# Patient Record
Sex: FEMALE | Race: WHITE | NOT HISPANIC OR LATINO | Employment: OTHER | URBAN - METROPOLITAN AREA
[De-identification: names, ages, dates, MRNs, and addresses within clinical notes are randomized per-mention and may not be internally consistent; named-entity substitution may affect disease eponyms.]

---

## 2017-02-27 ENCOUNTER — HOSPITAL ENCOUNTER (OUTPATIENT)
Dept: MAMMOGRAPHY | Facility: CLINIC | Age: 57
Discharge: HOME | End: 2017-02-27
Attending: FAMILY MEDICINE | Admitting: FAMILY MEDICINE
Payer: COMMERCIAL

## 2017-02-27 DIAGNOSIS — Z12.31 VISIT FOR SCREENING MAMMOGRAM: ICD-10-CM

## 2017-02-27 PROCEDURE — G0202 SCR MAMMO BI INCL CAD: HCPCS

## 2017-03-11 ENCOUNTER — HOSPITAL ENCOUNTER (EMERGENCY)
Facility: CLINIC | Age: 57
Discharge: HOME | End: 2017-03-11
Attending: EMERGENCY MEDICINE | Admitting: EMERGENCY MEDICINE
Payer: COMMERCIAL

## 2017-03-11 ENCOUNTER — APPOINTMENT (OUTPATIENT)
Dept: GENERAL RADIOLOGY | Facility: CLINIC | Age: 57
End: 2017-03-11
Attending: EMERGENCY MEDICINE
Payer: COMMERCIAL

## 2017-03-11 VITALS
RESPIRATION RATE: 18 BRPM | HEART RATE: 65 BPM | OXYGEN SATURATION: 97 % | HEIGHT: 62 IN | TEMPERATURE: 98.2 F | WEIGHT: 140 LBS | BODY MASS INDEX: 25.76 KG/M2 | DIASTOLIC BLOOD PRESSURE: 78 MMHG | SYSTOLIC BLOOD PRESSURE: 119 MMHG

## 2017-03-11 DIAGNOSIS — S62.101A FRACTURE OF WRIST, RIGHT, CLOSED, INITIAL ENCOUNTER: ICD-10-CM

## 2017-03-11 PROCEDURE — 99284 EMERGENCY DEPT VISIT MOD MDM: CPT | Mod: 25

## 2017-03-11 PROCEDURE — 29125 APPL SHORT ARM SPLINT STATIC: CPT | Performed by: EMERGENCY MEDICINE

## 2017-03-11 PROCEDURE — 99284 EMERGENCY DEPT VISIT MOD MDM: CPT | Mod: 25 | Performed by: EMERGENCY MEDICINE

## 2017-03-11 PROCEDURE — 29125 APPL SHORT ARM SPLINT STATIC: CPT

## 2017-03-11 PROCEDURE — 73110 X-RAY EXAM OF WRIST: CPT | Mod: TC,RT

## 2017-03-11 ASSESSMENT — ENCOUNTER SYMPTOMS: ARTHRALGIAS: 1

## 2017-03-11 NOTE — ED NOTES
Fell in the yard at 1315.cCleaning up branches. Wrist is specially tender and sore around the area R distal radius. NO obvious deformity and mild swelling noted.

## 2017-03-11 NOTE — ED PROVIDER NOTES
History     Chief Complaint   Patient presents with     Hand Injury     The history is provided by the patient.     Mary Carmen Jaime is a 56 year old female who presents to the emergency department with right hand injury. She states that today around 1245 she tripped on tree brush while doing yard work. She placed her right hand down to catch herself from the fall and injured her right wrist. Patient took Motrin prior to arrival.  Pain is dull and moderate.      I have reviewed the Medications, Allergies, Past Medical and Surgical History, and Social History in the Epic system.    Patient Active Problem List   Diagnosis     Rosacea     FAMILY HX CONDITION NEC     CARDIOVASCULAR SCREENING; LDL GOAL LESS THAN 160     Advanced directives, counseling/discussion     Trigger finger of right thumb     Bilateral carpal tunnel syndrome     Left carpal tunnel syndrome     Past Medical History   Diagnosis Date     ASCUS on Pap smear 8/7/2011     10/15/09 pap ASCUS neg HPV. Plan-- repeat screening pap smear in one year (due 10/15/2010) Pap 11/10/10 NIL.  Repeat 3 years.        NO ACTIVE PROBLEMS        Past Surgical History   Procedure Laterality Date     C appendectomy  1996     Colonoscopy  1/10/2011     normal repeat 10 years     Laparoscopic tubal ligation  3/9/2012     Procedure:LAPAROSCOPIC TUBAL LIGATION; Laparoscopic Tubal; Surgeon:RUPAL FREEMAN; Location:[facility] OR     Release carpal tunnel Right 6/24/2016     Procedure: RELEASE CARPAL TUNNEL;  Surgeon: Parmjit Peña DO;  Location: [facility] OR     Release trigger finger Right 6/24/2016     Procedure: RELEASE TRIGGER FINGER;  Surgeon: Parmjit Peña DO;  Location: [facility]     Release carpal tunnel Left 10/21/2016     Procedure: RELEASE CARPAL TUNNEL;  Surgeon: Parmjit Peña DO;  Location: [facility]       Family History   Problem Relation Age of Onset     DIABETES Father      AOD     DIABETES Sister      JOD     CANCER Father      salivary glands      "HEART DISEASE Father      1st MI in 40's     CANCER Paternal Uncle      Prostate     Genitourinary Problems Sister      kidney abnormality, hypertrophy and rupture       Social History   Substance Use Topics     Smoking status: Never Smoker     Smokeless tobacco: Never Used     Alcohol use 0.0 oz/week     0 Standard drinks or equivalent per week      Comment: 0-3 drinks per week        Immunization History   Administered Date(s) Administered     Influenza (IIV3) 11/10/2010, 01/11/2012, 10/07/2013     Influenza Vaccine IM 3yrs+ 4 Valent IIV4 10/31/2016     TD (ADULT, 7+) 02/25/2000     TDAP (ADACEL AGES 11-64) 09/28/2010          Allergies   Allergen Reactions     Cephalosporins Rash     Amoxicillin Rash       Current Outpatient Prescriptions   Medication Sig Dispense Refill     MOTRIN IB PO Take 400 mg by mouth every 4 hours as needed for moderate pain       VITAMIN D, CHOLECALCIFEROL, PO Take 2,000 Units by mouth daily       Flaxseed, Linseed, (FLAX PO) Take 1 capsule by mouth daily        Probiotic Product (ACIDOPHILUS PROBIOTIC BLEND) CAPS 1 OTC tab daily       Black Cohosh (CVS BLACK COHOSH) 540 MG CAPS 1 tablet daily, uncertain strength       B Complex Vitamins (B COMPLEX PO) Take  by mouth. 1 daily       Digestive Enzymes (MULTI-ENZYME PO) Take 1 capsule by mouth daily        Cinnamon 500 MG CAPS Take  by mouth. 1 TABLET ONCE A DAY       Green Tea, Camillia sinensis, (GREEN TEA PO) Take  by mouth. 1 TABLET ONCE A DAY       GELATIN 1 ONCE A DAY       CALCIUM & MAGNESIUM CARBONATES PO Take  by mouth. WITH ZINC:1 TABLET ONCE A DAY       MULTIVITAMIN OR 1 tablet daily       Review of Systems   Musculoskeletal: Positive for arthralgias (right wrist).   All other systems reviewed and are negative.      Physical Exam   BP: 123/72  Pulse: 63  Temp: 97.6  F (36.4  C)  Resp: 16  Height: 157.5 cm (5' 2\")  Weight: 63.5 kg (140 lb)  SpO2: 100 %  Physical Exam   Constitutional: She is oriented to person, place, and time. " She appears well-developed and well-nourished.   HENT:   Head: Normocephalic and atraumatic.   Eyes: Conjunctivae and EOM are normal.   Neck: Normal range of motion. Neck supple.   Cardiovascular: Normal rate, regular rhythm, normal heart sounds and intact distal pulses.    Pulmonary/Chest: Effort normal and breath sounds normal. She exhibits no tenderness.   Abdominal: Soft. Bowel sounds are normal. There is no tenderness.   Musculoskeletal:        Cervical back: She exhibits no tenderness.        Thoracic back: She exhibits no tenderness.        Lumbar back: She exhibits no tenderness.   Swelling at right distal radius dorsally. Moderately tender. No other deformity or swelling. CMS intact.   Neurological: She is alert and oriented to person, place, and time.   Skin: Skin is warm and dry.   Psychiatric: She has a normal mood and affect. Her behavior is normal.   Nursing note and vitals reviewed.      ED Course     ED Course     Orthopedic injury tx  Date/Time: 3/11/2017 3:47 PM  Performed by: JOE MCWILLIAMS  Authorized by: JOE MCWILLIAMS   Injury location: wrist  Location details: right wrist  Injury type: fracture  Fracture type: distal radius and triquetral  Pre-procedure neurovascular assessment: neurovascularly intact  Pre-procedure distal perfusion: normal  Pre-procedure neurological function: normal  Pre-procedure range of motion: reduced  Immobilization: splint  Supplies used: Ortho-Glass  Post-procedure neurovascular assessment: post-procedure neurovascularly intact  Post-procedure distal perfusion: normal  Post-procedure neurological function: normal  Post-procedure range of motion: unchanged  Patient tolerance: Patient tolerated the procedure well with no immediate complications                       Results for orders placed or performed during the hospital encounter of 03/11/17 (from the past 24 hour(s))   XR Wrist Right G/E 3 Views    Narrative    RIGHT WRIST THREE VIEWS   3/11/2017 2:06  PM    HISTORY: Pain after trauma.    COMPARISON: None.      Impression    IMPRESSION: There is suggestion of slight cortical irregularity of the  radial styloid which may represent a nondisplaced intra-articular  fracture. The lateral view also demonstrates mild fragmentation of the  dorsal aspect of the triquetrum, also representing a nondisplaced  fracture. There is a moderate ulnar minus variance. No other  abnormality is seen.     ITZ LOPEZ MD     Medications - No data to display    Assessments & Plan (with Medical Decision Making)  56-year-old female with fall and what appears to be a distal right radial fracture as described above.  I personally reviewed the images as well.  She is tender over the area of abnormality seen in the distal radius and in the area over the triquetrum.  She is placed in a short arm splint.  An appointment is established with sports medicine later this week for follow-up.       I have reviewed the nursing notes.    I have reviewed the findings, diagnosis, plan and need for follow up with the patient.    Discharge Medication List as of 3/11/2017  2:43 PM          Final diagnoses:   Fracture of wrist, right, closed, initial encounter   This document serves as a record of services personally performed by Franko Moser MD. It was created on their behalf by Aby Painter, a trained medical scribe. The creation of this record is based on the provider's personal observations and the statements of the patient. This document has been checked and approved by the attending provider.     Note: Chart documentation done in part with Dragon Voice Recognition software. Although reviewed after completion, some word and grammatical errors may remain.    3/11/2017   [facility] EMERGENCY DEPARTMENT     Franko Moser MD  03/11/17 1547

## 2017-03-11 NOTE — LETTER
[facility] EMERGENCY DEPARTMENT  [address] [place] [address]  [phone]    2017    Mary Carmen Jaime  [address] [facility]  [place] [id number] [phone] (home)     : 1960      To Whom it may concern:    Mary Carmen Jaime was seen in our Emergency Department today, 2017.  She will not be able to use her right arm for the next 4 days at least.      Sincerely,        Franko Moser MD

## 2017-03-11 NOTE — ED NOTES
Was cleaning up in yard. Tripped on brush.Held right hand out to catch self. Pain in right wrist. Took motrin 400mg PTA. Ice on now. Strong right radial pulse. Swollen area inner aspect right wrist

## 2017-03-11 NOTE — DISCHARGE INSTRUCTIONS
Wrist Fracture, General  You have a broken bone (fracture) in your wrist. This may be a small crack or chip in the bone. Or it may be a major break, with the broken parts pushed out of position. Wrist fractures are treated with a splint or cast. They take about 4 to 6 weeks to heal. Severe injuries may need surgery.    Home care  Follow these guidelines when caring for yourself at home:    Keep your arm elevated to reduce pain and swelling. When sitting or lying down keep your arm above the level of your heart. You can do this by placing your arm on a pillow that rests on your chest or on a pillow at your side. This is most important during the first 2 days (48 hours) after the injury.    Put an ice pack on the injured area. Do this for 20 minutes every 1 to 2 hours the first day for pain relief. You can make an ice pack by wrapping a plastic bag of ice cubes in a thin towel. As the ice melts, be careful that the cast or splint doesn t get wet. Continue using the ice pack 3 to 4 times a day for the next 2 days. Then use the ice pack as needed to ease pain and swelling.    Keep the cast or splint completely dry at all times. Bathe with your cast or splint out of the water. Protect it with a large plastic bag, rubber-banded at the top end. If a fiberglass cast or splint gets wet, you can dry it with a hair dryer.    You may use acetaminophen or ibuprofen to control pain, unless another pain medicine was prescribed. If you have chronic liver or kidney disease, talk with your health care provider before using these medicines. Also talk with your provider if you ve had a stomach ulcer or GI bleeding.    Don t put creams or objects under the cast if you have itching.  Follow-up care  Follow up with your health care provider in 1 week, or as advised. This is to make sure the bone is healing the way it should. If a splint was put on, it will be changed to a cast during your follow-up visit. A cast may need to be changed  at 2 to 3 weeks, as the swelling goes down.  If X-rays were taken, a radiologist will look at them. You will be told of any new findings that may affect your care.  When to seek medical advice  Call your health care provider right away if any of these occur:    The plaster cast or splint becomes wet or soft    The cast cracks    Bad odor from the cast or wound fluid stains the cast    The fiberglass cast or splint stays wet for more than 24 hours    Tightness or pain under the cast or splint gets worse    Fingers become swollen, cold, blue, numb, or tingly    You can t move your fingers    Skin around cast becomes red    6658-1453 [employer]. [address] [place] [address], [place] [address]. All rights reserved. This information is not intended as a substitute for professional medical care. Always follow your healthcare professional's instructions.

## 2017-03-11 NOTE — ED AVS SNAPSHOT
[facility] Emergency Department    [address] [place] [address]    Phone:  [phone]    Fax:  [phone]                                       Mary Carmen Jaime   MRN: [id number]    Department:  [facility] Emergency Department   Date of Visit:  3/11/2017           Patient Information     Date Of Birth          1960        Your diagnoses for this visit were:     Fracture of wrist, right, closed, initial encounter        You were seen by Franko Moser MD.      Follow-up Information     Follow up with Radha Esparza MD.    Specialty:  Family Medicine - Sports Medicine    Contact information:    [facility]  [address]  [facility] [address]  [phone]          Discharge Instructions         Wrist Fracture, General  You have a broken bone (fracture) in your wrist. This may be a small crack or chip in the bone. Or it may be a major break, with the broken parts pushed out of position. Wrist fractures are treated with a splint or cast. They take about 4 to 6 weeks to heal. Severe injuries may need surgery.    Home care  Follow these guidelines when caring for yourself at home:    Keep your arm elevated to reduce pain and swelling. When sitting or lying down keep your arm above the level of your heart. You can do this by placing your arm on a pillow that rests on your chest or on a pillow at your side. This is most important during the first 2 days (48 hours) after the injury.    Put an ice pack on the injured area. Do this for 20 minutes every 1 to 2 hours the first day for pain relief. You can make an ice pack by wrapping a plastic bag of ice cubes in a thin towel. As the ice melts, be careful that the cast or splint doesn t get wet. Continue using the ice pack 3 to 4 times a day for the next 2 days. Then use the ice pack as needed to ease pain and swelling.    Keep the cast or splint completely dry at all times. Bathe with your cast or  splint out of the water. Protect it with a large plastic bag, rubber-banded at the top end. If a fiberglass cast or splint gets wet, you can dry it with a hair dryer.    You may use acetaminophen or ibuprofen to control pain, unless another pain medicine was prescribed. If you have chronic liver or kidney disease, talk with your health care provider before using these medicines. Also talk with your provider if you ve had a stomach ulcer or GI bleeding.    Don t put creams or objects under the cast if you have itching.  Follow-up care  Follow up with your health care provider in 1 week, or as advised. This is to make sure the bone is healing the way it should. If a splint was put on, it will be changed to a cast during your follow-up visit. A cast may need to be changed at 2 to 3 weeks, as the swelling goes down.  If X-rays were taken, a radiologist will look at them. You will be told of any new findings that may affect your care.  When to seek medical advice  Call your health care provider right away if any of these occur:    The plaster cast or splint becomes wet or soft    The cast cracks    Bad odor from the cast or wound fluid stains the cast    The fiberglass cast or splint stays wet for more than 24 hours    Tightness or pain under the cast or splint gets worse    Fingers become swollen, cold, blue, numb, or tingly    You can t move your fingers    Skin around cast becomes red    6303-9271 [employer]. [address] [place] [address]. All rights reserved. This information is not intended as a substitute for professional medical care. Always follow your healthcare professional's instructions.          Future Appointments        Provider Department Dept Phone Center    3/14/2017 3:40 PM Radha Esparza MD [facility] [phone] [facility]    4/5/2017 2:00 PM Huong Greenwood MD [facility] [phone] [facility]      24 Hour  Appointment Hotline       To make an appointment at any [facility] clinic, call [phone] ([phone]). If you don't have a family doctor or clinic, we will help you find one. [facility] clinics are conveniently located to serve the needs of you and your family.             Review of your medicines      Our records show that you are taking the medicines listed below. If these are incorrect, please call your family doctor or clinic.        Dose / Directions Last dose taken    ACIDOPHILUS PROBIOTIC BLEND Caps        1 OTC tab daily   Refills:  0        B COMPLEX PO        Take  by mouth. 1 daily   Refills:  0        Black Cohosh 540 MG Caps   Commonly known as:  [employer]S BLACK COHOSH        1 tablet daily, uncertain strength   Refills:  0        CALCIUM & MAGNESIUM CARBONATES PO        Take  by mouth. WITH ZINC:1 TABLET ONCE A DAY   Refills:  0        cinnamon 500 MG Caps        Take  by mouth. 1 TABLET ONCE A DAY   Refills:  0        FLAX PO   Dose:  1 capsule        Take 1 capsule by mouth daily   Refills:  0        GELATIN        1 ONCE A DAY   Refills:  0        GREEN TEA PO        Take  by mouth. 1 TABLET ONCE A DAY   Refills:  0        MOTRIN IB PO   Dose:  400 mg        Take 400 mg by mouth every 4 hours as needed for moderate pain   Refills:  0        MULTI-ENZYME PO   Dose:  1 capsule        Take 1 capsule by mouth daily   Refills:  0        MULTIVITAMIN PO        1 tablet daily   Refills:  0        VITAMIN D (CHOLECALCIFEROL) PO   Dose:  2000 Units        Take 2,000 Units by mouth daily   Refills:  0                Procedures and tests performed during your visit     XR Wrist Right G/E 3 Views      Orders Needing Specimen Collection     None      Pending Results     No orders found from 3/9/2017 to 3/12/2017.            Pending Culture Results     No orders found from 3/9/2017 to 3/12/2017.            Thank you for choosing [facility]       Thank you for choosing [facility] for your care. Our goal is always to  "provide you with excellent care. Hearing back from our patients is one way we can continue to improve our services. Please take a few minutes to complete the written survey that you may receive in the mail after you visit with us. Thank you!        PictureMenu Information     PictureMenu lets you send messages to your doctor, view your test results, renew your prescriptions, schedule appointments and more. To sign up, go to [email or web address] . Click on \"Log in\" on the left side of the screen, which will take you to the Welcome page. Then click on \"Sign up Now\" on the right side of the page.     You will be asked to enter the access code listed below, as well as some personal information. Please follow the directions to create your username and password.     Your access code is: [email or web address]  Expires: 2017  2:43 PM     Your access code will  in 90 days. If you need help or a new code, please call your [facility] clinic or [phone].        Care EveryWhere ID     This is your Care EveryWhere ID. This could be used by other organizations to access your [facility] medical records  [id number]        After Visit Summary       This is your record. Keep this with you and show to your community pharmacist(s) and doctor(s) at your next visit.                  "

## 2017-03-14 ENCOUNTER — OFFICE VISIT (OUTPATIENT)
Dept: ORTHOPEDICS | Facility: CLINIC | Age: 57
End: 2017-03-14
Payer: COMMERCIAL

## 2017-03-14 VITALS — BODY MASS INDEX: 27.79 KG/M2 | WEIGHT: 151 LBS | HEART RATE: 84 BPM | HEIGHT: 62 IN

## 2017-03-14 DIAGNOSIS — S52.514A CLOSED NONDISPLACED FRACTURE OF STYLOID PROCESS OF RIGHT RADIUS, INITIAL ENCOUNTER: Primary | ICD-10-CM

## 2017-03-14 PROCEDURE — 99204 OFFICE O/P NEW MOD 45 MIN: CPT | Performed by: PHYSICAL MEDICINE & REHABILITATION

## 2017-03-14 NOTE — PATIENT INSTRUCTIONS
Today's Plan of Care:  -EXOS care provided  -Elevate hand above heart for swellling  -Over the counter medication: Patient's preferred OTC medication as directed on packaging.    Follow Up: 1 week or sooner if symptoms fail to improve or worsen. Call with any questions or concerns.

## 2017-03-14 NOTE — MR AVS SNAPSHOT
"              After Visit Summary   3/14/2017    Mary Carmen Jaime    MRN: [id number]           Patient Information     Date Of Birth          1960        Visit Information        Provider Department      3/14/2017 3:40 PM Radha Esparza MD [facility]        Care Instructions    Today's Plan of Care:  -EXOS care provided  -Elevate hand above heart for swellling  -Over the counter medication: Patient's preferred OTC medication as directed on packaging.    Follow Up: 1 week or sooner if symptoms fail to improve or worsen. Call with any questions or concerns.             Follow-ups after your visit        Your next 10 appointments already scheduled     Apr 05, 2017  2:00 PM CDT   PHYSICAL with Huong Greenwood MD   [facility] ([facility])    [address] [place] [address] [phone]              Who to contact     If you have questions or need follow up information about today's clinic visit or your schedule please contact [facility] directly at [phone].  Normal or non-critical lab and imaging results will be communicated to you by [employer], letter or phone within 4 business days after the clinic has received the results. If you do not hear from us within 7 days, please contact the clinic through MiF[employer]art or phone. If you have a critical or abnormal lab result, we will notify you by phone as soon as possible.  Submit refill requests through NullPointer or call your pharmacy and they will forward the refill request to us. Please allow 3 business days for your refill to be completed.          Additional Information About Your Visit        MiFihart Information     NullPointer lets you send messages to your doctor, view your test results, renew your prescriptions, schedule appointments and more. To sign up, go to [email or web address] . Click on \"Log in\" on the left side of the screen, which will take you to the Welcome " "page. Then click on \"Sign up Now\" on the right side of the page.     You will be asked to enter the access code listed below, as well as some personal information. Please follow the directions to create your username and password.     Your access code is: [email or web address]  Expires: 2017  3:43 PM     Your access code will  in 90 days. If you need help or a new code, please call your [facility] or [phone].        Care EveryWhere ID     This is your Care EveryWhere ID. This could be used by other organizations to access your [facility] medical records  [id number]        Your Vitals Were     Pulse Height Last Period BMI (Body Mass Index)          84 5' 2\" (1.575 m) 2013 27.62 kg/m2         Blood Pressure from Last 3 Encounters:   17 119/78   10/21/16 105/65   16 112/78    Weight from Last 3 Encounters:   17 151 lb (68.5 kg)   17 140 lb (63.5 kg)   10/31/16 146 lb (66.2 kg)              Today, you had the following     No orders found for display       Primary Care Provider Office Phone # Fax #    Huong Aleah Greenwood [phone]       [facility] [address] [place] [address]        Thank you!     Thank you for choosing [facility]  for your care. Our goal is always to provide you with excellent care. Hearing back from our patients is one way we can continue to improve our services. Please take a few minutes to complete the written survey that you may receive in the mail after your visit with us. Thank you!             Your Updated Medication List - Protect others around you: Learn how to safely use, store and throw away your medicines at www.disposemymeds.org.          This list is accurate as of: 3/14/17  4:12 PM.  Always use your most recent med list.                   Brand Name Dispense Instructions for use    ACIDOPHILUS PROBIOTIC BLEND Caps      1 OTC tab daily       B COMPLEX PO      Take  by mouth. 1 daily       " Black Cohosh 540 MG Caps    CVS BLACK COHOSH     1 tablet daily, uncertain strength       CALCIUM & MAGNESIUM CARBONATES PO      Take  by mouth. WITH ZINC:1 TABLET ONCE A DAY       cinnamon 500 MG Caps      Take  by mouth. 1 TABLET ONCE A DAY       FLAX PO      Take 1 capsule by mouth daily       GELATIN      1 ONCE A DAY       GREEN TEA PO      Take  by mouth. 1 TABLET ONCE A DAY       MOTRIN IB PO      Take 400 mg by mouth every 4 hours as needed for moderate pain       MULTI-ENZYME PO      Take 1 capsule by mouth daily       MULTIVITAMIN PO      1 tablet daily       VITAMIN D (CHOLECALCIFEROL) PO      Take 2,000 Units by mouth daily

## 2017-03-14 NOTE — PROGRESS NOTES
Sports Medicine Clinic Visit    PCP: Huong Greenwood    CC: Patient presents with:  Wrist right      HPI:  Mary Carmen Jaime is a 56 year old female who is seen as an ER referral. She notes an injury on 3/11 when she tripped on a tree brush while doing hard work and [employer]. Pain is located on the right wrist and base of the thumb and dorsal side of the hand.  She rates the pain at a 8/10 at its worst and a 3/10 currently.  Symptoms are relieved with immobilization and worsened by wrist and thumb movements. She endorses swelling and bruising and denies popping, grinding, catching, locking, instability, numbness, tingling, weakness, pain in other joints and fever, chills.  Other treatment has included Tylenol.     Review of Systems:  Musculoskeletal: as above  Remainder of review of systems is negative including constitutional, eyes, ENT, CV, pulmonary, GI, , endocrine, skin, hematologic, and neurologic except as noted in HPI or medical history.    History reviewed. No pertinent past surgical/medical/family/social history.    Past Medical History   Diagnosis Date     ASCUS on Pap smear 8/7/2011     10/15/09 pap ASCUS neg HPV. Plan-- repeat screening pap smear in one year (due 10/15/2010) Pap 11/10/10 NIL.  Repeat 3 years.        NO ACTIVE PROBLEMS      Past Surgical History   Procedure Laterality Date     C appendectomy  1996     Colonoscopy  1/10/2011     normal repeat 10 years     Laparoscopic tubal ligation  3/9/2012     Procedure:LAPAROSCOPIC TUBAL LIGATION; Laparoscopic Tubal; Surgeon:RUPAL FREEMAN; [facility]     Release carpal tunnel Right 6/24/2016     Procedure: RELEASE CARPAL TUNNEL;  Surgeon: Parmjit Peña DO;  [facility]     Release trigger finger Right 6/24/2016     Procedure: RELEASE TRIGGER FINGER;  Surgeon: Parmjit Peña DO;  [facility]     Release carpal tunnel Left 10/21/2016     Procedure: RELEASE CARPAL TUNNEL;  Surgeon: Parmjit Peña  "DO;  [facility]     Family History   Problem Relation Age of Onset     DIABETES Father      AOD     DIABETES Sister      JOD     CANCER Father      salivary glands     HEART DISEASE Father      1st MI in 40's     CANCER Paternal Uncle      Prostate     Genitourinary Problems Sister      kidney abnormality, hypertrophy and rupture     Social History     Social History     Marital status:      Spouse name: Denzel     Number of children: 2     Years of education: N/A     Occupational History      [employer]     Social History Main Topics     Smoking status: Never Smoker     Smokeless tobacco: Never Used     Alcohol use 0.0 oz/week     0 Standard drinks or equivalent per week      Comment: 0-3 drinks per week     Drug use: No     Sexual activity: Yes     Partners: Male     Birth control/ protection: Surgical      Comment: tubal     Other Topics Concern     Not on file     Social History Narrative       She works at the [employer] as a   At baseline, she does not need assistance with ambulation      Current Outpatient Prescriptions   Medication     MOTRIN IB PO     VITAMIN D, CHOLECALCIFEROL, PO     Flaxseed, Linseed, (FLAX PO)     Probiotic Product (ACIDOPHILUS PROBIOTIC BLEND) CAPS     Black Cohosh (CVS BLACK COHOSH) 540 MG CAPS     B Complex Vitamins (B COMPLEX PO)     Digestive Enzymes (MULTI-ENZYME PO)     Cinnamon 500 MG CAPS     Green Tea, Camillia sinensis, (GREEN TEA PO)     GELATIN     MULTIVITAMIN OR     CALCIUM & MAGNESIUM CARBONATES PO     No current facility-administered medications for this visit.      Allergies   Allergen Reactions     Cephalosporins Rash     Amoxicillin Rash     Penicillins Rash         Objective:  Pulse 84  Ht 5' 2\" (1.575 m)  Wt 151 lb (68.5 kg)  LMP 11/01/2013  BMI 27.62 kg/m2    General: healthy, alert and in no distress    Head: Normocephalic, atraumatic  Eyes: no scleral icterus or conjunctival erythema   Oropharynx:  Mucous membranes moist  Skin: no " erythema, ecchymosis, petechiae, or jaundice  CV: regular rhythm by palpation, 2+ distal pulses, no pedal edema    Resp: normal respiratory effort without conversational dyspnea   Psych: normal mood and affect    Gait: Non-antalgic, appropriate coordination and balance   Neuro: normal light touch sensory exam of the extremities. Motor strength as noted below    Musculoskeletal:    Bilateral Wrist and Hand exam    Inspection:  Right hand/finger swelling    Palpation:  Tender to palpation over the right dorsal wrist.      ROM:  Right forearm supination and wrist flexion/extension are limited actively (as well as passively due to patient resisting movement due to pain).    Strength:  Bilateral elbow flexion/extension 5/5.  Left forearm supination/pronation, wrist flexion/extension,  strength, finger abduction 5/5.  Right Left forearm supination/pronation, wrist flexion/extension,  strength, finger abduction 4+/5.      Neurovascular:       2+ radial pulses bilaterally with brisk capillary refill and normal sensation to light touch in the radial, median and ulnar nerve distributions    Radiology:  Independent visualization of images performed.    Recent Results (from the past 744 hour(s))   MA Screening Digital Bilateral    Narrative    MA SCREENING DIGITAL BILATERAL  With CAD 2/27/2017 2:28 PM    BREAST SYMPTOMS: No current breast complaints.     Family history of breast cancer in her grandmother. Personal history  of benign left breast needle biopsy in 2011 with clip placement.    COMPARISON:  2/25/2016, 2/23/2015, 2/20/2014, 2/11/2013.    BREAST DENSITY: Scattered fibroglandular densities    COMMENTS: No findings of suspicion for malignancy. Biopsy clip is  again seen in the posterior inner upper left breast. The area of  asymmetry in this region has decreased in prominence since the prior  study.      Impression    IMPRESSION: BI-RADS CATEGORY: 2 - Benign Finding(s).    RECOMMENDED FOLLOW-UP: Annual  Mammography    Exam results letter mailed to patient.    PÉREZ CORRALES MD   XR Wrist Right G/E 3 Views    Narrative    RIGHT WRIST THREE VIEWS   3/11/2017 2:06 PM    HISTORY: Pain after trauma.    COMPARISON: None.      Impression    IMPRESSION: There is suggestion of slight cortical irregularity of the  radial styloid which may represent a nondisplaced intra-articular  fracture. The lateral view also demonstrates mild fragmentation of the  dorsal aspect of the triquetrum, also representing a nondisplaced  fracture. There is a moderate ulnar minus variance. No other  abnormality is seen.     ITZ LOPEZ MD     Assessment:  1. Closed nondisplaced fracture of styloid process of right radius, initial encounter        Plan:  Discussed the assessment with the patient. We discussed the following treatment options: symptom treatment, activity modification/rest, imaging and rehab. Following discussion, plan:  -Placed in a short arm Exos splint.  -Elevate hand above heart to reduce swelling when able.  -Ibuprofen and/or Tylenol as needed for pain relief.  Please take ibuprofen with food.    -Follow up in 1 week for repeat x-rays and re-evaluation.      Mercedes Esparza MD, CAQ  [facility]

## 2017-03-15 ENCOUNTER — TELEPHONE (OUTPATIENT)
Dept: ORTHOPEDICS | Facility: CLINIC | Age: 57
End: 2017-03-15

## 2017-03-15 NOTE — TELEPHONE ENCOUNTER
LVM for patient to return call to reschedule appointment for 3/21 for earlier or Monday the day previous.    Ofelia Pate M.Ed., ATC

## 2017-03-20 ENCOUNTER — OFFICE VISIT (OUTPATIENT)
Dept: ORTHOPEDICS | Facility: CLINIC | Age: 57
End: 2017-03-20
Payer: COMMERCIAL

## 2017-03-20 ENCOUNTER — RADIANT APPOINTMENT (OUTPATIENT)
Dept: GENERAL RADIOLOGY | Facility: CLINIC | Age: 57
End: 2017-03-20
Attending: PHYSICAL MEDICINE & REHABILITATION
Payer: COMMERCIAL

## 2017-03-20 VITALS — HEIGHT: 62 IN | BODY MASS INDEX: 27.79 KG/M2 | WEIGHT: 151 LBS | HEART RATE: 66 BPM

## 2017-03-20 DIAGNOSIS — S52.514D CLOSED NONDISPLACED FRACTURE OF STYLOID PROCESS OF RIGHT RADIUS WITH ROUTINE HEALING, SUBSEQUENT ENCOUNTER: Primary | ICD-10-CM

## 2017-03-20 DIAGNOSIS — S52.514A: ICD-10-CM

## 2017-03-20 PROCEDURE — 73110 X-RAY EXAM OF WRIST: CPT | Mod: TC

## 2017-03-20 PROCEDURE — 99213 OFFICE O/P EST LOW 20 MIN: CPT | Performed by: PHYSICAL MEDICINE & REHABILITATION

## 2017-03-20 NOTE — PROGRESS NOTES
Sports Medicine Clinic Visit - Interim History March 20, 2017    Initial Visit Date 3/14/2017  Initial Injury Date 3/11/17    PCP: Huong Greenwood NEHEMIAS Jaime is a 56 year old female who is seen in f/u up for right wrist fracture. Since last visit on 3/15/2017 patient has been doing well. Her EXOS brace has been fitting well.  She rates the pain at a 4/10 currently.  Symptoms are relieved with immobilizatoin and worsened by movements of the wrist occasionally.     - Now ~ 1 week from initial injury      Review of Systems  Musculoskeletal: as above  Remainder of review of systems is negative including constitutional, eyes, ENT, CV, pulmonary, GI, , endocrine, skin, hematologic, and neurologic except as noted in HPI or medical history.    History reviewed. No pertinent past surgical/medical/family/social history.    Past Medical History   Diagnosis Date     ASCUS on Pap smear 8/7/2011     10/15/09 pap ASCUS neg HPV. Plan-- repeat screening pap smear in one year (due 10/15/2010) Pap 11/10/10 NIL.  Repeat 3 years.        NO ACTIVE PROBLEMS      Past Surgical History   Procedure Laterality Date     C appendectomy  1996     Colonoscopy  1/10/2011     normal repeat 10 years     Laparoscopic tubal ligation  3/9/2012     Procedure:LAPAROSCOPIC TUBAL LIGATION; Laparoscopic Tubal; Surgeon:RUPAL FREEMAN; [facility]     Release carpal tunnel Right 6/24/2016     Procedure: RELEASE CARPAL TUNNEL;  Surgeon: Parmjit Peña DO;  [facility]     Release trigger finger Right 6/24/2016     Procedure: RELEASE TRIGGER FINGER;  Surgeon: Parmjit Peña DO;  [facility]     Release carpal tunnel Left 10/21/2016     Procedure: RELEASE CARPAL TUNNEL;  Surgeon: Parmjit Peña DO;  [facility]     Family History   Problem Relation Age of Onset     DIABETES Father      AOD     DIABETES Sister      JOD     CANCER Father      salivary glands     HEART DISEASE Father      1st MI  "in 40's     CANCER Paternal Uncle      Prostate     Genitourinary Problems Sister      kidney abnormality, hypertrophy and rupture     Social History     Social History     Marital status:      Spouse name: Denzel     Number of children: 2     Years of education: N/A     Occupational History      [employer]     Social History Main Topics     Smoking status: Never Smoker     Smokeless tobacco: Never Used     Alcohol use 0.0 oz/week     0 Standard drinks or equivalent per week      Comment: 0-3 drinks per week     Drug use: No     Sexual activity: Yes     Partners: Male     Birth control/ protection: Surgical      Comment: tubal     Other Topics Concern     Not on file     Social History Narrative       She works at the [employer] as a   At baseline, she does not need assistance with ambulation    Current Outpatient Prescriptions   Medication     order for DME     MOTRIN IB PO     VITAMIN D, CHOLECALCIFEROL, PO     Flaxseed, Linseed, (FLAX PO)     Probiotic Product (ACIDOPHILUS PROBIOTIC BLEND) CAPS     Black Cohosh (CVS BLACK COHOSH) 540 MG CAPS     B Complex Vitamins (B COMPLEX PO)     Digestive Enzymes (MULTI-ENZYME PO)     Cinnamon 500 MG CAPS     Green Tea, Camillia sinensis, (GREEN TEA PO)     GELATIN     CALCIUM & MAGNESIUM CARBONATES PO     MULTIVITAMIN OR     No current facility-administered medications for this visit.      Allergies   Allergen Reactions     Cephalosporins Rash     Amoxicillin Rash     Penicillins Rash         Objective:  Pulse 66  Ht 5' 2\" (1.575 m)  Wt 151 lb (68.5 kg)  LMP 11/01/2013  BMI 27.62 kg/m2    General: healthy, alert and in no distress    Head: Normocephalic, atraumatic  Eyes: no scleral icterus or conjunctival erythema   Oropharynx:  Mucous membranes moist  Skin: no erythema, ecchymosis, petechiae, or jaundice  CV: regular rhythm by palpation, 2+ distal pulses, no pedal edema    Resp: normal respiratory effort without conversational dyspnea   Psych: " normal mood and affect    Gait: Non-antalgic, appropriate coordination and balance   Neuro: normal light touch sensory exam of the extremities. Motor strength as noted below    Musculoskeletal:    Bilateral Wrist and Hand exam    Inspection:  Mild bruising in the right volar wrist     Palpation:  Tender to palpation over the right thenar eminence and right volar wrist    ROM:  Right forearm supination and wrist flexion/extension are limited    Strength:  Bilateral elbow flexion/extension 5/5. Left forearm supination/pronation, wrist flexion/extension,  strength, finger abduction 5/5. Right forearm supination/pronation, wrist flexion/extension,  strength, finger abduction 4+/5.    Neurovascular:       2+ radial pulses bilaterally with brisk capillary refill and normal sensation to light touch in the radial, median and ulnar nerve distributions    Radiology:  Independent visualization of images performed  Recent Results (from the past 744 hour(s))   MA Screening Digital Bilateral    Narrative    MA SCREENING DIGITAL BILATERAL  With CAD 2/27/2017 2:28 PM    BREAST SYMPTOMS: No current breast complaints.     Family history of breast cancer in her grandmother. Personal history  of benign left breast needle biopsy in 2011 with clip placement.    COMPARISON:  2/25/2016, 2/23/2015, 2/20/2014, 2/11/2013.    BREAST DENSITY: Scattered fibroglandular densities    COMMENTS: No findings of suspicion for malignancy. Biopsy clip is  again seen in the posterior inner upper left breast. The area of  asymmetry in this region has decreased in prominence since the prior  study.      Impression    IMPRESSION: BI-RADS CATEGORY: 2 - Benign Finding(s).    RECOMMENDED FOLLOW-UP: Annual Mammography    Exam results letter mailed to patient.    PÉREZ CORRALES MD   XR Wrist Right G/E 3 Views    Narrative    RIGHT WRIST THREE VIEWS   3/11/2017 2:06 PM    HISTORY: Pain after trauma.    COMPARISON: None.      Impression    IMPRESSION: There  is suggestion of slight cortical irregularity of the  radial styloid which may represent a nondisplaced intra-articular  fracture. The lateral view also demonstrates mild fragmentation of the  dorsal aspect of the triquetrum, also representing a nondisplaced  fracture. There is a moderate ulnar minus variance. No other  abnormality is seen.     ITZ LOPEZ MD       Assessment:  1. Closed nondisplaced fracture of styloid process of right radius with routine healing, subsequent encounter        Plan:  Discussed the assessment with the patient. We discussed the following treatment options: symptom treatment, activity modification/rest, imaging and rehab. Following discussion, plan:  -Continue EXOS brace  -Elevate hand above heart for swellling  -Over the counter medication: Patient's preferred OTC medication as directed on packaging.   -Follow Up: 3 weeks for repeat x-rays and evaluation. Call with any questions or concerns.     Mercedes Esparza MD, CAQ  [facility]

## 2017-03-20 NOTE — PATIENT INSTRUCTIONS
Today's Plan of Care:  -Continue EXOS brace  -Elevate hand above heart for swellling  -Over the counter medication: Patient's preferred OTC medication as directed on packaging.     Follow Up: 3 weeks for repeat x-rays and evaluation. Call with any questions or concerns.

## 2017-03-20 NOTE — MR AVS SNAPSHOT
After Visit Summary   3/20/2017    Mary Carmen Jaime    MRN: [id number]           Patient Information     Date Of Birth          1960        Visit Information        Provider Department      3/20/2017 12:40 PM Radha Esparza MD [facility]        Today's Diagnoses     Closed nondisplaced fracture of styloid process of right radius    -  1      Care Instructions    Today's Plan of Care:  -Continue EXOS brace  -Elevate hand above heart for swellling  -Over the counter medication: Patient's preferred OTC medication as directed on packaging.     Follow Up: 3 weeks for repeat x-rays and evaluation. Call with any questions or concerns.               Follow-ups after your visit        Your next 10 appointments already scheduled     Apr 05, 2017  2:00 PM CDT   PHYSICAL with Huong Greenwood MD   [facility]    [address] [place] [id number] [phone]              Who to contact     If you have questions or need follow up information about today's clinic visit or your schedule please contact [facility] directly at [phone].  Normal or non-critical lab and imaging results will be communicated to you by Konokopiahart, letter or phone within 4 business days after the clinic has received the results. If you do not hear from us within 7 days, please contact the clinic through [employer]op[employer]art or phone. If you have a critical or abnormal lab result, we will notify you by phone as soon as possible.  Submit refill requests through [employer] or call your pharmacy and they will forward the refill request to us. Please allow 3 business days for your refill to be completed.          Additional Information About Your Visit        MyChart Information     [employer] lets you send messages to your doctor, view your test results, renew your prescriptions, schedule appointments and more. To sign up, go to  [email or web address] . Click on \"Log in\" on the left side of the screen, which will take you to the Welcome page. Then click on \"Sign up Now\" on the right side of the page.     You will be asked to enter the access code listed below, as well as some personal information. Please follow the directions to create your username and password.     Your access code is: [email or web address]  Expires: 2017  3:43 PM     Your access code will  in 90 days. If you need help or a new code, please call your [place] clinic or [phone].        Care EveryWhere ID     This is your Care EveryWhere ID. This could be used by other organizations to access your [place] medical records  [id number]        Your Vitals Were     Pulse Height Last Period BMI (Body Mass Index)          66 5' 2\" (1.575 m) 2013 27.62 kg/m2         Blood Pressure from Last 3 Encounters:   17 119/78   10/21/16 105/65   16 112/78    Weight from Last 3 Encounters:   17 151 lb (68.5 kg)   17 151 lb (68.5 kg)   17 140 lb (63.5 kg)               Primary Care Provider Office Phone # Fax #    Huong Aleah Grenewood [phone]       [facility] [address] [place] [address]        Thank you!     Thank you for choosing [facility]  for your care. Our goal is always to provide you with excellent care. Hearing back from our patients is one way we can continue to improve our services. Please take a few minutes to complete the written survey that you may receive in the mail after your visit with us. Thank you!             Your Updated Medication List - Protect others around you: Learn how to safely use, store and throw away your medicines at www.disposemymeds.org.          This list is accurate as of: 3/20/17  1:22 PM.  Always use your most recent med list.                   Brand Name Dispense Instructions for use    ACIDOPHILUS PROBIOTIC BLEND Caps      1 OTC tab daily       B " COMPLEX PO      Take  by mouth. 1 daily       Black Cohosh 540 MG Caps    CVS BLACK COHOSH     1 tablet daily, uncertain strength       CALCIUM & MAGNESIUM CARBONATES PO      Take  by mouth. WITH ZINC:1 TABLET ONCE A DAY       cinnamon 500 MG Caps      Take  by mouth. 1 TABLET ONCE A DAY       FLAX PO      Take 1 capsule by mouth daily       GELATIN      1 ONCE A DAY       GREEN TEA PO      Take  by mouth. 1 TABLET ONCE A DAY       MOTRIN IB PO      Take 400 mg by mouth every 4 hours as needed for moderate pain       MULTI-ENZYME PO      Take 1 capsule by mouth daily       MULTIVITAMIN PO      1 tablet daily       order for DME     1 Device    Equipment being ordered: EXOS, XS, short arm       VITAMIN D (CHOLECALCIFEROL) PO      Take 2,000 Units by mouth daily

## 2017-04-05 ENCOUNTER — OFFICE VISIT (OUTPATIENT)
Dept: FAMILY MEDICINE | Facility: CLINIC | Age: 57
End: 2017-04-05
Payer: COMMERCIAL

## 2017-04-05 VITALS
HEIGHT: 62 IN | WEIGHT: 147 LBS | HEART RATE: 78 BPM | RESPIRATION RATE: 18 BRPM | BODY MASS INDEX: 27.05 KG/M2 | TEMPERATURE: 98 F | DIASTOLIC BLOOD PRESSURE: 64 MMHG | SYSTOLIC BLOOD PRESSURE: 108 MMHG

## 2017-04-05 DIAGNOSIS — Z12.4 SCREENING FOR MALIGNANT NEOPLASM OF CERVIX: ICD-10-CM

## 2017-04-05 DIAGNOSIS — M79.645 PAIN OF LEFT THUMB: ICD-10-CM

## 2017-04-05 DIAGNOSIS — Z13.820 SCREENING FOR OSTEOPOROSIS: ICD-10-CM

## 2017-04-05 DIAGNOSIS — Z00.00 ENCOUNTER FOR ROUTINE ADULT HEALTH EXAMINATION WITHOUT ABNORMAL FINDINGS: Primary | ICD-10-CM

## 2017-04-05 PROCEDURE — 87624 HPV HI-RISK TYP POOLED RSLT: CPT | Performed by: FAMILY MEDICINE

## 2017-04-05 PROCEDURE — G0145 SCR C/V CYTO,THINLAYER,RESCR: HCPCS | Performed by: FAMILY MEDICINE

## 2017-04-05 PROCEDURE — 99396 PREV VISIT EST AGE 40-64: CPT | Performed by: FAMILY MEDICINE

## 2017-04-05 ASSESSMENT — PAIN SCALES - GENERAL: PAINLEVEL: NO PAIN (0)

## 2017-04-05 NOTE — MR AVS SNAPSHOT
After Visit Summary   4/5/2017    Mary Carmen Jaime    MRN: [id number]           Patient Information     Date Of Birth          1960        Visit Information        Provider Department      4/5/2017 2:00 PM Huong Greenwood MD [facility]        Today's Diagnoses     Encounter for routine adult health examination without abnormal findings    -  1    Pain of left thumb        Screening for osteoporosis        Screening for malignant neoplasm of cervix           Follow-ups after your visit        Your next 10 appointments already scheduled     Apr 11, 2017  2:20 PM CDT   [facility] Visit with Radha [facility]za [facility]   [facility]    [address] [place] [id number]   [phone]              Future tests that were ordered for you today     Open Future Orders        Priority Expected Expires Ordered    DX Hip/Pelvis/Spine Routine  4/5/2018 4/5/2017            Who to contact     If you have questions or need follow up information about today's clinic visit or your schedule please contact [facility] directly at [phone].  Normal or non-critical lab and imaging results will be communicated to you by MyChart, letter or phone within 4 business days after the clinic has received the results. If you do not hear from us within 7 days, please contact the clinic through [employer]Sof[employer] or phone. If you have a critical or abnormal lab result, we will notify you by phone as soon as possible.  Submit refill requests through [employer]Event or call your pharmacy and they will forward the refill request to us. Please allow 3 business days for your refill to be completed.          Additional Information About Your Visit        MyChart Information     eEvent lets you send messages to your doctor, view your test results, renew your prescriptions, schedule appointments and more. To sign up, go to [email or web address] . Click on  "\"Log in\" on the left side of the screen, which will take you to the Welcome page. Then click on \"Sign up Now\" on the right side of the page.     You will be asked to enter the access code listed below, as well as some personal information. Please follow the directions to create your username and password.     Your access code is: [email or web address]  Expires: 2017  3:43 PM     Your access code will  in 90 days. If you need help or a new code, please call your [place] clinic or [phone].        Care EveryWhere ID     This is your Care EveryWhere ID. This could be used by other organizations to access your [place] medical records  [id number]        Your Vitals Were     Pulse Temperature Respirations Height Last Period BMI (Body Mass Index)    78 98  F (36.7  C) (Temporal) 18 5' 2\" (1.575 m) 2013 26.89 kg/m2       Blood Pressure from Last 3 Encounters:   17 108/64   17 119/78   10/21/16 105/65    Weight from Last 3 Encounters:   17 147 lb (66.7 kg)   17 151 lb (68.5 kg)   17 151 lb (68.5 kg)              We Performed the Following     HPV High Risk Types DNA Cervical     Pap imaged thin layer screen with HPV - recommended age 30 - 65 years (select HPV order below)        Primary Care Provider Office Phone # Fax #    Huong Aleah Greenwood [phone]       [facility] [address] [place] [address]        Thank you!     Thank you for choosing [facility]  for your care. Our goal is always to provide you with excellent care. Hearing back from our patients is one way we can continue to improve our services. Please take a few minutes to complete the written survey that you may receive in the mail after your visit with us. Thank you!             Your Updated Medication List - Protect others around you: Learn how to safely use, store and throw away your medicines at www.disposemymeds.org.          This list is accurate as of: " 4/5/17 11:52 PM.  Always use your most recent med list.                   Brand Name Dispense Instructions for use    ACIDOPHILUS PROBIOTIC BLEND Caps      1 OTC tab daily       B COMPLEX PO      Take  by mouth. 1 daily       Black Cohosh 540 MG Caps    CVS BLACK COHOSH     1 tablet daily, uncertain strength       CALCIUM & MAGNESIUM CARBONATES PO      Take  by mouth. WITH ZINC:1 TABLET ONCE A DAY       cinnamon 500 MG Caps      Take  by mouth. 1 TABLET ONCE A DAY       FLAX PO      Take 1 capsule by mouth daily       GELATIN      1 ONCE A DAY       GREEN TEA PO      Take  by mouth. 1 TABLET ONCE A DAY       MENOPAUSE RELIEF PO          MOTRIN IB PO      Take 400 mg by mouth every 4 hours as needed for moderate pain       MULTI-ENZYME PO      Take 1 capsule by mouth daily       MULTIVITAMIN PO      1 tablet daily       order for DME     1 Device    Equipment being ordered: EXOS, XS, short arm       VITAMIN D (CHOLECALCIFEROL) PO      Take 2,000 Units by mouth daily

## 2017-04-05 NOTE — NURSING NOTE
"Chief Complaint   Patient presents with     Physical     Finger     trigger finger       Initial /64  Pulse 78  Temp 98  F (36.7  C) (Temporal)  Resp 18  Ht 5' 2\" (1.575 m)  Wt 147 lb (66.7 kg)  LMP 11/01/2013  BMI 26.89 kg/m2 Estimated body mass index is 26.89 kg/(m^2) as calculated from the following:    Height as of this encounter: 5' 2\" (1.575 m).    Weight as of this encounter: 147 lb (66.7 kg).  Medication Reconciliation: complete   Suha LEWIS MA      "

## 2017-04-05 NOTE — LETTER
April 17, 2017    Mary Carmen Jaime  [id number]  [place] [address]    Dear Mary Carmen,  We are happy to inform you that your PAP smear result from 4/5/17 is normal.  We are now able to do a follow up test on PAP smears. The DNA test is for HPV (Human Papilloma Virus). Cervical cancer is closely linked with certain types of HPV. Your result showed no evidence of high risk HPV.  Therefore we recommend you return in 3 years for your next pap smear.  You will still need to return to the clinic every year for an annual exam and other preventive tests.  Please contact the clinic at [phone] with any questions.  Sincerely,    Huong Greenwood MD/dana

## 2017-04-05 NOTE — PROGRESS NOTES
SUBJECTIVE:     CC: Mary Carmen Jaime is an 56 year old woman who presents for preventive health visit.     Last pap:   Lab Results   Component Value Date    PAP NIL 03/26/2014     Last Tetanus:  9/28/10  Last Dexa na   Last Colonoscopy 1/10/11 normal  Do you take a Calcium supplement Yes  Do you exercise Yes  Do you wear a seatbelt Yes  Last Mammogram 2/27/17 normal  Do you perform your own monthly self breast exam?  Yes  Do you use sunscreen? Yes  Is NOT fasting today  Periods- postmenopausal       Healthy Habits:    Do you get at least three servings of calcium containing foods daily (dairy, green leafy vegetables, etc.)? yes    Amount of exercise or daily activities, outside of work: 1-2 day(s) per week    Problems taking medications regularly No    Medication side effects: No    Have you had an eye exam in the past two years? yes    Do you see a dentist twice per year? yes      Other concerns to address: Patient mentions that she is having the same issues with her left thumb that she was experiencing with her right trigger finger. She had surgery on her right thumb to treat her trigger finger, but is wondering if there is anything she can do to treat it, and avoid surgery.       Today's PHQ-2 Score: 0    Abuse: Current or Past(Physical, Sexual or Emotional)- No  Do you feel safe in your environment - Yes    Social History   Substance Use Topics     Smoking status: Never Smoker     Smokeless tobacco: Never Used     Alcohol use 0.0 oz/week     0 Standard drinks or equivalent per week      Comment: 0-3 drinks per week     The patient does not drink >3 drinks per day nor >7 drinks per week.    Last Mammo:Ma Screening Digital Bilateral    Result Date: 2/27/2017  Narrative: MA SCREENING DIGITAL BILATERAL  With CAD 2/27/2017 2:28 PM BREAST SYMPTOMS: No current breast complaints. Family history of breast cancer in her grandmother. Personal history of benign left breast needle biopsy in 2011 with clip placement.  COMPARISON:  2/25/2016, 2/23/2015, 2/20/2014, 2/11/2013. BREAST DENSITY: Scattered fibroglandular densities COMMENTS: No findings of suspicion for malignancy. Biopsy clip is again seen in the posterior inner upper left breast. The area of asymmetry in this region has decreased in prominence since the prior study.     Ma Screening Digital Bilateral    Result Date: 2/25/2016  Narrative: SCREENING MAMMOGRAM, BILATERAL, DIGITAL w/CAD - 2/25/2016 2:47 PM BREAST SYMPTOMS: No current breast complaints. COMPARISON:  2/23/15, 2/20/14, 2/11/13, 2/9/12. BREAST DENSITY: Scattered fibroglandular densities. COMMENTS: No findings of suspicion for malignancy.          Ma Screening Digital Bilateral    Result Date: 2/24/2015  Narrative: MA SCREENING DIGITAL BILATERAL With CAD  2/23/2015  1:51 PM BREAST SYMPTOMS: No current breast complaints. Family history of breast cancer in her grandmother. Personal history of benign left breast needle biopsy. . COMPARISON: 2/2/14, 2/11/13, 12/20/10, 12/16/09. BREAST DENSITY: Scattered fibroglandular densities. COMMENTS: No findings of suspicion for malignancy. Biopsy clip is seen in an area of asymmetric density in the upper posterior inner left breast, similar to the prior study.     Ma Screening Digital Bilateral    Result Date: 2/20/2014  Narrative: MA SCREENING DIGITAL BILATERAL 2/20/2014 2:49 PM HISTORY:  Screening.  No new breast complaints. Family history of breast cancer in her maternal grandmother in her 50s. Personal history of benign left breast needle biopsy in February 2011. COMPARISON:  2/11/2013, 2/9/2012, 2/12/2010, 12/16/2009 TECHNIQUE:  Digital mammography with CAD is performed. BREAST DENSITY: Scattered fibroglandular densities. COMMENTS: No findings of suspicion for malignancy. Biopsy clip is seen in the posterior inner upper left breast (in an area of stable asymmetric density), similar to the prior study.       Last lipid profile:    Total Cholesterol:   Cholesterol   Date  Value Ref Range Status   03/16/2015 214 (H) <200 mg/dL Final     Comment:     LDL Cholesterol is the primary guide to therapy.   The NCEP recommends further evaluation of: patients with cholesterol greater   than 200 mg/dL if additional risk factors are present, cholesterol greater   than   240 mg/dL, triglycerides greater than 150 mg/dL, or HDL less than 40 mg/dL.       LDL Cholesterol:   LDL Cholesterol Calculated   Date Value Ref Range Status   03/16/2015 107 0 - 129 mg/dL Final     Comment:     LDL Cholesterol is the primary guide to therapy: LDL-cholesterol goal in high   risk patients is <100 mg/dL and in very high risk patients is <70 mg/dL.       HDL Cholesterol:   HDL Cholesterol   Date Value Ref Range Status   03/16/2015 94 >50 mg/dL Final       Reviewed orders with patient.  Reviewed health maintenance and updated orders accordingly - Yes        History of abnormal Pap smear: NO - age 30- 65 PAP every 3 years recommended  All Histories reviewed and updated in Epic.    Patient Active Problem List    Diagnosis Date Noted     Left carpal tunnel syndrome 10/31/2016     Priority: Medium     Trigger finger of right thumb 06/22/2016     Priority: Medium     Bilateral carpal tunnel syndrome 06/22/2016     Priority: Medium     Advanced directives, counseling/discussion 01/11/2012     Advance Directive Problem List Overview:   Name Relationship Phone    Primary Health Care Agent            Alternative Health Care Agent          Discussed advance care planning with patient; however, patient declined at this time. 1/11/2012  Ann-Marie/ARA          CARDIOVASCULAR SCREENING; LDL GOAL LESS THAN 160 10/31/2010     Rosacea 08/17/2007     FAMILY HX CONDITION NEC 08/17/2007     Sister with kidey abnormality. Hypertrophy and rupture          Past Medical History:   Diagnosis Date     ASCUS on Pap smear 8/7/2011    10/15/09 pap ASCUS neg HPV. Plan-- repeat screening pap smear in one year (due 10/15/2010) Pap 11/10/10 NIL.   Repeat 3 years.        NO ACTIVE PROBLEMS         Past Surgical History:   Procedure Laterality Date     C APPENDECTOMY  1996     COLONOSCOPY  1/10/2011    normal repeat 10 years     LAPAROSCOPIC TUBAL LIGATION  3/9/2012    Procedure:LAPAROSCOPIC TUBAL LIGATION; Laparoscopic Tubal; Surgeon:RUPAL FREEMAN; [facility]     RELEASE CARPAL TUNNEL Right 6/24/2016    Procedure: RELEASE CARPAL TUNNEL;  Surgeon: Parmjit Peña DO;  [facility]     RELEASE CARPAL TUNNEL Left 10/21/2016    Procedure: RELEASE CARPAL TUNNEL;  Surgeon: Parmjit Peña DO;  [facility]     RELEASE TRIGGER FINGER Right 6/24/2016    Procedure: RELEASE TRIGGER FINGER;  Surgeon: Parmjit Peña DO;  Location[facility]        Family History   Problem Relation Age of Onset     DIABETES Father      AOD     DIABETES Sister      JOD     CANCER Father      salivary glands     HEART DISEASE Father      1st MI in 40's     CANCER Paternal Uncle      Prostate     Genitourinary Problems Sister      kidney abnormality, hypertrophy and rupture          reports that she currently engages in sexual activity and has had male partners. She reports using the following method of birth control/protection: Surgical.     REVIEW OF SYSTEMS:   Ears/Nose/Throat: negative  Dental exam: negative, just going through lots of fillings  Eyes: negative.  Last eye exam: UTD.  Correction: none  Respiratory: negative  Cardiovascular: Positive for painful varicose veins on her legs.    Gastrointestinal: negative  Genitourinary: negative  Musculoskeletal: Positive for left thumb trigger finger, as above. Positive for right styloid and radius fracture, currently in a brace. Following with ortho  Neurologic: negative   Skin: negative   Psychiatric: negative   Hematologic/Lymphatic/Immunologic:  negative    Endocrine:  negative   GYN: Periods as above.  No concerns for STD's.       OBJECTIVE:       /64  Pulse 78  Temp 98  F (36.7  C)  "(Temporal)  Resp 18  Ht 5' 2\" (1.575 m)  Wt 147 lb (66.7 kg)  LMP 11/01/2013  BMI 26.89 kg/m2    HEENT:  Normocephalic, non-tender.  Eyes PERRLA, EOMI.  Ears TM's are pearly.  Nose patent. Throat without erythema.         NECK:  Supple with no adenopathy or thyromegaly.  No bruits.       LUNGS:  Clear to auscultation.     HEART:  Regular rhythm and rate, normal S1, S2, without murmur, rub, click, or gallop.   Vascular:   Bilateral leg varicose veins, worse on right than left. Mostly around knees and extending up on to thighs.     ABDOMEN:  Bowel sounds are present throughout.  The abdomen is soft, nontender, no hepatosplenomegaly or masses appreciated.    NEURO:  CNII - XII grossly intact and Strength and sensation grossly intact     SKIN:  without suspicious lesions.    MSK:  FROM upper and lower extremities except right wrist brace in place.  Left thumb no catching or locking witnessed.  FROM.     BREAST EXAM: No skin changes.  No nipple retraction or discharge.  No adenopathy palpated.  No distinct masses or nodules are palpable.    PELVIC EXAM:  EG negative for lesion or foreign body.  Vaginal mucosa is moist and pink without discharge.  Cervix is normal without lesions.   Pap smear and endocervical sampling obtained without incident.  Bimanual exam reveals: firm, nontender, nongravid uterus without CMT.  Adenexa are mobile and non-tender bilaterally.    Rectovaginal exam deferred.      ATP III Guidelines  FRAX Risk Assessment  ICSI Preventive Guidelines    ASSESSMENT/PLAN:         ICD-10-CM    1. Encounter for routine adult health examination without abnormal findings Z00.00    2. Pain of left thumb M79.645    3. Screening for osteoporosis Z13.820 DX Hip/Pelvis/Spine   4. Screening for malignant neoplasm of cervix Z12.4 Pap imaged thin layer screen with HPV - recommended age 30 - 65 years (select HPV order below)     HPV High Risk Types DNA Cervical       PLAN:  - PAP and HPV collected and pending, will " notify with results and discuss further evaluation/ treatment if needed at that time.   - Mammogram recommended yearly. Her last Mammogram was normal in 2/2017. Will repeat in 1 year. Continue with routine self breast exams and notify me with any questions/ concerns.   - Colonoscopy normal in 1/2011, instructed to repeat in 10 years. She will notify me with any concerns in the mean time.   - Dexa scan recommended and ordered, see orders. Patient will have this done at her earliest convenience. Will notify with results and discuss further evaluation/ treatment if needed at that time.   - Discussed patient's trigger finger. We reviewed a variety of things she can do to try to improve this, including bracing, rest, massage, stretching injections, surgery. She will try just not using it as much, and notify me if her symptoms persist, and if she is interested in a splint, injection, or referral to surgery.   - Discussed patient's varicose veins. Encouraged her to wear compression stockings often. If this continues to cause pain, she will notify me and I can set her up with a vascular surgeon if desired.   - Exercise encouraged minimum 30 minutes daily  - Monitor size and characteristics of moles, f/u with any changes  - Follow up in 1 year for routine physical, or sooner if needed.     COUNSELING:  Reviewed preventive health counseling, as reflected in patient instructions       Regular exercise       Healthy diet/nutrition       Vision screening       Hearing screening   reports that she has never smoked. She has never used smokeless tobacco.    Body mass index is 26.89 kg/(m^2).  Weight management plan: Discussed healthy diet and exercise guidelines and patient will follow up in 12 months in clinic to re-evaluate.    This document serves as a record of services personally performed by Huong Greenwood MD. It was created on their behalf by Dulce Maria Diaz, a trained medical scribe. The creation of this record is  based on the provider's personal observations and the statements of the patient. This document has been checked and approved by the attending provider.    Huong Greenwood MD

## 2017-04-07 LAB
COPATH REPORT: NORMAL
PAP: NORMAL

## 2017-04-11 ENCOUNTER — OFFICE VISIT (OUTPATIENT)
Dept: ORTHOPEDICS | Facility: CLINIC | Age: 57
End: 2017-04-11
Payer: COMMERCIAL

## 2017-04-11 ENCOUNTER — RADIANT APPOINTMENT (OUTPATIENT)
Dept: GENERAL RADIOLOGY | Facility: CLINIC | Age: 57
End: 2017-04-11
Attending: PHYSICAL MEDICINE & REHABILITATION
Payer: COMMERCIAL

## 2017-04-11 VITALS — HEART RATE: 63 BPM | HEIGHT: 62 IN | BODY MASS INDEX: 27.05 KG/M2 | WEIGHT: 147 LBS

## 2017-04-11 DIAGNOSIS — S52.514D CLOSED NONDISPLACED FRACTURE OF STYLOID PROCESS OF RIGHT RADIUS WITH ROUTINE HEALING, SUBSEQUENT ENCOUNTER: ICD-10-CM

## 2017-04-11 DIAGNOSIS — S52.514D CLOSED NONDISPLACED FRACTURE OF STYLOID PROCESS OF RIGHT RADIUS WITH ROUTINE HEALING, SUBSEQUENT ENCOUNTER: Primary | ICD-10-CM

## 2017-04-11 LAB
FINAL DIAGNOSIS: NORMAL
HPV HR 12 DNA CVX QL NAA+PROBE: NEGATIVE
HPV16 DNA SPEC QL NAA+PROBE: NEGATIVE
HPV18 DNA SPEC QL NAA+PROBE: NEGATIVE
SPECIMEN DESCRIPTION: NORMAL

## 2017-04-11 PROCEDURE — 99213 OFFICE O/P EST LOW 20 MIN: CPT | Performed by: PHYSICAL MEDICINE & REHABILITATION

## 2017-04-11 PROCEDURE — 73110 X-RAY EXAM OF WRIST: CPT | Mod: TC

## 2017-04-11 NOTE — PROGRESS NOTES
Sports Medicine Clinic Visit - Interim History April 11, 2017    Initial Visit Date 3/14/2017  Initial Injury Date 3/11/17    PCP: Huong Greenwood NEHEMIAS Jaime is a 56 year old female who is seen in f/u up for right radius fracture. Since last visit on 3/20/2017 patient has not had any pain. She feels that the hand is stronger. She rates the pain at a 0/10 currently but she does have some stiffness.  Symptoms are relieved with immobilzation and worsened by movement of the wrist due to stiffness with the EXOS off.     - Now ~ 4 weeks from initial injury      Review of Systems  Musculoskeletal: as above  Remainder of review of systems is negative including constitutional, eyes, ENT, CV, pulmonary, GI, , endocrine, skin, hematologic, and neurologic except as noted in HPI or medical history.    History reviewed. No pertinent past surgical/medical/family/social history.    Past Medical History:   Diagnosis Date     ASCUS on Pap smear 8/7/2011    10/15/09 pap ASCUS neg HPV. Plan-- repeat screening pap smear in one year (due 10/15/2010) Pap 11/10/10 NIL.  Repeat 3 years.        NO ACTIVE PROBLEMS      Past Surgical History:   Procedure Laterality Date     C APPENDECTOMY  1996     COLONOSCOPY  1/10/2011    normal repeat 10 years     LAPAROSCOPIC TUBAL LIGATION  3/9/2012    Procedure:LAPAROSCOPIC TUBAL LIGATION; Laparoscopic Tubal; Surgeon:RUPAL FREEMAN; [facility]     RELEASE CARPAL TUNNEL Right 6/24/2016    Procedure: RELEASE CARPAL TUNNEL;  Surgeon: Parmjit Peña DO;  [facility]     RELEASE CARPAL TUNNEL Left 10/21/2016    Procedure: RELEASE CARPAL TUNNEL;  Surgeon: Parmjit Peña DO;  [facility]     RELEASE TRIGGER FINGER Right 6/24/2016    Procedure: RELEASE TRIGGER FINGER;  Surgeon: Parmjit Peña DO;  [facility]     Family History   Problem Relation Age of Onset     DIABETES Father      AOD     DIABETES Sister      JOD     CANCER Father       "salivary glands     HEART DISEASE Father      1st MI in 40's     CANCER Paternal Uncle      Prostate     Genitourinary Problems Sister      kidney abnormality, hypertrophy and rupture     Social History     Social History     Marital status:      Spouse name: Denzel     Number of children: 2     Years of education: N/A     Occupational History      [employer]     Social History Main Topics     Smoking status: Never Smoker     Smokeless tobacco: Never Used     Alcohol use 0.0 oz/week     0 Standard drinks or equivalent per week      Comment: 0-3 drinks per week     Drug use: No     Sexual activity: Yes     Partners: Male     Birth control/ protection: Surgical      Comment: tubal     Other Topics Concern     Not on file     Social History Narrative       She works at the [employer] as a   At baseline, she does not need assistance with ambulation    Current Outpatient Prescriptions   Medication     Specialty Vitamins Products (MENOPAUSE RELIEF PO)     order for DME     MOTRIN IB PO     VITAMIN D, CHOLECALCIFEROL, PO     Flaxseed, Linseed, (FLAX PO)     Probiotic Product (ACIDOPHILUS PROBIOTIC BLEND) CAPS     Black Cohosh ([employer] BLACK COHOSH) 540 MG CAPS     B Complex Vitamins (B COMPLEX PO)     Digestive Enzymes (MULTI-ENZYME PO)     Cinnamon 500 MG CAPS     Green Tea, Camillia sinensis, (GREEN TEA PO)     GELATIN     CALCIUM & MAGNESIUM CARBONATES PO     MULTIVITAMIN OR     No current facility-administered medications for this visit.      Allergies   Allergen Reactions     Cephalosporins Rash     Amoxicillin Rash     Penicillins Rash         Objective:  Pulse 63  Ht 5' 2\" (1.575 m)  Wt 147 lb (66.7 kg)  LMP 11/01/2013  BMI 26.89 kg/m2    General: healthy, alert and in no distress    Head: Normocephalic, atraumatic  Eyes: no scleral icterus or conjunctival erythema   Oropharynx:  Mucous membranes moist  Skin: no erythema, ecchymosis, petechiae, or jaundice  CV: regular rhythm by palpation, " 2+ distal pulses, no pedal edema    Resp: normal respiratory effort without conversational dyspnea   Psych: normal mood and affect    Gait: Non-antalgic, appropriate coordination and balance   Neuro: normal light touch sensory exam of the extremities. Motor strength as noted below    Musculoskeletal:    Bilateral Wrist and Hand exam    Inspection:       No swelling, bruising or deformity bilaterally    Palpation:  Neither wrist or hand is tender    ROM:  Full wrist/hand/finger ROM on the left.  Decreased forearm supination/pronation and wrist flexion/extension on the right.    Strength:  5/5 bilateral shoulder abduction, elbow flexion/extension.  5/5 left forearm supination/pronation, wrist flexion/extension,  strength, finger abduction.  4+/5 right forearm supination/pronation, 4-/5 wrist flexion, 3+/5 wrist extension, 4/5  strength, 4/5 finger abduction.      Neurovascular:       2+ radial pulses bilaterally with brisk capillary refill and normal sensation to light touch in the radial, median and ulnar nerve distributions    Radiology:  X-rays ordered and independent visualization of images performed.  Shows callus formation at the radial fracture site.  Full radiology report to follow.    Recent Results (from the past 744 hour(s))   XR Wrist Right G/E 3 Views    Narrative    WRIST RIGHT THREE OR MORE VIEWS March 20, 2017 1:12 PM     HISTORY: Wrist pain. Nondisplaced fracture of right radial styloid  process, initial encounter for closed fracture.    COMPARISON: 3/11/2017.    FINDINGS: Mild degenerative change at the thumb CMC joint.      Impression    IMPRESSION: No significant change in position of the distal radius  fracture, which includes a transverse component and a radial styloid  component. No significant change with respect to the small fracture at  the dorsal aspect of the triquetrum.    WINSOME LU MD       Assessment:  1. Closed nondisplaced fracture of styloid process of right radius with  routine healing, subsequent encounter        Plan:  Discussed the assessment with the patient. We discussed the following treatment options: symptom treatment, activity modification/rest, imaging and rehab. Following discussion, plan:  -Fracture has been healing well radiographically and clinically.  We will discontinue use of the Exos splint.  She has significant stiffness, however, so we will order occupational therapy.  Please do 5-6 days of exercises per week between formal sessions and the home exercises they provide.  I would prefer her not to be immobilized further but if she has significant pain at work, we can provide her with a soft brace.  Ice 15-20 minutes for soreness as needed.  Follow Up: 3 weeks or sooner if symptoms fail to improve or worsen. Call with any questions or concerns    Mercedes Esparza MD, CAQ  [facility]

## 2017-04-11 NOTE — PATIENT INSTRUCTIONS
Today's Plan of Care:  -Rehab: [phone]. Please do 5-6 days of exercises per week between formal sessions and the home exercises they provide.  -Call if she has significant pain with work to  quickfit brace.  -Ice 15-20 minutes for soreness as needed        Follow Up: 3 weeks or sooner if symptoms fail to improve or worsen. Call with any questions or concerns.

## 2017-04-11 NOTE — MR AVS SNAPSHOT
After Visit Summary   4/11/2017    Mary Carmen Jaime    MRN: [id number]           Patient Information     Date Of Birth          1960        Visit Information        Provider Department      4/11/2017 2:20 PM Radha Esparza MD [facility]        Today's Diagnoses     Closed nondisplaced fracture of styloid process of right radius with routine healing, subsequent encounter    -  1      Care Instructions    Today's Plan of Care:  -Rehab: Occupational Therapy: [facility] - [phone]. Please do 5-6 days of exercises per week between formal sessions and the home exercises they provide.  -Call if she has significant pain with work to  quickfit brace.  -Ice 15-20 minutes for soreness as needed        Follow Up: 3 weeks or sooner if symptoms fail to improve or worsen. Call with any questions or concerns.             Follow-ups after your visit        Additional Services     OCCUPATIONAL THERAPY REFERRAL       *This therapy referral will be filtered to a centralized scheduling office at [facility] and the patient will receive a call to schedule an appointment at a [facility] location most convenient for them. *     [facility] provides Occupational Therapy evaluation and treatment and many specialty services across the [place] system.  If requesting a specialty program, please choose from the list below.    If you have not heard from the scheduling office within 2 business days, please call [phone] for all locations, with the exception of [place], please call [phone].     Treatment: Evaluation & Treatment  Special Instructions/Modalities: None  Special Programs: None    Please be aware that coverage of these services is subject to the terms and limitations of your health insurance plan.  Call member services at your health plan with any benefit or coverage questions.      **Note to Provider:  If you  "are referring outside of [place] for the therapy appointment, please list the name of the location in the  special instructions  above, print the referral and give to the patient to schedule the appointment.                  Your next 10 appointments already scheduled     Apr 14, 2017  2:30 PM CDT   DX HIP/PELVIS/SPINE with PHDX1   [facility] ([facility])    [address] [place] [id number]   [phone]           Please do not take any of the following 48 hours prior to your exam: vitamins, calcium tablets, antacids.              Who to contact     If you have questions or need follow up information about today's clinic visit or your schedule please contact [facility] directly at [phone].  Normal or non-critical lab and imaging results will be communicated to you by Assemblagehart, letter or phone within 4 business days after the clinic has received the results. If you do not hear from us within 7 days, please contact the clinic through Assemblagehart or phone. If you have a critical or abnormal lab result, we will notify you by phone as soon as possible.  Submit refill requests through CompareAway or call your pharmacy and they will forward the refill request to us. Please allow 3 business days for your refill to be completed.          Additional Information About Your Visit        CompareAway Information     CompareAway lets you send messages to your doctor, view your test results, renew your prescriptions, schedule appointments and more. To sign up, go to [email or web address] . Click on \"Log in\" on the left side of the screen, which will take you to the Welcome page. Then click on \"Sign up Now\" on the right side of the page.     You will be asked to enter the access code listed below, as well as some personal information. Please follow the directions to create your username and password.     Your access code is: [email or web address]  Expires: 6/9/2017  3:43 PM     Your " "access code will  in 90 days. If you need help or a new code, please call your [facility] clinic or [phone].        Care EveryWhere ID     This is your Care EveryWhere ID. This could be used by other organizations to access your [facility] medical records  [id number]        Your Vitals Were     Pulse Height Last Period BMI (Body Mass Index)          63 5' 2\" (1.575 m) 2013 26.89 kg/m2         Blood Pressure from Last 3 Encounters:   17 108/64   17 119/78   10/21/16 105/65    Weight from Last 3 Encounters:   17 147 lb (66.7 kg)   17 147 lb (66.7 kg)   17 151 lb (68.5 kg)              We Performed the Following     OCCUPATIONAL THERAPY REFERRAL        Primary Care Provider Office Phone # Fax #    Huong Aleah Greenwood [phone]       [facility] [address] [place] [address]        Thank you!     Thank you for choosing [facility]  for your care. Our goal is always to provide you with excellent care. Hearing back from our patients is one way we can continue to improve our services. Please take a few minutes to complete the written survey that you may receive in the mail after your visit with us. Thank you!             Your Updated Medication List - Protect others around you: Learn how to safely use, store and throw away your medicines at www.disposemymeds.org.          This list is accurate as of: 17  2:47 PM.  Always use your most recent med list.                   Brand Name Dispense Instructions for use    ACIDOPHILUS PROBIOTIC BLEND Caps      1 OTC tab daily       B COMPLEX PO      Take  by mouth. 1 daily       Black Cohosh 540 MG Caps    CVS BLACK COHOSH     1 tablet daily, uncertain strength       CALCIUM & MAGNESIUM CARBONATES PO      Take  by mouth. WITH ZINC:1 TABLET ONCE A DAY       cinnamon 500 MG Caps      Take  by mouth. 1 TABLET ONCE A DAY       FLAX PO      Take 1 capsule by mouth daily       " GELATIN      1 ONCE A DAY       GREEN TEA PO      Take  by mouth. 1 TABLET ONCE A DAY       MENOPAUSE RELIEF PO          MOTRIN IB PO      Take 400 mg by mouth every 4 hours as needed for moderate pain       MULTI-ENZYME PO      Take 1 capsule by mouth daily       MULTIVITAMIN PO      1 tablet daily       order for DME     1 Device    Equipment being ordered: EXOS, XS, short arm       VITAMIN D (CHOLECALCIFEROL) PO      Take 2,000 Units by mouth daily

## 2017-04-14 ENCOUNTER — HOSPITAL ENCOUNTER (OUTPATIENT)
Dept: BONE DENSITY | Facility: CLINIC | Age: 57
Discharge: HOME | End: 2017-04-14
Attending: FAMILY MEDICINE | Admitting: FAMILY MEDICINE
Payer: COMMERCIAL

## 2017-04-14 DIAGNOSIS — Z13.820 SCREENING FOR OSTEOPOROSIS: ICD-10-CM

## 2017-04-14 PROCEDURE — 77080 DXA BONE DENSITY AXIAL: CPT

## 2017-04-15 ENCOUNTER — HOSPITAL ENCOUNTER (EMERGENCY)
Facility: CLINIC | Age: 57
Discharge: HOME | End: 2017-04-15
Attending: EMERGENCY MEDICINE | Admitting: EMERGENCY MEDICINE
Payer: COMMERCIAL

## 2017-04-15 VITALS
OXYGEN SATURATION: 100 % | TEMPERATURE: 97.5 F | WEIGHT: 145 LBS | RESPIRATION RATE: 18 BRPM | DIASTOLIC BLOOD PRESSURE: 81 MMHG | HEART RATE: 59 BPM | SYSTOLIC BLOOD PRESSURE: 160 MMHG | BODY MASS INDEX: 26.52 KG/M2

## 2017-04-15 DIAGNOSIS — R21 RASH AND OTHER NONSPECIFIC SKIN ERUPTION: ICD-10-CM

## 2017-04-15 DIAGNOSIS — T46.7X5A ADVERSE REACTION TO NIACIN, INITIAL ENCOUNTER: ICD-10-CM

## 2017-04-15 PROCEDURE — 25000131 ZZH RX MED GY IP 250 OP 636 PS 637: Performed by: EMERGENCY MEDICINE

## 2017-04-15 PROCEDURE — 99284 EMERGENCY DEPT VISIT MOD MDM: CPT | Mod: Z6 | Performed by: EMERGENCY MEDICINE

## 2017-04-15 PROCEDURE — 25000132 ZZH RX MED GY IP 250 OP 250 PS 637: Performed by: EMERGENCY MEDICINE

## 2017-04-15 PROCEDURE — 99283 EMERGENCY DEPT VISIT LOW MDM: CPT | Performed by: EMERGENCY MEDICINE

## 2017-04-15 RX ORDER — PREDNISONE 20 MG/1
40 TABLET ORAL DAILY
Qty: 10 TABLET | Refills: 0 | Status: SHIPPED | OUTPATIENT
Start: 2017-04-15 | End: 2017-04-20

## 2017-04-15 RX ORDER — DEXAMETHASONE 2 MG/1
10 TABLET ORAL ONCE
Status: COMPLETED | OUTPATIENT
Start: 2017-04-15 | End: 2017-04-15

## 2017-04-15 RX ORDER — CETIRIZINE HYDROCHLORIDE 10 MG/1
10 TABLET ORAL ONCE
Status: COMPLETED | OUTPATIENT
Start: 2017-04-15 | End: 2017-04-15

## 2017-04-15 RX ADMIN — DEXAMETHASONE 10 MG: 2 TABLET ORAL at 11:24

## 2017-04-15 RX ADMIN — CETIRIZINE HYDROCHLORIDE 10 MG: 10 TABLET, FILM COATED ORAL at 11:18

## 2017-04-15 NOTE — DISCHARGE INSTRUCTIONS
Drug Reaction (Other Type)  You are having a reaction to a drug you have taken. This may not be the same as an allergic reaction. It is an undesired, unfavorable reaction, or a side effect of a medicine. This can cause a variety of symptoms, including:    Dizziness or headache    Rash    Nausea, vomiting, or stomach pain    Diarrhea or constipation    Trouble breathing    High or low blood pressure  It can be an upset stomach from something like aspirin or ibuprofen, feeling faint after taking a blood pressure medicine, feeling anxious, and many other things. Symptoms of a drug reaction can range from very mild to very severe.  In most cases, the reaction goes away within 1 to 12 hours. However, it will probably occur again if you take this same medicine. Your health care provider will advise you whether to change how much, when, or how often you take this medicine. He or she may also advise you to discontinue this medicine or switch to another one.   Home care    Another medicine may be recommended to reduce your symptoms until the drug s effect wears off. Follow your health care provider s advice.    When the drug s effect has worn off, there should be no further problem as long as you don't take the same drug again.     Ask your health care provider if you should also avoid similar drugs. Write down the information so you will remember it.    Make certain the drug reaction is documented in your medical record.  Follow-up care  Follow up with your health care provider or as advised if your symptoms are not better within 24 hours.  When to seek medical advice  Call your health care provider right away if any of these occur.    New symptoms that concern you    Worsening of your current symptoms, including rash or facial swelling    Symptoms that are not relieved by the treatment advised    A fever of 1 degree higher than your normal temperature that persists for 24 to 48 hours, or as advised by your health care  provider  Call 911  Call emergency services right away if any of these occur:    Trouble breathing or swallowing, or wheezing    Hoarse voice or trouble speaking    Confusion    Extreme drowsiness or trouble awakening    Fainting or loss of consciousness    Rapid heart rate or slow heart rate    Very low or very high blood pressure    Vomiting blood, or large amounts of blood in stool    Seizure    9024-0842 [employer]. [address] [place] [address]. All rights reserved. This information is not intended as a substitute for professional medical care. Always follow your healthcare professional's instructions.

## 2017-04-15 NOTE — ED NOTES
Pt took weight loss supplement this morning and has redness over entire body.  No itching.  Niacin is in the supplement.  Thiamin, B1 and niacin.

## 2017-04-15 NOTE — ED AVS SNAPSHOT
[facility] Emergency Department    [address] [place] [address]    Phone:  [phone]    Fax:  [phone]                                       Mary Carmen Jaime   MRN: [id number]    Department:  [facility] Emergency Department   Date of Visit:  4/15/2017           After Visit Summary Signature Page     I have received my discharge instructions, and my questions have been answered. I have discussed any challenges I see with this plan with the nurse or doctor.    ..........................................................................................................................................  Patient/Patient Representative Signature      ..........................................................................................................................................  Patient Representative Print Name and Relationship to Patient    ..................................................               ................................................  Date                                            Time    ..........................................................................................................................................  Reviewed by Signature/Title    ...................................................              ..............................................  Date                                                            Time

## 2017-04-15 NOTE — ED AVS SNAPSHOT
[facility] Emergency Department    [address]     [place] [address]    Phone:  [phone]    Fax:  [phone]                                       Mary Carmen Jaime   MRN: [id number]    Department:  [facility] Emergency Department   Date of Visit:  4/15/2017           Patient Information     Date Of Birth          1960        Your diagnoses for this visit were:     Adverse reaction to niacin, initial encounter        You were seen by Gee Breaux MD.      Follow-up Information     Follow up with Huong Greenwood MD.    Specialty:  Family Practice    Why:  As needed    Contact information:    [facility]  [address] [place] [address] [phone]          Discharge Instructions         Drug Reaction (Other Type)  You are having a reaction to a drug you have taken. This may not be the same as an allergic reaction. It is an undesired, unfavorable reaction, or a side effect of a medicine. This can cause a variety of symptoms, including:    Dizziness or headache    Rash    Nausea, vomiting, or stomach pain    Diarrhea or constipation    Trouble breathing    High or low blood pressure  It can be an upset stomach from something like aspirin or ibuprofen, feeling faint after taking a blood pressure medicine, feeling anxious, and many other things. Symptoms of a drug reaction can range from very mild to very severe.  In most cases, the reaction goes away within 1 to 12 hours. However, it will probably occur again if you take this same medicine. Your health care provider will advise you whether to change how much, when, or how often you take this medicine. He or she may also advise you to discontinue this medicine or switch to another one.   Home care    Another medicine may be recommended to reduce your symptoms until the drug s effect wears off. Follow your health care provider s advice.    When the drug s effect has worn off, there should be no further  problem as long as you don't take the same drug again.     Ask your health care provider if you should also avoid similar drugs. Write down the information so you will remember it.    Make certain the drug reaction is documented in your medical record.  Follow-up care  Follow up with your health care provider or as advised if your symptoms are not better within 24 hours.  When to seek medical advice  Call your health care provider right away if any of these occur.    New symptoms that concern you    Worsening of your current symptoms, including rash or facial swelling    Symptoms that are not relieved by the treatment advised    A fever of 1 degree higher than your normal temperature that persists for 24 to 48 hours, or as advised by your health care provider  Call 911  Call emergency services right away if any of these occur:    Trouble breathing or swallowing, or wheezing    Hoarse voice or trouble speaking    Confusion    Extreme drowsiness or trouble awakening    Fainting or loss of consciousness    Rapid heart rate or slow heart rate    Very low or very high blood pressure    Vomiting blood, or large amounts of blood in stool    Seizure    7638-9768 [employer]. [address] [place] [address]. All rights reserved. This information is not intended as a substitute for professional medical care. Always follow your healthcare professional's instructions.          Future Appointments        Provider Department Dept Phone Center    4/25/2017 3:00 PM Esther Leiva OT, OT [facility] [phone] [facility]    5/2/2017 2:40 PM Radha Esparza MD [facility] [phone] [facility]      24 Hour Appointment Hotline       To make an appointment at any [facility], call [phone] ([phone]). If you don't have a family doctor or clinic, we will help you find one. [facility] are conveniently located to serve the needs of you and your  family.             Review of your medicines      START taking        Dose / Directions Last dose taken    predniSONE 20 MG tablet   Commonly known as:  DELTASONE   Dose:  40 mg   Quantity:  10 tablet        Take 2 tablets (40 mg) by mouth daily for 5 days   Refills:  0          Our records show that you are taking the medicines listed below. If these are incorrect, please call your family doctor or clinic.        Dose / Directions Last dose taken    ACIDOPHILUS PROBIOTIC BLEND Caps        1 OTC tab daily   Refills:  0        B COMPLEX PO        Take  by mouth. 1 daily   Refills:  0        Black Cohosh 540 MG Caps   Commonly known as:  [facility]S BLACK COHOSH        1 tablet daily, uncertain strength   Refills:  0        CALCIUM & MAGNESIUM CARBONATES PO        Take  by mouth. WITH ZINC:1 TABLET ONCE A DAY   Refills:  0        cinnamon 500 MG Caps        Take  by mouth. 1 TABLET ONCE A DAY   Refills:  0        FLAX PO   Dose:  1 capsule        Take 1 capsule by mouth daily   Refills:  0        GELATIN        1 ONCE A DAY   Refills:  0        GREEN TEA PO        Take  by mouth. 1 TABLET ONCE A DAY   Refills:  0        MENOPAUSE RELIEF PO        Refills:  0        MOTRIN IB PO   Dose:  400 mg        Take 400 mg by mouth every 4 hours as needed for moderate pain   Refills:  0        MULTI-ENZYME PO   Dose:  1 capsule        Take 1 capsule by mouth daily   Refills:  0        MULTIVITAMIN PO        1 tablet daily   Refills:  0        order for DME   Quantity:  1 Device        Equipment being ordered: EXOS, XS, short arm   Refills:  0        VITAMIN D (CHOLECALCIFEROL) PO   Dose:  2000 Units        Take 2,000 Units by mouth daily   Refills:  0                Prescriptions were sent or printed at these locations (1 Prescription)                   Other Prescriptions                Printed at Department/Unit printer (1 of 1)         predniSONE (DELTASONE) 20 MG tablet                Orders Needing Specimen Collection     None     "  Pending Results     No orders found from 2017 to 2017.            Pending Culture Results     No orders found from 2017 to 2017.            Thank you for choosing [facility]       Thank you for choosing [facility] for your care. Our goal is always to provide you with excellent care. Hearing back from our patients is one way we can continue to improve our services. Please take a few minutes to complete the written survey that you may receive in the mail after you visit with us. Thank you!        [employer] Information     Net[employer]ity lets you send messages to your doctor, view your test results, renew your prescriptions, schedule appointments and more. To sign up, go to [email or web address] . Click on \"Log in\" on the left side of the screen, which will take you to the Welcome page. Then click on \"Sign up Now\" on the right side of the page.     You will be asked to enter the access code listed below, as well as some personal information. Please follow the directions to create your username and password.     Your access code is: [email or web address]  Expires: 2017  3:43 PM     Your access code will  in 90 days. If you need help or a new code, please call your [facility] clinic or [phone].        Care EveryWhere ID     This is your Care EveryWhere ID. This could be used by other organizations to access your [facility] medical records  [id number]        After Visit Summary       This is your record. Keep this with you and show to your community pharmacist(s) and doctor(s) at your next visit.                  "

## 2017-04-15 NOTE — ED PROVIDER NOTES
History     Chief Complaint   Patient presents with     Allergic Reaction     The history is provided by the patient.     Mary Carmen Jaime is a 56 year old female who presents to the ED for evaluation of an allergic reaction. Patient reports that she took a weight loss supplement for the 1st time this morning and now has redness all over her body. The supplement was called Niacin and included was Thiamin (B1). She denies itching or any other associated symptoms.  Currently has no facial swelling.  No swelling of the tongue or throat.  No difficulty speech or swallowing.  Denies neck pain, chest pain, shortness of breath or cough.  No abdominal pain, nausea vomiting or diarrhea.  No other new exposures, medicines, soaps, lotions or clothing.  No treatment prior to arrival.    Patient Active Problem List   Diagnosis     Rosacea     FAMILY HX CONDITION NEC     CARDIOVASCULAR SCREENING; LDL GOAL LESS THAN 160     Advanced directives, counseling/discussion     Trigger finger of right thumb     Bilateral carpal tunnel syndrome     Left carpal tunnel syndrome     Past Medical History:   Diagnosis Date     ASCUS on Pap smear 8/7/2011    10/15/09 pap ASCUS neg HPV. Plan-- repeat screening pap smear in one year (due 10/15/2010) Pap 11/10/10 NIL.  Repeat 3 years.          Past Surgical History:   Procedure Laterality Date     C APPENDECTOMY  1996     COLONOSCOPY  1/10/2011    normal repeat 10 years     LAPAROSCOPIC TUBAL LIGATION  3/9/2012    Procedure:LAPAROSCOPIC TUBAL LIGATION; Laparoscopic Tubal; Surgeon:RPUAL FREEMAN; [facility]     RELEASE CARPAL TUNNEL Right 6/24/2016    Procedure: RELEASE CARPAL TUNNEL;  Surgeon: Parmjit Peña DO;  [facility]     RELEASE CARPAL TUNNEL Left 10/21/2016    Procedure: RELEASE CARPAL TUNNEL;  Surgeon: Parmjit Peña DO;  [facility]     RELEASE TRIGGER FINGER Right 6/24/2016    Procedure: RELEASE TRIGGER FINGER;  Surgeon: Parmjit Peña DO;   Location[facility]       Family History   Problem Relation Age of Onset     DIABETES Father      AOD     DIABETES Sister      JOD     CANCER Father      salivary glands     HEART DISEASE Father      1st MI in 40's     CANCER Paternal Uncle      Prostate     Genitourinary Problems Sister      kidney abnormality, hypertrophy and rupture       Social History   Substance Use Topics     Smoking status: Never Smoker     Smokeless tobacco: Never Used     Alcohol use 0.0 oz/week     0 Standard drinks or equivalent per week      Comment: 0-3 drinks per week        Immunization History   Administered Date(s) Administered     Influenza (IIV3) 11/10/2010, 01/11/2012, 10/07/2013     Influenza Vaccine IM 3yrs+ 4 Valent IIV4 10/31/2016     TD (ADULT, 7+) 02/25/2000     TDAP Vaccine (Adacel) 09/28/2010          Allergies   Allergen Reactions     Cephalosporins Rash     Amoxicillin Rash     Penicillins Rash       Current Outpatient Prescriptions   Medication Sig Dispense Refill     predniSONE (DELTASONE) 20 MG tablet Take 2 tablets (40 mg) by mouth daily for 5 days 10 tablet 0     Specialty Vitamins Products (MENOPAUSE RELIEF PO)        order for DME Equipment being ordered: EXOS, XS, short arm 1 Device 0     MOTRIN IB PO Take 400 mg by mouth every 4 hours as needed for moderate pain       VITAMIN D, CHOLECALCIFEROL, PO Take 2,000 Units by mouth daily       Flaxseed, Linseed, (FLAX PO) Take 1 capsule by mouth daily        Probiotic Product (ACIDOPHILUS PROBIOTIC BLEND) CAPS 1 OTC tab daily       Black Cohosh (CVS BLACK COHOSH) 540 MG CAPS 1 tablet daily, uncertain strength       B Complex Vitamins (B COMPLEX PO) Take  by mouth. 1 daily       Digestive Enzymes (MULTI-ENZYME PO) Take 1 capsule by mouth daily        Cinnamon 500 MG CAPS Take  by mouth. 1 TABLET ONCE A DAY       Green Tea, Camillia sinensis, (GREEN TEA PO) Take  by mouth. 1 TABLET ONCE A DAY       GELATIN 1 ONCE A DAY       CALCIUM & MAGNESIUM CARBONATES PO Take  by mouth.  WITH ZINC:1 TABLET ONCE A DAY       MULTIVITAMIN OR 1 tablet daily         I have reviewed the Medications, Allergies, Past Medical and Surgical History, and Social History in the Epic system.    Review of Systems   Skin: Positive for rash.   All other systems reviewed and are negative.      Physical Exam   BP: 160/81  Pulse: 59  Temp: 97.5  F (36.4  C)  Resp: 18  Weight: 65.8 kg (145 lb)  SpO2: 100 %    Physical Exam   Constitutional: She is oriented to person, place, and time. She appears well-developed and well-nourished. No distress.   HENT:   Head: Normocephalic and atraumatic.   Mouth/Throat: Oropharynx is clear and moist.   There is no oral swelling.  Patient is able handle secretions without difficulty.  Her speech is clear.   Eyes: Conjunctivae and EOM are normal. Pupils are equal, round, and reactive to light. No scleral icterus.   Neck: Normal range of motion.   Cardiovascular: Normal rate, regular rhythm and normal heart sounds.    Pulmonary/Chest: Effort normal and breath sounds normal. She has no decreased breath sounds. She has no wheezes. She has no rhonchi. She has no rales.   Musculoskeletal: Normal range of motion.   Neurological: She is alert and oriented to person, place, and time.   Skin: Skin is warm and dry. Rash noted. No pallor.   She has a macular confluence rash on her face, arms, and extremities.  Few small areas noted on her trunk.  No distinct hives are noted.  There is no blisters.   Psychiatric: She has a normal mood and affect. Her behavior is normal.   Nursing note and vitals reviewed.    ED Course     ED Course     Procedures             Critical Care time:  none               Labs Ordered and Resulted from Time of ED Arrival Up to the Time of Departure from the ED - No data to display     Results for orders placed or performed during the hospital encounter of 04/14/17 (from the past 24 hour(s))   DX Hip/Pelvis/Spine    Narrative    DXA BONE MINERAL DENSITY SCAN  4/14/2017  2:36  PM    TECHNIQUE: The lumbar spine and bilateral hips were scanned with DXA  technique performed using a [employer] scanner. DXA results are  reported according to T-score. The T-score is the standard deviation  from the peak bone mass in a normal young adult patient. A T-score of  -1.0 to -2.5 correlates with osteopenia. A T-score of less than -2.5  correlates with osteoporosis.    In accordance with the ISCD (International Society of Clinical  Densitometry) the lowest BMD between the total hip and femoral neck  will be used.     INDICATION: Postmenopausal female for osteoporosis screening study.    COMPARISON: None    FINDINGS:   Lumbar spine: The T-score is -0.7 from L1-L3. L4 was not included due  to degenerative sclerosis.     Hips: The right hip T-score is -1.5. The left hip T-score is -1.8.  Bone mineral density in the worst hip is 0.791 gm/cm2.       Impression    IMPRESSION: Osteopenia with increased fracture risk.    PÉREZ CORRALES MD     Medications   dexamethasone (DECADRON) tablet 10 mg (not administered)   cetirizine (zyrTEC) tablet 10 mg (not administered)     Patient stated that she has to leave as she has a play in 45 minutes in the [facility].  She is given oral Decadron and oral Zyrtec.  Assessments & Plan (with Medical Decision Making)   Patient is a 56-year-old female presents with non-pruritic body rash after she took a dietary supplement called niacin this morning.  No other new exposures as noted above.  Denies any problems with speech or swallowing.  No neck pain.  No chest pain, shortness of breath or cough.  No abdominal pain, nausea, vomiting or diarrhea.  No similar reaction but this 1st time she's taken this medicine.  Patient was unable to stay in the department if she had to leave for a play.  On exam she had a confluent rash described above.  She had no oral swelling, stridor, or respiratory abnormality on auscultation.  Patient was given both oral Decadron and Zyrtec while  here.  Handout on rashes is provided.  If she has persistence after she arrives home from her play, she could add Benadryl to the treatment.  If she has worsening symptoms however she needs to immediately return to the emergency room.  She is given prednisone to take beginning tomorrow if symptoms persist after 24 hours.  I have reviewed the nursing notes.    I have reviewed the findings, diagnosis, plan and need for follow up with the patient.    New Prescriptions    PREDNISONE (DELTASONE) 20 MG TABLET    Take 2 tablets (40 mg) by mouth daily for 5 days       Final diagnoses:   Adverse reaction to niacin, initial encounter     This document serves as a record of services personally performed by Gee Breaux MD. It was created on their behalf by Bhargavi Welch, a trained medical scribe. The creation of this record is based on the provider's personal observations and the statements of the patient. This document has been checked and approved by the attending provider.    Note: Chart documentation done in part with Dragon Voice Recognition software. Although reviewed after completion, some word and grammatical errors may remain.        4/15/2017   [facility] EMERGENCY DEPARTMENT     Gee Breaux MD  04/15/17 2082

## 2017-04-17 ENCOUNTER — TELEPHONE (OUTPATIENT)
Dept: FAMILY MEDICINE | Facility: CLINIC | Age: 57
End: 2017-04-17

## 2017-04-17 NOTE — PROGRESS NOTES
Notify Mary Carmen that her bone density test is mildly abnormal. I recommend she continue on her vitamin D and make sure she is getting 1500 mg calcium daily, along with weight bearing exercise daily, and I will recommend a recheck DEXA in 2 years.   Huong Greenwood MD

## 2017-04-17 NOTE — TELEPHONE ENCOUNTER
Patient called back and I gave her the message of her results. She had no other questions at this time and understood   The results I gave her.       Thank you,  Leticia Montano   for [facility]

## 2017-04-17 NOTE — TELEPHONE ENCOUNTER
----- Message from Huong Greenwood MD sent at 4/17/2017 12:58 AM CDT -----  Notify Mary Carmen that her bone density test is mildly abnormal. I recommend she continue on her vitamin D and make sure she is getting 1500 mg calcium daily, along with weight bearing exercise daily, and I will recommend a recheck DEXA in 2 years.   Huong Greenwood MD

## 2017-05-02 ENCOUNTER — OFFICE VISIT (OUTPATIENT)
Dept: ORTHOPEDICS | Facility: CLINIC | Age: 57
End: 2017-05-02
Payer: COMMERCIAL

## 2017-05-02 VITALS — WEIGHT: 147 LBS | BODY MASS INDEX: 27.05 KG/M2 | HEIGHT: 62 IN | HEART RATE: 72 BPM

## 2017-05-02 DIAGNOSIS — S52.514D CLOSED NONDISPLACED FRACTURE OF STYLOID PROCESS OF RIGHT RADIUS WITH ROUTINE HEALING, SUBSEQUENT ENCOUNTER: Primary | ICD-10-CM

## 2017-05-02 PROCEDURE — 99213 OFFICE O/P EST LOW 20 MIN: CPT | Performed by: PHYSICAL MEDICINE & REHABILITATION

## 2017-05-02 NOTE — PROGRESS NOTES
Sports Medicine Clinic Visit - Interim History May 2, 2017    Initial Visit Date 3/14/2017  Initial Injury Date 3/11/17    PCP: Huong Greenwood NEHEMIAS Jaime is a 56 year old female who is seen in f/u up for right radius fracture. Since last visit on 4/11/2017 patient has been doing well. OT was scheduled out for ~2.5 weeks and her ROM increased with use of the wrist therefore she did not initiate OT.  Her ROM continued to improve however she is still lacking some extension of the wrist. She also notes a prominence at the 2nd MTP joint that was not there previous to having the EXOS on. She rates the pain at a 0/10 currently.  Symptoms are relieved with using her wrist with normal activities and worsened by nothing.       Review of Systems  Musculoskeletal: as above  Remainder of review of systems is negative including constitutional, eyes, ENT, CV, pulmonary, GI, , endocrine, skin, hematologic, and neurologic except as noted in HPI or medical history.    History reviewed. No pertinent past surgical/medical/family/social history.    Past Medical History:   Diagnosis Date     ASCUS on Pap smear 8/7/2011    10/15/09 pap ASCUS neg HPV. Plan-- repeat screening pap smear in one year (due 10/15/2010) Pap 11/10/10 NIL.  Repeat 3 years.        Past Surgical History:   Procedure Laterality Date     C APPENDECTOMY  1996     COLONOSCOPY  1/10/2011    normal repeat 10 years     LAPAROSCOPIC TUBAL LIGATION  3/9/2012    Procedure:LAPAROSCOPIC TUBAL LIGATION; Laparoscopic Tubal; Surgeon:RUPAL FREEMAN; Location[facility]     RELEASE CARPAL TUNNEL Right 6/24/2016    Procedure: RELEASE CARPAL TUNNEL;  Surgeon: Parmjit Peña DO;  [facility]     RELEASE CARPAL TUNNEL Left 10/21/2016    Procedure: RELEASE CARPAL TUNNEL;  Surgeon: Parmjit Peña DO;  [facility]     RELEASE TRIGGER FINGER Right 6/24/2016    Procedure: RELEASE TRIGGER FINGER;  Surgeon: Parmjit Peña DO;   "Location: [facility]     Family History   Problem Relation Age of Onset     DIABETES Father      AOD     DIABETES Sister      JOD     CANCER Father      salivary glands     HEART DISEASE Father      1st MI in 40's     CANCER Paternal Uncle      Prostate     Genitourinary Problems Sister      kidney abnormality, hypertrophy and rupture     Social History     Social History     Marital status:      Spouse name: Denzel     Number of children: 2     Years of education: N/A     Occupational History      [employer]     Social History Main Topics     Smoking status: Never Smoker     Smokeless tobacco: Never Used     Alcohol use 0.0 oz/week     0 Standard drinks or equivalent per week      Comment: 0-3 drinks per week     Drug use: No     Sexual activity: Yes     Partners: Male     Birth control/ protection: Surgical      Comment: tubal     Other Topics Concern     Not on file     Social History Narrative       She works at the [employer] as a   At baseline, she does not need assistance with ambulation    Current Outpatient Prescriptions   Medication     Specialty Vitamins Products (MENOPAUSE RELIEF PO)     order for DME     MOTRIN IB PO     VITAMIN D, CHOLECALCIFEROL, PO     Flaxseed, Linseed, (FLAX PO)     Probiotic Product (ACIDOPHILUS PROBIOTIC BLEND) CAPS     Black Cohosh (CVS BLACK COHOSH) 540 MG CAPS     B Complex Vitamins (B COMPLEX PO)     Digestive Enzymes (MULTI-ENZYME PO)     Cinnamon 500 MG CAPS     Green Tea, Camillia sinensis, (GREEN TEA PO)     GELATIN     CALCIUM & MAGNESIUM CARBONATES PO     MULTIVITAMIN OR     No current facility-administered medications for this visit.      Allergies   Allergen Reactions     Cephalosporins Rash     Amoxicillin Rash     Penicillins Rash         Objective:  Pulse 72  Ht 5' 2\" (1.575 m)  Wt 147 lb (66.7 kg)  LMP 11/01/2013  BMI 26.89 kg/m2    General: healthy, alert and in no distress    Head: Normocephalic, atraumatic  Eyes: no scleral icterus or " conjunctival erythema   Oropharynx:  Mucous membranes moist  Skin: no erythema, ecchymosis, petechiae, or jaundice  CV: regular rhythm by palpation, 2+ distal pulses, no pedal edema    Resp: normal respiratory effort without conversational dyspnea   Psych: normal mood and affect    Gait: Non-antalgic, appropriate coordination and balance   Neuro: normal light touch sensory exam of the extremities. Motor strength as noted below    Musculoskeletal:    Bilateral Wrist and Hand exam    Inspection:  Right small bony prominence over 2nd MCP joint    Palpation:  No tenderness to palpation of either wrist or hand    ROM:       Full and symmetric active and passive range of motion of the forearm, wrist and digits bilaterally with the exception of active right wrist extension which is mildly decreased as compared to the left; passive is full.    Strength:       5/5 strength in the muscles of the hand, wrist and forearm bilaterally    Neurovascular:       2+ radial pulses bilaterally with brisk capillary refill and normal sensation to light touch in the radial, median and ulnar nerve distributions    Radiology:  No imaging today  Recent Results (from the past 744 hour(s))   XR Wrist Right G/E 3 Views    Narrative    RIGHT WRIST THREE OR MORE VIEWS  4/11/2017 2:35 PM     HISTORY: Wrist pain. Nondisplaced fracture of right radial styloid  process, subsequent encounter for closed fracture with routine  healing.    COMPARISON: Right wrist x-rays dated 3/20/2017.      Impression    IMPRESSION:  Decreased lucency and increased sclerosis of the distal  radial metaphysis and styloid process indicate partial interval  healing of the distal radial fracture. No change in alignment. No  change in the distal dorsal triquetral fracture fragment. No other  changes.    PÉREZ CORRALES MD   DX Hip/Pelvis/Spine    Narrative    DXA BONE MINERAL DENSITY SCAN  4/14/2017  2:36 PM    TECHNIQUE: The lumbar spine and bilateral hips were scanned with  DXA  technique performed using a [employer] scanner. DXA results are  reported according to T-score. The T-score is the standard deviation  from the peak bone mass in a normal young adult patient. A T-score of  -1.0 to -2.5 correlates with osteopenia. A T-score of less than -2.5  correlates with osteoporosis.    In accordance with the ISCD (International Society of Clinical  Densitometry) the lowest BMD between the total hip and femoral neck  will be used.     INDICATION: Postmenopausal female for osteoporosis screening study.    COMPARISON: None    FINDINGS:   Lumbar spine: The T-score is -0.7 from L1-L3. L4 was not included due  to degenerative sclerosis.     Hips: The right hip T-score is -1.5. The left hip T-score is -1.8.  Bone mineral density in the worst hip is 0.791 gm/cm2.       Impression    IMPRESSION: Osteopenia with increased fracture risk.    PÉREZ CORRALES MD       Assessment:  1. Closed nondisplaced fracture of styloid process of right radius with routine healing, subsequent encounter        Plan:  Discussed the assessment with the patient. We discussed the following treatment options: symptom treatment, activity modification/rest, imaging and rehab. Following discussion, plan:  -Continue full duties at work.    -Continue to work on wrist extension at home.  -Follow up as needed.  Please call us with questions or concerns.        Mercedes Esparza MD, [facility]

## 2017-05-02 NOTE — MR AVS SNAPSHOT
"              After Visit Summary   2017    Mary Carmen Jaime    MRN: [id number]           Patient Information     Date Of Birth          1960        Visit Information        Provider Department      2017 2:40 PM Radha Esparza MD [facility]        Today's Diagnoses     Closed nondisplaced fracture of styloid process of right radius with routine healing, subsequent encounter    -  1      Care Instructions    Follow up: As needed. Call with any questions or concerns.           Follow-ups after your visit        Who to contact     If you have questions or need follow up information about today's clinic visit or your schedule please contact [facility] directly at [phone].  Normal or non-critical lab and imaging results will be communicated to you by MyChart, letter or phone within 4 business days after the clinic has received the results. If you do not hear from us within 7 days, please contact the clinic through MyChart or phone. If you have a critical or abnormal lab result, we will notify you by phone as soon as possible.  Submit refill requests through [employer] or call your pharmacy and they will forward the refill request to us. Please allow 3 business days for your refill to be completed.          Additional Information About Your Visit        MyChart Information     [facility]oc lets you send messages to your doctor, view your test results, renew your prescriptions, schedule appointments and more. To sign up, go to [email or web address] . Click on \"Log in\" on the left side of the screen, which will take you to the Welcome page. Then click on \"Sign up Now\" on the right side of the page.     You will be asked to enter the access code listed below, as well as some personal information. Please follow the directions to create your username and password.     Your access code is: [email or web address]  Expires: 2017  3:43 PM     Your access code will  in 90 days. " "If you need help or a new code, please call your [facility] clinic or [phone].        Care EveryWhere ID     This is your Care EveryWhere ID. This could be used by other organizations to access your [facility] medical records  [id number]        Your Vitals Were     Pulse Height Last Period BMI (Body Mass Index)          72 5' 2\" (1.575 m) 11/01/2013 26.89 kg/m2         Blood Pressure from Last 3 Encounters:   04/15/17 160/81   04/05/17 108/64   03/11/17 119/78    Weight from Last 3 Encounters:   05/02/17 147 lb (66.7 kg)   04/15/17 145 lb (65.8 kg)   04/11/17 147 lb (66.7 kg)              Today, you had the following     No orders found for display       Primary Care Provider Office Phone # Fax #    Huong Aleah Greenwood [phone]       [facility] [address] [place] [address]        Thank you!     Thank you for choosing [facility]  for your care. Our goal is always to provide you with excellent care. Hearing back from our patients is one way we can continue to improve our services. Please take a few minutes to complete the written survey that you may receive in the mail after your visit with us. Thank you!             Your Updated Medication List - Protect others around you: Learn how to safely use, store and throw away your medicines at www.disposemymeds.org.          This list is accurate as of: 5/2/17  2:52 PM.  Always use your most recent med list.                   Brand Name Dispense Instructions for use    ACIDOPHILUS PROBIOTIC BLEND Caps      1 OTC tab daily       B COMPLEX PO      Take  by mouth. 1 daily       Black Cohosh 540 MG Caps    CVS BLACK COHOSH     1 tablet daily, uncertain strength       CALCIUM & MAGNESIUM CARBONATES PO      Take  by mouth. WITH ZINC:1 TABLET ONCE A DAY       cinnamon 500 MG Caps      Take  by mouth. 1 TABLET ONCE A DAY       FLAX PO      Take 1 capsule by mouth daily       GELATIN      1 ONCE A DAY       GREEN TEA PO "      Take  by mouth. 1 TABLET ONCE A DAY       MENOPAUSE RELIEF PO          MOTRIN IB PO      Take 400 mg by mouth every 4 hours as needed for moderate pain       MULTI-ENZYME PO      Take 1 capsule by mouth daily       MULTIVITAMIN PO      1 tablet daily       order for DME     1 Device    Equipment being ordered: EXOS, XS, short arm       VITAMIN D (CHOLECALCIFEROL) PO      Take 2,000 Units by mouth daily

## 2017-05-02 NOTE — NURSING NOTE
"Chief Complaint   Patient presents with     Wrist right       Initial Pulse 72  Ht 5' 2\" (1.575 m)  Wt 147 lb (66.7 kg)  LMP 11/01/2013  BMI 26.89 kg/m2 Estimated body mass index is 26.89 kg/(m^2) as calculated from the following:    Height as of this encounter: 5' 2\" (1.575 m).    Weight as of this encounter: 147 lb (66.7 kg).  Medication Reconciliation: complete     Ofelai Pate M.Ed., ATR, ATC      "

## 2018-02-28 ENCOUNTER — HOSPITAL ENCOUNTER (OUTPATIENT)
Dept: MAMMOGRAPHY | Facility: CLINIC | Age: 58
Discharge: HOME | End: 2018-02-28
Attending: FAMILY MEDICINE | Admitting: FAMILY MEDICINE
Payer: COMMERCIAL

## 2018-02-28 DIAGNOSIS — Z12.31 VISIT FOR SCREENING MAMMOGRAM: ICD-10-CM

## 2018-02-28 PROCEDURE — 77067 SCR MAMMO BI INCL CAD: CPT

## 2018-03-21 ENCOUNTER — TELEPHONE (OUTPATIENT)
Dept: FAMILY MEDICINE | Facility: CLINIC | Age: 58
End: 2018-03-21

## 2018-03-21 NOTE — TELEPHONE ENCOUNTER
Mary Carmen Jaime is a 57 year old female who calls with feelings of tight chest that comes and goes (maybe a couple times a week). Not feeling it now. Also SOB at times-random, maybe a couple times a week.  States sometimes at night she can feel her heart racing-not currently.   This am on her way to work she had some tingling in her hand and her hand was white. It was up on her steering wheel and she states it could be due to lack of blood flow because it was above her heart.   Denies dizziness, nausea or vomiting, no pain in neck , shoulder, jaw. Denies CP/SOB with exertion.   Allergies:   Allergies   Allergen Reactions     Cephalosporins Rash     Amoxicillin Rash     Penicillins Rash       NURSING PLAN: Routed to provider Yes    RECOMMENDED DISPOSITION:  See within one week - sending work in request to PCP  Will comply with recommendation: Yes  If further questions/concerns or if symptoms do not improve, worsen or new symptoms develop, call your PCP or [facility] as soon as possible.      Guideline used: Chest pain  Telephone Triage Protocols for Nurses, Fifth Edition, Dalia Higginbotham RN

## 2018-03-21 NOTE — TELEPHONE ENCOUNTER
Patient can be worked in for her symptoms on Monday 3/26 at 2:30pm. Please contact patient to advise and confirm. Appointment made to hold time.  Angela Bowers CMA

## 2018-03-26 ENCOUNTER — OFFICE VISIT (OUTPATIENT)
Dept: FAMILY MEDICINE | Facility: CLINIC | Age: 58
End: 2018-03-26
Payer: COMMERCIAL

## 2018-03-26 VITALS
WEIGHT: 148 LBS | DIASTOLIC BLOOD PRESSURE: 72 MMHG | BODY MASS INDEX: 27.23 KG/M2 | HEIGHT: 62 IN | TEMPERATURE: 98.4 F | OXYGEN SATURATION: 100 % | SYSTOLIC BLOOD PRESSURE: 120 MMHG | HEART RATE: 73 BPM

## 2018-03-26 DIAGNOSIS — R07.89 CHEST TIGHTNESS: Primary | ICD-10-CM

## 2018-03-26 DIAGNOSIS — R20.2 PARESTHESIAS: ICD-10-CM

## 2018-03-26 DIAGNOSIS — R00.2 PALPITATIONS: ICD-10-CM

## 2018-03-26 LAB
ANION GAP SERPL CALCULATED.3IONS-SCNC: 8 MMOL/L (ref 3–14)
BUN SERPL-MCNC: 18 MG/DL (ref 7–30)
CALCIUM SERPL-MCNC: 9.4 MG/DL (ref 8.5–10.1)
CHLORIDE SERPL-SCNC: 104 MMOL/L (ref 94–109)
CO2 SERPL-SCNC: 29 MMOL/L (ref 20–32)
CREAT SERPL-MCNC: 0.76 MG/DL (ref 0.52–1.04)
ERYTHROCYTE [DISTWIDTH] IN BLOOD BY AUTOMATED COUNT: 13.3 % (ref 10–15)
GFR SERPL CREATININE-BSD FRML MDRD: 78 ML/MIN/1.7M2
GLUCOSE SERPL-MCNC: 88 MG/DL (ref 70–99)
HCT VFR BLD AUTO: 41.9 % (ref 35–47)
HGB BLD-MCNC: 13.9 G/DL (ref 11.7–15.7)
MCH RBC QN AUTO: 31.1 PG (ref 26.5–33)
MCHC RBC AUTO-ENTMCNC: 33.2 G/DL (ref 31.5–36.5)
MCV RBC AUTO: 94 FL (ref 78–100)
PLATELET # BLD AUTO: 262 10E9/L (ref 150–450)
POTASSIUM SERPL-SCNC: 3.9 MMOL/L (ref 3.4–5.3)
RBC # BLD AUTO: 4.47 10E12/L (ref 3.8–5.2)
SODIUM SERPL-SCNC: 141 MMOL/L (ref 133–144)
TSH SERPL DL<=0.005 MIU/L-ACNC: 3.89 MU/L (ref 0.4–4)
WBC # BLD AUTO: 9.1 10E9/L (ref 4–11)

## 2018-03-26 PROCEDURE — 84443 ASSAY THYROID STIM HORMONE: CPT | Performed by: FAMILY MEDICINE

## 2018-03-26 PROCEDURE — 36415 COLL VENOUS BLD VENIPUNCTURE: CPT | Performed by: FAMILY MEDICINE

## 2018-03-26 PROCEDURE — 80048 BASIC METABOLIC PNL TOTAL CA: CPT | Performed by: FAMILY MEDICINE

## 2018-03-26 PROCEDURE — 93000 ELECTROCARDIOGRAM COMPLETE: CPT | Performed by: FAMILY MEDICINE

## 2018-03-26 PROCEDURE — 85027 COMPLETE CBC AUTOMATED: CPT | Performed by: FAMILY MEDICINE

## 2018-03-26 PROCEDURE — 99214 OFFICE O/P EST MOD 30 MIN: CPT | Performed by: FAMILY MEDICINE

## 2018-03-26 ASSESSMENT — PAIN SCALES - GENERAL: PAINLEVEL: NO PAIN (0)

## 2018-03-26 NOTE — NURSING NOTE
"Chief Complaint   Patient presents with     Chest Pain     tightness, shortness of breath after a meal     Numbness     bilateral hands/finger tips       Initial /72  Pulse 73  Temp 98.4  F (36.9  C) (Temporal)  Ht 5' 2\" (1.575 m)  Wt 148 lb (67.1 kg)  LMP 11/01/2013  SpO2 100%  BMI 27.07 kg/m2 Estimated body mass index is 27.07 kg/(m^2) as calculated from the following:    Height as of this encounter: 5' 2\" (1.575 m).    Weight as of this encounter: 148 lb (67.1 kg).  Medication Reconciliation: complete   Angela Bowers CMA    "

## 2018-03-26 NOTE — PROGRESS NOTES
SUBJECTIVE:   S:  Mar yCarmen Jaime is a 57 year old female who complains of chest tightness, palpitations, and paresthesias. She contacted the clinic on 3/21, 5 days ago, complaining of intermittent chest tightness occurring about 2x weekly. She also complained of intermittent shortness of breath occurring with this chest tightness, and after eating a large meal. Her chest tightness and pain do not occur with exertion.  Patient notes palpitations when laying in bed at night, her heart feels like it's racing for a while before it resolves on its own. This occurs about 1-2x weekly. In the last month, she has experienced 2-3 episodes of bilateral finger numbness and tingling, mostly in the morning while driving to work. Last week, she noticed that her fingers were pale and white. She has a history of bilateral carpal tunnel s/p bilateral carpal tunnel release.  Her symptoms seem to improve with use. Today, she says that she continues to experience these intermittent symptoms. She is not currently experiencing chest pain. Patient denies any dizziness, nausea, vomiting, neck pain, shoulder pain, abnormal bleeding, or other associated symptoms. She mentions that she has a FHx of Hypothyroidism.       Problem list and histories reviewed & adjusted, as indicated.  Additional history: as documented    Patient Active Problem List   Diagnosis     Rosacea     FAMILY HX CONDITION NEC     CARDIOVASCULAR SCREENING; LDL GOAL LESS THAN 160     Advanced directives, counseling/discussion     Trigger finger of right thumb     Bilateral carpal tunnel syndrome     Left carpal tunnel syndrome     Past Surgical History:   Procedure Laterality Date     C APPENDECTOMY  1996     COLONOSCOPY  1/10/2011    normal repeat 10 years     LAPAROSCOPIC TUBAL LIGATION  3/9/2012    Procedure:LAPAROSCOPIC TUBAL LIGATION; Laparoscopic Tubal; Surgeon:RUPAL FREEMAN; Location:[facility]     RELEASE CARPAL TUNNEL Right 6/24/2016    Procedure: RELEASE CARPAL  TUNNEL;  Surgeon: Parmjit Peña DO;  Location: [facility]     RELEASE CARPAL TUNNEL Left 10/21/2016    Procedure: RELEASE CARPAL TUNNEL;  Surgeon: Parmjit Peña DO;  Location: [facility]     RELEASE TRIGGER FINGER Right 6/24/2016    Procedure: RELEASE TRIGGER FINGER;  Surgeon: Parmjit Peña DO;  Location: [facility]       Social History   Substance Use Topics     Smoking status: Never Smoker     Smokeless tobacco: Never Used     Alcohol use 0.0 oz/week     0 Standard drinks or equivalent per week      Comment: 0-3 drinks per week     Family History   Problem Relation Age of Onset     DIABETES Father      AOD     DIABETES Sister      JOD     CANCER Father      salivary glands     HEART DISEASE Father      1st MI in 40's     CANCER Paternal Uncle      Prostate     Genitourinary Problems Sister      kidney abnormality, hypertrophy and rupture         Current Outpatient Prescriptions   Medication Sig Dispense Refill     Specialty Vitamins Products (MENOPAUSE RELIEF PO)        order for DME Equipment being ordered: EXOS, XS, short arm 1 Device 0     MOTRIN IB PO Take 400 mg by mouth every 4 hours as needed for moderate pain       VITAMIN D, CHOLECALCIFEROL, PO Take 2,000 Units by mouth daily       Flaxseed, Linseed, (FLAX PO) Take 1 capsule by mouth daily        Probiotic Product (ACIDOPHILUS PROBIOTIC BLEND) CAPS 1 OTC tab daily       Black Cohosh (CVS BLACK COHOSH) 540 MG CAPS 1 tablet daily, uncertain strength       B Complex Vitamins (B COMPLEX PO) Take  by mouth. 1 daily       Digestive Enzymes (MULTI-ENZYME PO) Take 1 capsule by mouth daily        Cinnamon 500 MG CAPS Take  by mouth. 1 TABLET ONCE A DAY       Green Tea, Camillia sinensis, (GREEN TEA PO) Take  by mouth. 1 TABLET ONCE A DAY       GELATIN 1 ONCE A DAY       CALCIUM & MAGNESIUM CARBONATES PO Take  by mouth. WITH ZINC:1 TABLET ONCE A DAY       MULTIVITAMIN OR 1 tablet daily       Allergies   Allergen Reactions     Cephalosporins  "Rash     Amoxicillin Rash     Penicillins Rash     BP Readings from Last 3 Encounters:   03/26/18 120/72   04/15/17 160/81   04/05/17 108/64    Wt Readings from Last 3 Encounters:   03/26/18 148 lb (67.1 kg)   05/02/17 147 lb (66.7 kg)   04/15/17 145 lb (65.8 kg)         Reviewed and updated as needed this visit by clinical staff  Tobacco  Allergies  Meds  Med Hx  Surg Hx  Fam Hx  Soc Hx      Reviewed and updated as needed this visit by Provider         ROS:  7 point ROS is otherwise negative except as noted in HPI.     OBJECTIVE:     /72  Pulse 73  Temp 98.4  F (36.9  C) (Temporal)  Ht 5' 2\" (1.575 m)  Wt 148 lb (67.1 kg)  LMP 11/01/2013  SpO2 100%  BMI 27.07 kg/m2  Body mass index is 27.07 kg/(m^2).   Vitals noted.  Patient alert, oriented, and in no acute distress.  Neck:  Supple without lymphadenopathy, JVD or masses.  CV:  RRR without murmur.  Respiratory:  Lungs clear to auscultation bilaterally.    Diagnostic Test Results:  Orders Placed This Encounter   Procedures     CBC with platelets     Basic metabolic panel  (Ca, Cl, CO2, Creat, Gluc, K, Na, BUN)     TSH with free T4 reflex     EKG 12-lead complete w/read - Clinics     Exercise Stress Echocardiogram     EKG - appears normal, NSR, normal axis, normal intervals, no acute ST/T changes c/w ischemia, no LVH by voltage criteria      ASSESSMENT:       ICD-10-CM    1. Chest tightness R07.89 EKG 12-lead complete w/read - Clinics     CBC with platelets     Basic metabolic panel  (Ca, Cl, CO2, Creat, Gluc, K, Na, BUN)     TSH with free T4 reflex     Exercise Stress Echocardiogram   2. Paresthesias R20.2 EKG 12-lead complete w/read - Clinics     CBC with platelets     Basic metabolic panel  (Ca, Cl, CO2, Creat, Gluc, K, Na, BUN)     TSH with free T4 reflex   3. Palpitations R00.2 CBC with platelets     Basic metabolic panel  (Ca, Cl, CO2, Creat, Gluc, K, Na, BUN)     TSH with free T4 reflex     Exercise Stress Echocardiogram     PLAN:     EKG " collected as above, which is normal. Labs collected, will notify with results and discuss further evaluation/ treatment if needed at that time. Discussed that I think her chest tightness is worrisome, so I recommend that she have a stress test soon to evaluate this. Stress test scheduled, see orders. Discussed with the patient that if her stress test is normal, I will be less concerned about cardiac cause. Her palpitations may be caused by PSVT, which is usually benign and self limited. I may recommend that she decrease her caffeine use and discontinue her green tea supplement if her symptoms continue to bother her. I may also recommend a holter monitor to evaluate her palpitations further. Her hand paresthesias may be related to carpal tunnel if she is overdoing it again, so I recommended she start wearing her wrist braces with repetitive activity. If all of her evaluation is normal, we'll have to discuss other workup for other possible causes such as pinched nerves, stress/ anxiety, diet issues, esophageal reflux, etc. We can discuss further evaluation/ treatment for these issues if her full cardiac work up is normal. Patient is in agreement with this treatment plan. Follow up for stress test, or sooner with pain/ shortness of breath with exertion or other worsening symptoms.     This document serves as a record of services personally performed by Huong Greenwood MD. It was created on their behalf by Dulce Maria Diaz, a trained medical scribe. The creation of this record is based on the provider's personal observations and the statements of the patient. This document has been checked and approved by the attending provider.    Huong Greenwood MD  [facility]

## 2018-03-26 NOTE — MR AVS SNAPSHOT
After Visit Summary   3/26/2018    Mary Carmen Jaime    MRN: [id number]           Patient Information     Date Of Birth          1960        Visit Information        Provider Department      3/26/2018 2:30 PM Huong Greenwood MD [facility]        Today's Diagnoses     Chest tightness    -  1    Paresthesias        Palpitations           Follow-ups after your visit        Your next 10 appointments already scheduled     Mar 28, 2018  3:00 PM CDT   Ech Stress Test with PHECHR2   [facility] ([facility])    [address] [place] [address]   [phone]           1. Please bring or wear a comfortable two-piece outfit and walking shoes. 2. Stop eating 3 hours before the test. You may drink water or juice. 3. Stop all caffeine 12 hours before the test. This includes coffee, tea, soda pop, chocolate and certain medicines (such as Anacin and Excederin). Also avoid decaf coffee and tea, as these contain small amounts of caffeine. 4. No alcohol, smoking or use of other tobacco products for 12 hours before the test. 5. Refer to your provider instructions to see if you need to stop any medications (such as beta-blockers or nitrates) for this test. 6. For patients with diabetes: - If you take insulin, call your diabetes care team. Ask if you should take a   dose the morning of your test. - If you take diabetes medicine by mouth, dont take it on the morning of your test. Bring it with you to take after the test. (If you have questions, call your diabetes care team) 7. When you arrive, please tell us if: - You have diabetes. - You have taken Viagra, Cialis or Levitra in the past 48 hours. 8. For any questions that cannot be answered, please contact the ordering physician            Apr 23, 2018  2:30 PM CDT   PHYSICAL with Huong Greenwood MD   [facility]    [address]  [place]  [facility] [id number] [phone]              Future tests that were ordered for you today     Open Future Orders        Priority Expected Expires Ordered    Exercise Stress Echocardiogram Routine  3/26/2019 3/26/2018            Who to contact     If you have questions or need follow up information about today's clinic visit or your schedule please contact [facility] directly at [phone].  Normal or non-critical lab and imaging results will be communicated to you by [employer], letter or phone within 4 business days after the clinic has received the results. If you do not hear from us within 7 days, please contact the clinic through [employer] or phone. If you have a critical or abnormal lab result, we will notify you by phone as soon as possible.  Submit refill requests through [employer] or call your pharmacy and they will forward the refill request to us. Please allow 3 business days for your refill to be completed.          Additional Information About Your Visit        [employer] Information     [employer] lets you send messages to your doctor, view your test results, renew your prescriptions, schedule appointments and more. To sign up, go to [email or web address] . Click on \"Log in\" on the left side of the screen, which will take you to the Welcome page. Then click on \"Sign up Now\" on the right side of the page.     You will be asked to enter the access code listed below, as well as some personal information. Please follow the directions to create your username and password.     Your access code is: [email or web address]  Expires: 2018  4:03 PM     Your access code will  in 90 days. If you need help or a new code, please call your [facility] clinic or [phone].        Care EveryWhere ID     This is your Care EveryWhere ID. This could be used by other organizations to access your [facility] medical records  [id number]        Your Vitals Were     Pulse Temperature Height Last Period Pulse " "Oximetry BMI (Body Mass Index)    73 98.4  F (36.9  C) (Temporal) 5' 2\" (1.575 m) 11/01/2013 100% 27.07 kg/m2       Blood Pressure from Last 3 Encounters:   03/26/18 120/72   04/15/17 160/81   04/05/17 108/64    Weight from Last 3 Encounters:   03/26/18 148 lb (67.1 kg)   05/02/17 147 lb (66.7 kg)   04/15/17 145 lb (65.8 kg)              We Performed the Following     Basic metabolic panel  (Ca, Cl, CO2, Creat, Gluc, K, Na, BUN)     CBC with platelets     EKG 12-lead complete w/read - Clinics     TSH with free T4 reflex        Primary Care Provider Office Phone # Fax #    Huong Aleah Greenwood [phone] [address] [place] [address]        Equal Access to Services     TRACIE DOWNS : Hadii bruce shepherd Sofam, waaxda luqnadya, qaybta kaalmada adecarole, yariel kaur. So [facility] [phone].    ATENCIÓN: Si maria tla espleta, tiene a valadez disposición servicios gratuitos de asistencia lingüística. Llame al [phone].    We comply with applicable federal civil rights laws and [place] laws. We do not discriminate on the basis of race, color, national origin, age, disability, sex, sexual orientation, or gender identity.            Thank you!     Thank you for choosing [facility]  for your care. Our goal is always to provide you with excellent care. Hearing back from our patients is one way we can continue to improve our services. Please take a few minutes to complete the written survey that you may receive in the mail after your visit with us. Thank you!             Your Updated Medication List - Protect others around you: Learn how to safely use, store and throw away your medicines at www.disposemymeds.org.          This list is accurate as of 3/26/18  4:03 PM.  Always use your most recent med list.                   Brand Name Dispense Instructions for use Diagnosis    ACIDOPHILUS PROBIOTIC BLEND Caps      1 OTC tab daily    Encounter " for routine adult health examination with abnormal findings       B COMPLEX PO      Take  by mouth. 1 daily        Black Cohosh 540 MG Caps    CVS BLACK COHOSH     1 tablet daily, uncertain strength    Menopause       CALCIUM & MAGNESIUM CARBONATES PO      Take  by mouth. WITH ZINC:1 TABLET ONCE A DAY        cinnamon 500 MG Caps      Take  by mouth. 1 TABLET ONCE A DAY        FLAX PO      Take 1 capsule by mouth daily        GELATIN      1 ONCE A DAY        GREEN TEA PO      Take  by mouth. 1 TABLET ONCE A DAY        MENOPAUSE RELIEF PO           MOTRIN IB PO      Take 400 mg by mouth every 4 hours as needed for moderate pain        MULTI-ENZYME PO      Take 1 capsule by mouth daily        MULTIVITAMIN PO      1 tablet daily        order for DME     1 Device    Equipment being ordered: EXOS, XS, short arm    Closed nondisplaced fracture of styloid process of right radius, initial encounter       VITAMIN D (CHOLECALCIFEROL) PO      Take 2,000 Units by mouth daily

## 2018-03-28 ENCOUNTER — HOSPITAL ENCOUNTER (OUTPATIENT)
Dept: CARDIOLOGY | Facility: CLINIC | Age: 58
Discharge: HOME | End: 2018-03-28
Attending: FAMILY MEDICINE | Admitting: FAMILY MEDICINE
Payer: COMMERCIAL

## 2018-03-28 DIAGNOSIS — R00.2 PALPITATIONS: ICD-10-CM

## 2018-03-28 DIAGNOSIS — R07.89 CHEST TIGHTNESS: ICD-10-CM

## 2018-03-28 PROCEDURE — 93350 STRESS TTE ONLY: CPT | Mod: TC

## 2018-03-28 PROCEDURE — 93321 DOPPLER ECHO F-UP/LMTD STD: CPT | Mod: 26 | Performed by: INTERNAL MEDICINE

## 2018-03-28 PROCEDURE — 93325 DOPPLER ECHO COLOR FLOW MAPG: CPT | Mod: 26 | Performed by: INTERNAL MEDICINE

## 2018-03-28 PROCEDURE — 93016 CV STRESS TEST SUPVJ ONLY: CPT | Performed by: INTERNAL MEDICINE

## 2018-03-28 PROCEDURE — 93018 CV STRESS TEST I&R ONLY: CPT | Performed by: INTERNAL MEDICINE

## 2018-03-28 PROCEDURE — 93017 CV STRESS TEST TRACING ONLY: CPT | Performed by: REHABILITATION PRACTITIONER

## 2018-03-28 PROCEDURE — 93350 STRESS TTE ONLY: CPT | Mod: 26 | Performed by: INTERNAL MEDICINE

## 2018-05-02 ENCOUNTER — OFFICE VISIT (OUTPATIENT)
Dept: FAMILY MEDICINE | Facility: CLINIC | Age: 58
End: 2018-05-02
Payer: COMMERCIAL

## 2018-05-02 VITALS
TEMPERATURE: 97.9 F | OXYGEN SATURATION: 100 % | WEIGHT: 146.6 LBS | HEART RATE: 67 BPM | BODY MASS INDEX: 26.81 KG/M2 | SYSTOLIC BLOOD PRESSURE: 118 MMHG | DIASTOLIC BLOOD PRESSURE: 68 MMHG

## 2018-05-02 DIAGNOSIS — L57.0 ACTINIC KERATOSIS: ICD-10-CM

## 2018-05-02 DIAGNOSIS — R55 MICTURITION SYNCOPE: ICD-10-CM

## 2018-05-02 DIAGNOSIS — Z00.00 ENCOUNTER FOR ROUTINE ADULT HEALTH EXAMINATION WITHOUT ABNORMAL FINDINGS: Primary | ICD-10-CM

## 2018-05-02 DIAGNOSIS — Z23 ENCOUNTER FOR IMMUNIZATION: ICD-10-CM

## 2018-05-02 PROCEDURE — 90750 HZV VACC RECOMBINANT IM: CPT | Performed by: FAMILY MEDICINE

## 2018-05-02 PROCEDURE — 99396 PREV VISIT EST AGE 40-64: CPT | Mod: 25 | Performed by: FAMILY MEDICINE

## 2018-05-02 PROCEDURE — 90471 IMMUNIZATION ADMIN: CPT | Performed by: FAMILY MEDICINE

## 2018-05-02 PROCEDURE — 99213 OFFICE O/P EST LOW 20 MIN: CPT | Mod: 25 | Performed by: FAMILY MEDICINE

## 2018-05-02 RX ORDER — MV-MN/OM3/DHA/EPA/FISH/LUT/ZEA 250-5-1 MG
CAPSULE ORAL
COMMUNITY
Start: 2018-05-02

## 2018-05-02 ASSESSMENT — PAIN SCALES - GENERAL: PAINLEVEL: MILD PAIN (3)

## 2018-05-02 NOTE — PROGRESS NOTES
SUBJECTIVE:   CC: Mary Carmen Jaime is an 57 year old woman who presents for preventive health visit.     Physical   Annual:     Getting at least 3 servings of Calcium per day::  NO    Bi-annual eye exam::  Yes    Dental care twice a year::  Yes    Sleep apnea or symptoms of sleep apnea::  None    Diet::  Regular (no restrictions)    Frequency of exercise::  2-3 days/week    Duration of exercise::  15-30 minutes    Taking medications regularly::  Yes    Medication side effects::  Not applicable    Additional concerns today::  YES (near syncope, pt fall when getting up in the middle of the night)              Today's PHQ-2 Score:   PHQ-2 ( 1999 Pfizer) 5/2/2018   Q1: Little interest or pleasure in doing things 0   Q2: Feeling down, depressed or hopeless 0   PHQ-2 Score 0   Q1: Little interest or pleasure in doing things Not at all   Q2: Feeling down, depressed or hopeless Not at all   PHQ-2 Score 0     Answers for HPI/ROS submitted by the patient on 5/2/2018   PHQ-2 Score: 0    Abuse: Current or Past(Physical, Sexual or Emotional)- No  Do you feel safe in your environment - Yes    Social History   Substance Use Topics     Smoking status: Never Smoker     Smokeless tobacco: Never Used     Alcohol use 0.0 oz/week     0 Standard drinks or equivalent per week      Comment: 0-3 drinks per week     Alcohol Use 5/2/2018   If you drink alcohol do you typically have greater than 3 drinks per day OR greater than 7 drinks per week? Yes   AUDIT SCORE  5     AUDIT - Alcohol Use Disorders Identification Test - Reproduced from the World Health Organization Audit 2001 (Second Edition) 5/2/2018   1.  How often do you have a drink containing alcohol? 2 to 3 times a week   2.  How many drinks containing alcohol do you have on a typical day when you are drinking? 3 or 4   3.  How often do you have five or more drinks on one occasion? Less than monthly   4.  How often during the last year have you found that you were not able to stop  drinking once you had started? Never   5.  How often during the last year have you failed to do what was normally expected of you because of drinking? Never   6.  How often during the last year have you needed a first drink in the morning to get yourself going after a heavy drinking session? Never   7.  How often during the last year have you had a feeling of guilt or remorse after drinking? Never   8.  How often during the last year have you been unable to remember what happened the night before because of your drinking? Never   9.  Have you or someone else been injured because of your drinking? No   10. Has a relative, friend, doctor or other health care worker been concerned about your drinking or suggested you cut down? No   TOTAL SCORE 5       Reviewed orders with patient.  Reviewed health maintenance and updated orders accordingly - Yes      Patient over age 50, mutual decision to screen reflected in health maintenance.    Pertinent mammograms are reviewed under the imaging tab.  History of abnormal Pap smear: NO - age 30- 65 PAP every 3 years recommended    Reviewed and updated as needed this visit by clinical staff  Tobacco  Allergies  Meds  Med Hx  Surg Hx  Fam Hx  Soc Hx        Reviewed and updated as needed this visit by Provider            Review of Systems  CONSTITUTIONAL: NEGATIVE for fever, chills, change in weight  INTEGUMENTARY/SKIN: positive for a red spot on her neck. It was frozen in the past couple years but hasn't gone away.  Looking through my notes, I recommended cortisone cream trial 5 years ago but can't find record of when this was frozen.  She thinks 2 years ago.   EYES: NEGATIVE for vision changes or irritation  ENT: NEGATIVE for ear, mouth and throat problems  RESP: NEGATIVE for significant cough or SOB  BREAST: NEGATIVE for masses, tenderness or discharge  CV: NEGATIVE for chest pain, palpitations or peripheral edema  GI: NEGATIVE for nausea, abdominal pain, heartburn, or change  in bowel habits  : NEGATIVE for unusual urinary or vaginal symptoms. No vaginal bleeding.  MUSCULOSKELETAL: NEGATIVE for significant arthralgias or myalgia  NEURO: NEGATIVE for weakness. Patient states she had a fainting episode last night after using the restroom. She got up to empty her bladder, and then when she was washing up she just fainted. Her  heard her fall and came in and found her laying on the floor by the tub, awake/groggy. She remembers incident, no LOC, hit the right side of her back on the tub, no major injuries.  Not sure if she fainted or had a stroke, or what.  No tongue biting, no loss of bladder (but had just emptied it).  No prior episodes.  Denies other neuro symptoms such as incoordination, weakness, tremor, tingling, loss of sensation or motor anywhere.   PSYCHIATRIC: NEGATIVE for changes in mood or affect      OBJECTIVE:   /68  Pulse 67  Temp 97.9  F (36.6  C) (Temporal)  Wt 66.5 kg (146 lb 9.6 oz)  LMP 11/01/2013  SpO2 100%  BMI 26.81 kg/m2  Physical Exam  GENERAL APPEARANCE: healthy, alert and no distress  EYES: Eyes grossly normal to inspection, PERRL and conjunctivae and sclerae normal  HENT: atraumatic, ear canals and TM's normal, nose and mouth without ulcers or lesions, oropharynx clear and oral mucous membranes moist  NECK: no adenopathy, no asymmetry, masses, or scars and thyroid normal to palpation, no bruits.   RESP: lungs clear to auscultation - no rales, rhonchi or wheezes  BREAST: normal without masses, tenderness or nipple discharge and no palpable axillary masses or adenopathy  CV: regular rate and rhythm, normal S1 S2, no S3 or S4, no murmur, click or rub, no peripheral edema and peripheral pulses strong  ABDOMEN: soft, nontender, no hepatosplenomegaly, no masses and bowel sounds normal  MS: no musculoskeletal defects are noted and gait is age appropriate without ataxia  SKIN: mostly clear, but she has a red scaly rash, irregularly shaped,  approximately 2.5 cm in the suprasternal notch.  NEURO: Normal strength and tone, sensory exam grossly normal, mentation intact and speech normal, gait normal.   PSYCH: mentation appears normal and affect normal/bright    ASSESSMENT/PLAN:       ICD-10-CM    1. Encounter for routine adult health examination without abnormal findings Z00.00 Multiple Vitamins-Minerals (OCUVITE ADULT 50+) CAPS   2. Micturition syncope R55    3. Encounter for immunization Z23 ZOSTER VACCINE RECOMBINANT ADJUVANTED IM NJX (SHINGRIX)     ADMIN 1st VACCINE   4. Actinic keratosis L57.0      - Pap normal on 4/5/2017, repeat in 2 years  - Mammo was negative 2/28/2018, recommend repeat yearly  - Colonoscopy was normal on 1/11/2011, repeat 10 years  - Dexa osteopenia with increased fracture risks 4/14/2017, recommend repeat next year, and recommend weight bearing exercise, adequate calcium and vitamin D intake  - I recommend a punch biopsy of the spot on her neck in the next few weeks, appt made.  May need derm referral for excision on that due to size.     - Hep C screening test was declined by patient   - Vaccinations: Shingrix given  - Follow up for routine physical in 1 year, or sooner if needed.     We discussed the fainting episode she had last night. It sounds like it could be due to micturition syncope. Since she has not had any other neurologic deficits and has fully recovered from this event, will not pursue workup at this time, but discussed caution to prevent recurrence. Also discussed neurologic symptoms to watch for that would warrant CT scan or other neuro or cardiovascular workup, and she will return with any other symptoms.     COUNSELING:  Reviewed preventive health counseling, as reflected in patient instructions       Regular exercise       Healthy diet/nutrition       Vision screening       Hearing screening       reports that she has never smoked. She has never used smokeless tobacco.    Estimated body mass index is 26.81  "kg/(m^2) as calculated from the following:    Height as of 3/26/18: 1.575 m (5' 2\").    Weight as of this encounter: 66.5 kg (146 lb 9.6 oz).       Counseling Resources:  ATP IV Guidelines  Pooled Cohorts Equation Calculator  Breast Cancer Risk Calculator  FRAX Risk Assessment  ICSI Preventive Guidelines  Dietary Guidelines for Americans, 2010  USDA's MyPlate  ASA Prophylaxis  Lung CA Screening    Huong Greenwood MD  [facility]  "

## 2018-05-02 NOTE — MR AVS SNAPSHOT
After Visit Summary   5/2/2018    Mary Carmen Jaime    MRN: [id number]           Patient Information     Date Of Birth          1960        Visit Information        Provider Department      5/2/2018 4:00 PM Huong Greenwood MD [facility]        Today's Diagnoses     Encounter for routine adult health examination without abnormal findings    -  1    Micturition syncope        Encounter for immunization        Actinic keratosis          Care Instructions      Preventive Health Recommendations  Female Ages 50 - 64    Yearly exam: See your health care provider every year in order to  o Review health changes.   o Discuss preventive care.    o Review your medicines if your doctor has prescribed any.      Get a Pap test every three years (unless you have an abnormal result and your provider advises testing more often).    If you get Pap tests with HPV test, you only need to test every 5 years, unless you have an abnormal result.     You do not need a Pap test if your uterus was removed (hysterectomy) and you have not had cancer.    You should be tested each year for STDs (sexually transmitted diseases) if you're at risk.     Have a mammogram every 1 to 2 years.    Have a colonoscopy at age 50, or have a yearly FIT test (stool test). These exams screen for colon cancer.      Have a cholesterol test every 5 years, or more often if advised.    Have a diabetes test (fasting glucose) every three years. If you are at risk for diabetes, you should have this test more often.     If you are at risk for osteoporosis (brittle bone disease), think about having a bone density scan (DEXA).    Shots: Get a flu shot each year. Get a tetanus shot every 10 years.    Nutrition:     Eat at least 5 servings of fruits and vegetables each day.    Eat whole-grain bread, whole-wheat pasta and brown rice instead of white grains and rice.    Talk to your provider about Calcium and Vitamin D.  "    Lifestyle    Exercise at least 150 minutes a week (30 minutes a day, 5 days a week). This will help you control your weight and prevent disease.    Limit alcohol to one drink per day.    No smoking.     Wear sunscreen to prevent skin cancer.     See your dentist every six months for an exam and cleaning.    See your eye doctor every 1 to 2 years.            Follow-ups after your visit        Your next 10 appointments already scheduled     Jun 01, 2018 12:30 PM CDT   PROCEDURE with Huong Greenwood MD   [facility]    [address] [place] [address] [phone]              Who to contact     If you have questions or need follow up information about today's clinic visit or your schedule please contact [facility] directly at [phone].  Normal or non-critical lab and imaging results will be communicated to you by MyChart, letter or phone within 4 business days after the clinic has received the results. If you do not hear from us within 7 days, please contact the clinic through MyChart or phone. If you have a critical or abnormal lab result, we will notify you by phone as soon as possible.  Submit refill requests through [employer] or call your pharmacy and they will forward the refill request to us. Please allow 3 business days for your refill to be completed.          Additional Information About Your Visit        [employer] Information     [employer] lets you send messages to your doctor, view your test results, renew your prescriptions, schedule appointments and more. To sign up, go to [email or web address] . Click on \"Log in\" on the left side of the screen, which will take you to the Welcome page. Then click on \"Sign up Now\" on the right side of the page.     You will be asked to enter the access code listed below, as well as some personal information. Please follow the directions to create your username and password.     Your " access code is: [email or web address]  Expires: 2018  4:03 PM     Your access code will  in 90 days. If you need help or a new code, please call your [place] clinic or [phone].        Care EveryWhere ID     This is your Care EveryWhere ID. This could be used by other organizations to access your [place] medical records  [id number]        Your Vitals Were     Pulse Temperature Last Period Pulse Oximetry BMI (Body Mass Index)       67 97.9  F (36.6  C) (Temporal) 2013 100% 26.81 kg/m2        Blood Pressure from Last 3 Encounters:   18 118/68   18 120/72   04/15/17 160/81    Weight from Last 3 Encounters:   18 146 lb 9.6 oz (66.5 kg)   18 148 lb (67.1 kg)   17 147 lb (66.7 kg)              We Performed the Following     ADMIN 1st VACCINE     ZOSTER VACCINE RECOMBINANT ADJUVANTED IM NJX (SHINGRIX)          Today's Medication Changes          These changes are accurate as of 18  5:08 PM.  If you have any questions, ask your nurse or doctor.               These medicines have changed or have updated prescriptions.        Dose/Directions    * MULTIVITAMIN PO   This may have changed:  Another medication with the same name was added. Make sure you understand how and when to take each.   Changed by:  Huong Greenwood MD        1 tablet daily   Refills:  0       * OCUVITE ADULT 50+ Caps   This may have changed:  You were already taking a medication with the same name, and this prescription was added. Make sure you understand how and when to take each.   Used for:  Encounter for routine adult health examination without abnormal findings   Changed by:  Huong Greenwood MD        Take 1 capsule daily   Refills:  0       * Notice:  This list has 2 medication(s) that are the same as other medications prescribed for you. Read the directions carefully, and ask your doctor or other care provider to review them with you.         Where to get your medicines       Some of these will need a paper prescription and others can be bought over the counter.  Ask your nurse if you have questions.     You don't need a prescription for these medications     OCUVITE ADULT 50+ Caps                Primary Care Provider Office Phone # Fax #    Huong Aleah Greenwood [phone] [address] [place] [address]        Equal Access to Services     TRACIE DOWNS : Hadii aad ku hadasho Soomaali, waaxda luqadaha, qaybta kaalmada adeegyada, waxay idiin hayaan adeeg kharash la'aan ah. So [facility] [phone].    ATENCIÓN: Si habla español, tiene a valadez disposición servicios gratuitos de asistencia lingüística. Mahendra al [phone].    We comply with applicable federal civil rights laws and [place] laws. We do not discriminate on the basis of race, color, national origin, age, disability, sex, sexual orientation, or gender identity.            Thank you!     Thank you for choosing [facility]  for your care. Our goal is always to provide you with excellent care. Hearing back from our patients is one way we can continue to improve our services. Please take a few minutes to complete the written survey that you may receive in the mail after your visit with us. Thank you!             Your Updated Medication List - Protect others around you: Learn how to safely use, store and throw away your medicines at www.disposemymeds.org.          This list is accurate as of 5/2/18  5:08 PM.  Always use your most recent med list.                   Brand Name Dispense Instructions for use Diagnosis    ACIDOPHILUS PROBIOTIC BLEND Caps      1 OTC tab daily    Encounter for routine adult health examination with abnormal findings       B COMPLEX PO      Take  by mouth. 1 daily        Black Cohosh 540 MG Caps    [facility] BLACK COHOSH     1 tablet daily, uncertain strength    Menopause       CALCIUM & MAGNESIUM CARBONATES PO      Take  by mouth. WITH ZINC:1 TABLET ONCE A DAY         cinnamon 500 MG Caps      Take  by mouth. 1 TABLET ONCE A DAY        FLAX PO      Take 1 capsule by mouth daily        GELATIN      1 ONCE A DAY        GREEN TEA PO      Take  by mouth. 1 TABLET ONCE A DAY        MENOPAUSE RELIEF PO           MOTRIN IB PO      Take 400 mg by mouth every 4 hours as needed for moderate pain        MULTI-ENZYME PO      Take 1 capsule by mouth daily        * MULTIVITAMIN PO      1 tablet daily        * OCUVITE ADULT 50+ Caps      Take 1 capsule daily    Encounter for routine adult health examination without abnormal findings       order for DME     1 Device    Equipment being ordered: EXOS, XS, short arm    Closed nondisplaced fracture of styloid process of right radius, initial encounter       VITAMIN D (CHOLECALCIFEROL) PO      Take 2,000 Units by mouth daily        * Notice:  This list has 2 medication(s) that are the same as other medications prescribed for you. Read the directions carefully, and ask your doctor or other care provider to review them with you.

## 2018-05-02 NOTE — NURSING NOTE
Screening Questionnaire for Adult Immunization    Are you sick today?   No   Do you have allergies to medications, food, a vaccine component or latex?   Yes   Have you ever had a serious reaction after receiving a vaccination?   No   Do you have a long-term health problem with heart disease, lung disease, asthma, kidney disease, metabolic disease (e.g. diabetes), anemia, or other blood disorder?   No   Do you have cancer, leukemia, HIV/AIDS, or any other immune system problem?   No   In the past 3 months, have you taken medications that affect  your immune system, such as prednisone, other steroids, or anticancer drugs; drugs for the treatment of rheumatoid arthritis, Crohn s disease, or psoriasis; or have you had radiation treatments?   No   Have you had a seizure, or a brain or other nervous system problem?   No   During the past year, have you received a transfusion of blood or blood     products, or been given immune (gamma) globulin or antiviral drug?   No   For women: Are you pregnant or is there a chance you could become        pregnant during the next month?   No   Have you received any vaccinations in the past 4 weeks?   No     Immunization questionnaire was positive for at least one answer.  Notified provider.        Per orders of Dr. Greenwood, injection of immunization given by Angela Bowers. Patient instructed to remain in clinic for 15 minutes afterwards, and to report any adverse reaction to me immediately.       Screening performed by Angela Bowers on 5/2/2018 at 5:08 PM.

## 2018-05-02 NOTE — NURSING NOTE
"Chief Complaint   Patient presents with     Physical       Initial /68  Pulse 67  Temp 97.9  F (36.6  C) (Temporal)  Wt 146 lb 9.6 oz (66.5 kg)  LMP 11/01/2013  SpO2 100%  BMI 26.81 kg/m2 Estimated body mass index is 26.81 kg/(m^2) as calculated from the following:    Height as of 3/26/18: 5' 2\" (1.575 m).    Weight as of this encounter: 146 lb 9.6 oz (66.5 kg).  Medication Reconciliation: complete   Angela Bowers CMA    "

## 2018-06-12 ENCOUNTER — OFFICE VISIT (OUTPATIENT)
Dept: FAMILY MEDICINE | Facility: CLINIC | Age: 58
End: 2018-06-12
Payer: COMMERCIAL

## 2018-06-12 VITALS
WEIGHT: 148 LBS | DIASTOLIC BLOOD PRESSURE: 60 MMHG | TEMPERATURE: 98.2 F | BODY MASS INDEX: 27.07 KG/M2 | SYSTOLIC BLOOD PRESSURE: 120 MMHG | HEART RATE: 73 BPM | OXYGEN SATURATION: 98 %

## 2018-06-12 DIAGNOSIS — L81.9 ATYPICAL PIGMENTED SKIN LESION: Primary | ICD-10-CM

## 2018-06-12 PROCEDURE — 11100 HC BIOPSY SKIN/SUBQ/MUC MEM, SINGLE LESION: CPT | Performed by: FAMILY MEDICINE

## 2018-06-12 ASSESSMENT — PAIN SCALES - GENERAL: PAINLEVEL: NO PAIN (0)

## 2018-06-12 NOTE — MR AVS SNAPSHOT
After Visit Summary   6/12/2018    Mary Carmen Jaime    MRN: [id number]           Patient Information     Date Of Birth          1960        Visit Information        Provider Department      6/12/2018 3:30 PM Huong Greenwood MD [facility]         Follow-ups after your visit        Your next 10 appointments already scheduled     Jun 18, 2018  4:00 PM CDT   Nurse Only with PH NURSE   [facility] ([facility])    [address] [place] [address] [id number] [phone]              Who to contact     If you have questions or need follow up information about today's clinic visit or your schedule please contact [facility] directly at [phone].  Normal or non-critical lab and imaging results will be communicated to you by MyChart, letter or phone within 4 business days after the clinic has received the results. If you do not hear from us within 7 days, please contact the clinic through MyChart or phone. If you have a critical or abnormal lab result, we will notify you by phone as soon as possible.  Submit refill requests through [employer] or call your pharmacy and they will forward the refill request to us. Please allow 3 business days for your refill to be completed.          Additional Information About Your Visit        Care EveryWhere ID     This is your Care EveryWhere ID. This could be used by other organizations to access your [place] medical records  [id number]        Your Vitals Were     Pulse Temperature Last Period Pulse Oximetry BMI (Body Mass Index)       73 98.2  F (36.8  C) (Temporal) 11/01/2013 98% 27.07 kg/m2        Blood Pressure from Last 3 Encounters:   06/12/18 120/60   05/02/18 118/68   03/26/18 120/72    Weight from Last 3 Encounters:   06/12/18 148 lb (67.1 kg)   05/02/18 146 lb 9.6 oz (66.5 kg)   03/26/18 148 lb (67.1 kg)              Today, you had the following     No orders found for display        Primary Care Provider Office Phone # Fax #    Huong Aleah Greenwood [phone] [address] [place] [address]        Equal Access to Services     TRACIE DOWNS : Hadrenato bruce deng elsao Sosevenali, waaxda luqadaha, qaybta kaalmada sharon, yariel messer laJasemax kaur. So [facility] [phone].    ATENCIÓN: Si habla español, tiene a valadez disposición servicios gratuitos de asistencia lingüística. Llame al [phone].    We comply with applicable federal civil rights laws and [place] laws. We do not discriminate on the basis of race, color, national origin, age, disability, sex, sexual orientation, or gender identity.            Thank you!     Thank you for choosing [facility]  for your care. Our goal is always to provide you with excellent care. Hearing back from our patients is one way we can continue to improve our services. Please take a few minutes to complete the written survey that you may receive in the mail after your visit with us. Thank you!             Your Updated Medication List - Protect others around you: Learn how to safely use, store and throw away your medicines at www.disposemymeds.org.          This list is accurate as of 6/12/18  4:01 PM.  Always use your most recent med list.                   Brand Name Dispense Instructions for use Diagnosis    ACIDOPHILUS PROBIOTIC BLEND Caps      1 OTC tab daily    Encounter for routine adult health examination with abnormal findings       B COMPLEX PO      Take  by mouth. 1 daily        Black Cohosh 540 MG Caps    [facility] BLACK COHOSH     1 tablet daily, uncertain strength    Menopause       CALCIUM & MAGNESIUM CARBONATES PO      Take  by mouth. WITH ZINC:1 TABLET ONCE A DAY        cinnamon 500 MG Caps      Take  by mouth. 1 TABLET ONCE A DAY        FLAX PO      Take 1 capsule by mouth daily        GELATIN      1 ONCE A DAY        GREEN TEA PO      Take  by mouth. 1 TABLET ONCE A DAY         MENOPAUSE RELIEF PO           MOTRIN IB PO      Take 400 mg by mouth every 4 hours as needed for moderate pain        MULTI-ENZYME PO      Take 1 capsule by mouth daily        * MULTIVITAMIN PO      1 tablet daily        * OCUVITE ADULT 50+ Caps      Take 1 capsule daily    Encounter for routine adult health examination without abnormal findings       order for DME     1 Device    Equipment being ordered: EXOS, XS, short arm    Closed nondisplaced fracture of styloid process of right radius, initial encounter       VITAMIN D (CHOLECALCIFEROL) PO      Take 2,000 Units by mouth daily        * Notice:  This list has 2 medication(s) that are the same as other medications prescribed for you. Read the directions carefully, and ask your doctor or other care provider to review them with you.

## 2018-06-12 NOTE — PROGRESS NOTES
SUBJECTIVE:  Mary Carmen Jaime is a 57 year old female who presents for lesion removal. This lesion has been present for over a year.  She has a rash on her neck that we have tried treating with topical agent to no avail. I recommended punch biopsy before deciding on further treatment.      OBJECTIVE:  Vitals noted.  Patient alert, oriented, and in no acute distress. On the anterior neck, just at the suprasternal notch, she has a quarter sized irregularly shaped patch of red, raised, bumpy skin.      ASSESSMENT:    ICD-10-CM    1. Atypical pigmented skin lesion L81.9 Dermatological path order and indications     BIOPSY SKIN/SUBQ/MUC MEM, SINGLE LESION        PLAN:  After informed consent was obtained, using alcohol for cleansing followed by 1% Lidocaine with epinephrine for anesthetic and then Betadine x 3 for sterile prep, with sterile technique punch biopsy with a 3 mm punch was taken.  Used 1 simple suture of 4-0 Ethilon with excellent approximation and hemostasis.  Steri strips were applied. Sterile dressing is applied, and wound care instructions provided.  Be alert for any signs of cutaneous infection.  The specimen is labeled and sent to pathology for evaluation.  The procedure was well tolerated without complications.    Return for suture removal in 5-7 days or sooner if concerns.    Huong Greenwood MD

## 2018-06-16 LAB — COPATH REPORT: NORMAL

## 2018-06-18 ENCOUNTER — ALLIED HEALTH/NURSE VISIT (OUTPATIENT)
Dept: FAMILY MEDICINE | Facility: CLINIC | Age: 58
End: 2018-06-18
Payer: COMMERCIAL

## 2018-06-18 DIAGNOSIS — C44.41 BASAL CELL CARCINOMA OF SKIN OF NECK: Primary | ICD-10-CM

## 2018-06-18 PROCEDURE — 99207 ZZC NO CHARGE NURSE ONLY: CPT

## 2018-06-18 NOTE — MR AVS SNAPSHOT
After Visit Summary   6/18/2018    Mary Carmen Jaime    MRN: [id number]           Patient Information     Date Of Birth          1960        Visit Information        Provider Department      6/18/2018 4:00 PM PH NURSE [facility]        Today's Diagnoses     Basal cell carcinoma of skin of neck    -  1       Follow-ups after your visit        Additional Services     DERMATOLOGY REFERRAL       Your provider has referred you to: [facility]: [facility] [phone]   [email or web address]    Please be aware that coverage of these services is subject to the terms and limitations of your health insurance plan.  Call member services at your health plan with any benefit or coverage questions.      Please bring the following with you to your appointment:    (1) Any X-Rays, CTs or MRIs which have been performed.  Contact the facility where they were done to arrange for  prior to your scheduled appointment.    (2) List of current medications  (3) This referral request   (4) Any documents/labs given to you for this referral                  Who to contact     If you have questions or need follow up information about today's clinic visit or your schedule please contact [facility] directly at [phone].  Normal or non-critical lab and imaging results will be communicated to you by MyChart, letter or phone within 4 business days after the clinic has received the results. If you do not hear from us within 7 days, please contact the clinic through MyChart or phone. If you have a critical or abnormal lab result, we will notify you by phone as soon as possible.  Submit refill requests through [employer] or call your pharmacy and they will forward the refill request to us. Please allow 3 business days for your refill to be completed.          Additional Information About Your Visit        Care EveryWhere ID      This is your Care EveryWhere ID. This could be used by other organizations to access your [place] medical records  [id number]        Your Vitals Were     Last Period                   11/01/2013            Blood Pressure from Last 3 Encounters:   06/12/18 120/60   05/02/18 118/68   03/26/18 120/72    Weight from Last 3 Encounters:   06/12/18 148 lb (67.1 kg)   05/02/18 146 lb 9.6 oz (66.5 kg)   03/26/18 148 lb (67.1 kg)              We Performed the Following     DERMATOLOGY REFERRAL        Primary Care Provider Office Phone # Fax #    Huong Aleah Greenwood [phone] [address] [place] [address]        Equal Access to Services     TRACIE DOWNS : Vero huntero Soomaali, waaxda luqadaha, qaybta kaalmada adeegyada, yariel kaur. So [facility] [phone].    ATENCIÓN: Si habla español, tiene a valadez disposición servicios gratuitos de asistencia lingüística. Llame al [phone].    We comply with applicable federal civil rights laws and [place] laws. We do not discriminate on the basis of race, color, national origin, age, disability, sex, sexual orientation, or gender identity.            Thank you!     Thank you for choosing [facility]  for your care. Our goal is always to provide you with excellent care. Hearing back from our patients is one way we can continue to improve our services. Please take a few minutes to complete the written survey that you may receive in the mail after your visit with us. Thank you!             Your Updated Medication List - Protect others around you: Learn how to safely use, store and throw away your medicines at www.disposemymeds.org.          This list is accurate as of 6/18/18  4:31 PM.  Always use your most recent med list.                   Brand Name Dispense Instructions for use Diagnosis    ACIDOPHILUS PROBIOTIC BLEND Caps      1 OTC tab daily    Encounter for routine adult health  examination with abnormal findings       B COMPLEX PO      Take  by mouth. 1 daily        Black Cohosh 540 MG Caps    [facility] BLACK COHOSH     1 tablet daily, uncertain strength    Menopause       CALCIUM & MAGNESIUM CARBONATES PO      Take  by mouth. WITH ZINC:1 TABLET ONCE A DAY        cinnamon 500 MG Caps      Take  by mouth. 1 TABLET ONCE A DAY        FLAX PO      Take 1 capsule by mouth daily        GELATIN      1 ONCE A DAY        GREEN TEA PO      Take  by mouth. 1 TABLET ONCE A DAY        MENOPAUSE RELIEF PO           MOTRIN IB PO      Take 400 mg by mouth every 4 hours as needed for moderate pain        MULTI-ENZYME PO      Take 1 capsule by mouth daily        * MULTIVITAMIN PO      1 tablet daily        * OCUVITE ADULT 50+ Caps      Take 1 capsule daily    Encounter for routine adult health examination without abnormal findings       order for DME     1 Device    Equipment being ordered: EXOS, XS, short arm    Closed nondisplaced fracture of styloid process of right radius, initial encounter       VITAMIN D (CHOLECALCIFEROL) PO      Take 2,000 Units by mouth daily        * Notice:  This list has 2 medication(s) that are the same as other medications prescribed for you. Read the directions carefully, and ask your doctor or other care provider to review them with you.

## 2018-06-19 ENCOUNTER — TELEPHONE (OUTPATIENT)
Dept: DERMATOLOGY | Facility: CLINIC | Age: 58
End: 2018-06-19

## 2018-06-19 NOTE — TELEPHONE ENCOUNTER
[facility] Call Center    Phone Message    May a detailed message be left on voicemail: yes    Reason for Call: Question regarding specialist protocol  Please follow protocols- only utilize this documentation for questions or concerns that are not clear in the protocol.  Contact clinic directly to clarify question(s) via phone or [employer] message.   Was Clinic Available: no  Question regarding protocol:  Basal cell carcinoma of skin of neck  Is there a referral for the requested specialist/specialty? yes  Name of referring provider: Huong Greenwood MD  Location of referring provider: [facility]      Action Taken: Message routed to:  Adult Clinics: Dermatology p [id number]

## 2018-06-19 NOTE — TELEPHONE ENCOUNTER
Pt called to notify that a message will be sent to  to review pt's pathology and advise on if a consult is needed and what treatment plan is recommended. Thank you Dr. Tyson ..Clementine Klnie RN        Result Notes   Notes Recorded by Huong Jones MD on 6/18/2018 at 6:08 PM  Patient notified and referred to dermatology.   Huong Jones MD            Patient Name: NASIMA ELIZABETH   MR#: [id number]   Specimen #: [id number]   Collected: 6/12/2018   Received: 6/13/2018   Reported: 6/16/2018 15:17   Ordering Phy(s): HUONG JONES     For improved result formatting, select 'View Enhanced Report Format' under    Linked Documents section.     SPECIMEN(S):   Skin, neck     FINAL DIAGNOSIS:   Skin, neck:   - Basal cell carcinoma, superficial and nodular types, extending to the   lateral and deep margins - (see   description)

## 2018-06-19 NOTE — LETTER
Ms.Maxine NEHEMIAS Jaime  [address]  [place] [address]        June 20, 2018    Dear alma,    We are sending this letter as a reminder of your upcoming appointment on 6/28/18 with  at 3:30pm. When you arrive go up to the second floor and check in at desk D. If you need to cancel or reschedule this appointment please call the clinic back at [phone]. We look forward to seeing you soon.      [facility]  [address] [place]   [address]      [place]

## 2018-06-20 NOTE — TELEPHONE ENCOUNTER
Pt called back and notified of 's message below. Pt verbalized understanding and scheduled on 6/28/18 at 3:30pm. Appt ok'd by Sindi BASS. RN offered to schedule a tentative [facility] appt for pt and pt agreed with this plan. RN offered appt on 7/5/18 but pt declined as pt will be out of town. Next available [facility] appt is on 7/19/18.  Pt scheduled tentatively for Mohs on 7/19/18. Letter sent to pt with appt date and time and address. Pt advised to call back with any questions or concerns..Clementine Kline RN

## 2018-06-20 NOTE — TELEPHONE ENCOUNTER
RN received the following message below from Dr. Tyson. Pt called, no answer. General message left for pt to call the [facility] back at [phone]..Clementine [place],   Since she's not been seen before for Mohs, I'd prefer a consult. If you can let her know, BCC is slow growing and usually not dangerous as long as we get to it appropriately. It's unlikely to change in cure rate or the size of the scar as long as we treat it within 8 weeks. Thank you.   AM

## 2018-06-21 NOTE — PROGRESS NOTES
Patient's single suture was removed. Incision showed no signs of infection and was well adhered.  Dr. Greenwood came in to give the patient her biopsy results.  Referral for dermatology was sign per verbal from Dr. POND.  Patient was given AVS with phone numbers to contact for scheduling derm appt.     Stacy Tang RN. . .  6/21/2018, 8:14 AM

## 2018-06-28 ENCOUNTER — OFFICE VISIT (OUTPATIENT)
Dept: DERMATOLOGY | Facility: CLINIC | Age: 58
End: 2018-06-28
Payer: COMMERCIAL

## 2018-06-28 DIAGNOSIS — L81.4 SOLAR LENTIGO: ICD-10-CM

## 2018-06-28 DIAGNOSIS — C44.41 BASAL CELL CARCINOMA OF NECK: ICD-10-CM

## 2018-06-28 DIAGNOSIS — D22.9 MULTIPLE BENIGN NEVI: ICD-10-CM

## 2018-06-28 DIAGNOSIS — D48.5 NEOPLASM OF UNCERTAIN BEHAVIOR OF SKIN: Primary | ICD-10-CM

## 2018-06-28 PROCEDURE — 11100 HC BIOPSY SKIN/SUBQ/MUC MEM, SINGLE LESION: CPT | Mod: GC | Performed by: DERMATOLOGY

## 2018-06-28 PROCEDURE — 88305 TISSUE EXAM BY PATHOLOGIST: CPT | Mod: TC | Performed by: DERMATOLOGY

## 2018-06-28 PROCEDURE — 99203 OFFICE O/P NEW LOW 30 MIN: CPT | Mod: 25 | Performed by: DERMATOLOGY

## 2018-06-28 ASSESSMENT — PAIN SCALES - GENERAL: PAINLEVEL: NO PAIN (0)

## 2018-06-28 NOTE — NURSING NOTE
Mary Carmen Jaime's goals for this visit include: consult MOHS neck BCC  She requests these members of her care team be copied on today's visit information:     PCP: Huong Greenwood    Referring Provider:  No referring provider defined for this encounter.    LMP 11/01/2013    Do you need any medication refills at today's visit? No      Sindi Redding LPN

## 2018-06-28 NOTE — PROGRESS NOTES
MOHS previsit information                                                    Mary Carmen Jaime is a 57 year old female       Patient is being referred to Dr. Tyson  Referring Provider: Dr. Greenwood  For treatment of: basal cell carcinoma  Site(s): neck  Previous Records received:  N/A      Medication & Allergy Information                                                    CURRENT MEDICATIONS:   Current Outpatient Prescriptions   Medication Sig Dispense Refill     B Complex Vitamins (B COMPLEX PO) Take  by mouth. 1 daily       Black Cohosh ([employer] BLACK COHOSH) 540 MG CAPS 1 tablet daily, uncertain strength       CALCIUM & MAGNESIUM CARBONATES PO Take  by mouth. WITH ZINC:1 TABLET ONCE A DAY       Cinnamon 500 MG CAPS Take  by mouth. 1 TABLET ONCE A DAY       Digestive Enzymes (MULTI-ENZYME PO) Take 1 capsule by mouth daily        Flaxseed, Linseed, (FLAX PO) Take 1 capsule by mouth daily        GELATIN 1 ONCE A DAY       Green Tea, Camillia sinensis, (GREEN TEA PO) Take  by mouth. 1 TABLET ONCE A DAY       MOTRIN IB PO Take 400 mg by mouth every 4 hours as needed for moderate pain       Multiple Vitamins-Minerals (OCUVITE ADULT 50+) CAPS Take 1 capsule daily       MULTIVITAMIN OR 1 tablet daily       order for DME Equipment being ordered: EXOS, XS, short arm 1 Device 0     Probiotic Product (ACIDOPHILUS PROBIOTIC BLEND) CAPS 1 OTC tab daily       Specialty Vitamins Products (MENOPAUSE RELIEF PO)        VITAMIN D, CHOLECALCIFEROL, PO Take 2,000 Units by mouth daily         Do you take the following medications:  Aspirin:  YES  Ibuprofen, Advil, Motrin, Naproxen:   YES- PRN  Warfarin/Coumadin:   NO  Vitamin E:   NO  Plavix:   NO    Peyton's warts: NO   Garlic supplement:  NO   Antibiotic Prophylaxis:  NO  Fish Oil: YES-PRN    Do you have an ALLERGIC REACTION to any medications:  YES   Cephalosporins; Amoxicillin; and Penicillins      Past Medical History                                                    Do you  currently or have you previously had any of the following conditions:       HIV/AIDS:  NO    Hepatitis:  NO    Suppressed Immune System:  NO    Autoimmune disorder/Lupus:  NO    Prolonged bleeding or Bleeding disorder:  NO    Fever blisters/herpes, cold sores:  NO    Kidney disease:  NO     Creatinine   Date Value Ref Range Status   03/26/2018 0.76 0.52 - 1.04 mg/dL Final     ]    Implanted device (pacemaker, defibrillator):  NO.      History of artificial or heart valve replacement:  NO.      Skin cancer:  NO.      Previous Mohs surgery: NO.        Organ transplant: NO.      Diabetes: NO    Pregnant: NO    Keloids or Abnormal scars: NO    Mobility device (wheelchair, transfer difficulty): NO    Tobacco use:  NO.       History   Smoking Status     Never Smoker   Smokeless Tobacco     Never Used         Reviewed by:  Sindi Redding LPN

## 2018-06-28 NOTE — LETTER
"      Ms.Maxine NEHEMIAS Jaime  [address]  [facility] [address]        June 28, 2018    Dear MsOlivierAddison,    You have an upcoming appointment with Dr. Tyson for Mohs surgery. It is scheduled for July 19, 2018 at 8:30 AM. Please check-in 15 minutes prior to your appointment at check-in desk \"D\" on the 2nd floor. Our address is [address] [facility].  Please review these important reminders:    1) Expect to be here the majority of the morning.  The Mohs surgical procedure can be an extensive surgery requiring multiple stages. Each stage may take 1-2 hours.     2) You may eat breakfast. You may bring snacks or beverages with you.  3) Take all normal medications morning of surgery -- unless otherwise indicated by your physician   If you have an artificial heart valve we will prescribe an antibiotic. Please take one hour prior to surgery.     4) You will need a  to be with you if your surgical site is close to your eyes. You can get swelling/bruising immediately after surgery and will go home with a big bulky bandage that could obstruct your view of driving safely.     5) Wear comfortable clothing -- preferably a button down shirt or a loose fitted shirt (to avoid pulling over pressure bandage off when you get home). If your surgery site is on your leg, try wearing loose fitted pants. If your surgery site is on your arm, try wearing a short-sleeve shirt.     6) Bring reading material or other items to help pass time. We do have internet access available if you own a laptop, iPad, etc.    7) Women - do NOT wear make-up. We will be washing it off anyone needing surgery on the face     8) Do NOT stop blood thinning medication unless instructed to do so by your surgeon. This will include: Warfarin, Coumadin, Eliquis, Jantoven, Aspirin, Plavix and Pradaxa. If you are taking Warfarin or Coumadin, please have your INR blood test results sent to our office no more than 7 days prior to your surgery.     9) Tylenol " is a good alternative to take for headaches and pain, and can be taken throughout your surgery and postoperative recovery.    If you need to reschedule or have any questions or concerns, please call me at [phone].    Sincerely,      [facility]

## 2018-06-28 NOTE — PROGRESS NOTES
[facility] Dermatology Note    Dermatology Problem List:  1. NMSC  - BCC, neck, will schedule Mohs  2. Actinic keratosis  3. Rosacea     Encounter Date: Jun 28, 2018    CC:  Chief Complaint   Patient presents with     Other     consult MOHS neck BCC     History of Present Illness:  Ms. Mary Carmen Jaime is a 57 year old female who presents today for a Mohs consultation regarding a superficial and nodular basal cell carcinoma that was diagnosed on her neck after a biopsy with Dr. Huong Greenwood in family practice about 2 weeks ago. This is the patient's first diagnosed skin cancer. She would like to hear more about her diagnosis, as well as the Mohs procedure. She has some anxiety associated with the surgery, but does not feel the need to take calming medication. She is already scheduled for a skin exam with Dr. Ramos in August, but would like to know if she can have this done today to avoid another appointment. The patient reports no painful, bleeding, nonhealing, or pruritic lesions, and denies new or changing moles.      Past Medical History:   Patient Active Problem List   Diagnosis     Rosacea     FAMILY HX CONDITION NEC     CARDIOVASCULAR SCREENING; LDL GOAL LESS THAN 160     Advanced directives, counseling/discussion     Trigger finger of right thumb     Bilateral carpal tunnel syndrome     Left carpal tunnel syndrome     Past Medical History:   Diagnosis Date     ASCUS on Pap smear 8/7/2011    10/15/09 pap ASCUS neg HPV. Plan-- repeat screening pap smear in one year (due 10/15/2010) Pap 11/10/10 NIL.  Repeat 3 years.        Past Surgical History:   Procedure Laterality Date     C APPENDECTOMY  1996     COLONOSCOPY  1/10/2011    normal repeat 10 years     LAPAROSCOPIC TUBAL LIGATION  3/9/2012    Procedure:LAPAROSCOPIC TUBAL LIGATION; Laparoscopic Tubal; Surgeon:RUPAL FREEMAN; Location:[facility] OR     RELEASE CARPAL TUNNEL Right 6/24/2016    Procedure: RELEASE CARPAL TUNNEL;  Surgeon: Mariana  Parmjit Lowe DO;  Location: [facility] OR     RELEASE CARPAL TUNNEL Left 10/21/2016    Procedure: RELEASE CARPAL TUNNEL;  Surgeon: Parmjit Peña DO;  Location: [facility] OR     RELEASE TRIGGER FINGER Right 6/24/2016    Procedure: RELEASE TRIGGER FINGER;  Surgeon: Parmjit Peña DO;  Location: [facility] OR       Social History:  Admits to frequent sun exposure, including some blistering sunburns. Admits to use of tanning beds. She works as a /.     Family History:  No known family history of skin cancer.      Medications:  Current Outpatient Prescriptions   Medication Sig Dispense Refill     B Complex Vitamins (B COMPLEX PO) Take  by mouth. 1 daily       Black Cohosh ([employer]S BLACK COHOSH) 540 MG CAPS 1 tablet daily, uncertain strength       CALCIUM & MAGNESIUM CARBONATES PO Take  by mouth. WITH ZINC:1 TABLET ONCE A DAY       Cinnamon 500 MG CAPS Take  by mouth. 1 TABLET ONCE A DAY       Digestive Enzymes (MULTI-ENZYME PO) Take 1 capsule by mouth daily        Flaxseed, Linseed, (FLAX PO) Take 1 capsule by mouth daily        GELATIN 1 ONCE A DAY       Green Tea, Camillia sinensis, (GREEN TEA PO) Take  by mouth. 1 TABLET ONCE A DAY       MOTRIN IB PO Take 400 mg by mouth every 4 hours as needed for moderate pain       Multiple Vitamins-Minerals (OCUVITE ADULT 50+) CAPS Take 1 capsule daily       MULTIVITAMIN OR 1 tablet daily       order for DME Equipment being ordered: EXOS, XS, short arm 1 Device 0     Probiotic Product (ACIDOPHILUS PROBIOTIC BLEND) CAPS 1 OTC tab daily       Specialty Vitamins Products (MENOPAUSE RELIEF PO)        VITAMIN D, CHOLECALCIFEROL, PO Take 2,000 Units by mouth daily         Allergies   Allergen Reactions     Cephalosporins Rash     Amoxicillin Rash     Penicillins Rash       Review of Systems:  -Skin Establ Pt: The patient denies any new rash, pruritus, or lesions that are symptomatic, changing or bleeding, except as per HPI.  -Constitutional: The patient is  feeling generally well.    Physical exam:  Vitals: LMP 11/01/2013  GEN: This is a well developed, well-nourished female in no acute distress, in a pleasant mood.    SKIN: Total skin excluding the undergarment areas was performed. The exam included the head/face, neck, both arms, chest, back, abdomen, both legs, digits and/or nails.   - 2.0 x 1.6 cm pink atrophic macule on the neck.   - 0.6 x 0.5 cm beige dome shaped slightly pearly papule with an arborizing vessel and grey pigment globules under dermatoscopy on the left forehead.   - Multiple regular brown pigmented macules and papules are identified on the trunk and extremities.   - Scattered brown macules on sun exposed areas.  - No other lesions of concern on areas examined.       Impression/Plan:  1.   Basal cell carcinoma, superficial and nodular types, neck (supersternal notch)     Discussed nature and etiology of basal cell carcinoma.     AUC Score = 8    Risks, benefits, and process of Mohs micrographic surgery were discussed including possibility of damage to surrounding anatomical structures and infection. Patient is comfortable proceeding with the surgery; consent form was obtained. She is scheduled for 7/19/18.    Patient is allergic to penicillin, amoxicillin, and cephalosporin; however no indications at the time for pre-op antibiotics.     Discussed likely repairs for this location.     Pre-op written instructions provided.     2.   Neoplasm of uncertain behavior on the left forehead. The differential diagnosis includes pigmented BCC vs dermal nevus vs other.    After discussion of benefits and risks including but not limited to bleeding, infection, scar, incomplete removal, recurrence, and non-diagnostic biopsy, written consent and photographs were obtained. The area was cleaned with isopropyl alcohol. 0.75 mL of 1% lidocaine with epinephrine was injected to obtain adequate anesthesia of the lesion on the left forehead. A shave biopsy was performed.  Hemostasis was achieved with aluminium chloride. Vaseline and a sterile dressing were applied. The patient tolerated the procedure and no complications were noted. The patient was provided with verbal and written post care instructions.     Patient will be notified with results.     3.   Clinically benign nevi, trunk and extremities    Clinically benign according to ABCDE criteria.     No further intervention required. Patient to report changes.     4.    Solar lentigines     Recommend sunscreens SPF #30 or greater, protective clothing and avoidance of tanning beds.    Benign nature reassured. No further intervention required.     Follow-up as scheduled for MMS.       Staff Involved:      Lana Devries MD (Resident PGY2)  Staff- Dr. Tyson    Scribe Disclosure  I, Jamil Ronquillo, am serving as a scribe to document services personally performed by Dr. Shiv Tyson DO, based on data collection and the provider's statements to me.     Staff Physician Comments:   I saw and evaluated the patient with the resident (Dr. Devries) and I agree with the assessment and plan.  I was physically present for all key portions of the procedure today. I was immediately available at all times.    Shiv Tyson DO    Department of Dermatology  [facility]: Phone: [phone], Fax:[phone]  [facility]: Phone: [phone], Fax: [phone]

## 2018-06-28 NOTE — MR AVS SNAPSHOT
After Visit Summary   6/28/2018    Mary Carmen Jaime    MRN: [id number]           Patient Information     Date Of Birth          1960        Visit Information        Provider Department      6/28/2018 3:30 PM Shiv Tyson MD [facility]         Follow-ups after your visit        Your next 10 appointments already scheduled     Jul 19, 2018  8:30 AM CDT   [facility]    [address] [address] [place] [address] [phone]            Aug 16, 2018  1:45 PM CDT   Return Visit with Sravanthi Ramos MD   [facility]    [address] [address] [place] [address] [phone]              Who to contact     If you have questions or need follow up information about today's clinic visit or your schedule please contact [facility] directly at [phone].  Normal or non-critical lab and imaging results will be communicated to you by MyChart, letter or phone within 4 business days after the clinic has received the results. If you do not hear from us within 7 days, please contact the clinic through MyChart or phone. If you have a critical or abnormal lab result, we will notify you by phone as soon as possible.  Submit refill requests through re3D or call your pharmacy and they will forward the refill request to us. Please allow 3 business days for your refill to be completed.          Additional Information About Your Visit        Care EveryWhere ID     This is your Care EveryWhere ID. This could be used by other organizations to access your [place] medical records  [id number]        Your Vitals Were     Last Period                   11/01/2013            Blood Pressure from Last 3 Encounters:   06/12/18 120/60   05/02/18 118/68   03/26/18 120/72    Weight from Last 3 Encounters:   06/12/18 67.1 kg (148 lb)   05/02/18 66.5 kg (146  lb 9.6 oz)   03/26/18 67.1 kg (148 lb)              Today, you had the following     No orders found for display       Primary Care Provider Office Phone # Fax #    Huong Aleah Greenwood [phone] [address] [place] [address]        Equal Access to Services     TRISH DOWNS : Hadii aad ku hadasho Soomaali, waaxda luqadaha, qaybta kaalmada adeegyada, waxay idiin hayaan adeeg kharash la'aan ah. So [facility] [phone].    ATENCIÓN: Si habla español, tiene a valadez disposición servicios gratuitos de asistencia lingüística. Llame al [phone].    We comply with applicable federal civil rights laws and [place] laws. We do not discriminate on the basis of race, color, national origin, age, disability, sex, sexual orientation, or gender identity.            Thank you!     Thank you for choosing [facility]  for your care. Our goal is always to provide you with excellent care. Hearing back from our patients is one way we can continue to improve our services. Please take a few minutes to complete the written survey that you may receive in the mail after your visit with us. Thank you!             Your Updated Medication List - Protect others around you: Learn how to safely use, store and throw away your medicines at www.disposemymeds.org.          This list is accurate as of 6/28/18  3:31 PM.  Always use your most recent med list.                   Brand Name Dispense Instructions for use Diagnosis    ACIDOPHILUS PROBIOTIC BLEND Caps      1 OTC tab daily    Encounter for routine adult health examination with abnormal findings       B COMPLEX PO      Take  by mouth. 1 daily        Black Cohosh 540 MG Caps    [employer] BLACK COHOSH     1 tablet daily, uncertain strength    Menopause       CALCIUM & MAGNESIUM CARBONATES PO      Take  by mouth. WITH ZINC:1 TABLET ONCE A DAY        cinnamon 500 MG Caps      Take  by mouth. 1 TABLET ONCE A DAY        FLAX PO      Take 1 capsule by  mouth daily        GELATIN      1 ONCE A DAY        GREEN TEA PO      Take  by mouth. 1 TABLET ONCE A DAY        MENOPAUSE RELIEF PO           MOTRIN IB PO      Take 400 mg by mouth every 4 hours as needed for moderate pain        MULTI-ENZYME PO      Take 1 capsule by mouth daily        * MULTIVITAMIN PO      1 tablet daily        * OCUVITE ADULT 50+ Caps      Take 1 capsule daily    Encounter for routine adult health examination without abnormal findings       order for DME     1 Device    Equipment being ordered: EXOS, XS, short arm    Closed nondisplaced fracture of styloid process of right radius, initial encounter       VITAMIN D (CHOLECALCIFEROL) PO      Take 2,000 Units by mouth daily        * Notice:  This list has 2 medication(s) that are the same as other medications prescribed for you. Read the directions carefully, and ask your doctor or other care provider to review them with you.

## 2018-07-03 LAB — COPATH REPORT: NORMAL

## 2018-07-19 ENCOUNTER — OFFICE VISIT (OUTPATIENT)
Dept: DERMATOLOGY | Facility: CLINIC | Age: 58
End: 2018-07-19
Payer: COMMERCIAL

## 2018-07-19 DIAGNOSIS — C44.41 BASAL CELL CARCINOMA OF NECK: Primary | ICD-10-CM

## 2018-07-19 PROCEDURE — 17311 MOHS 1 STAGE H/N/HF/G: CPT | Performed by: DERMATOLOGY

## 2018-07-19 PROCEDURE — 14041 TIS TRNFR F/C/C/M/N/A/G/H/F: CPT | Performed by: DERMATOLOGY

## 2018-07-19 PROCEDURE — 17312 MOHS ADDL STAGE: CPT | Performed by: DERMATOLOGY

## 2018-07-19 NOTE — NURSING NOTE
Vaseline and pressure dressing applied to Mohs site on neck.  Wound care instructions reviewed with patient and AVS provided.  Patient verbalized understanding.  Patient will follow up for wound check on August 1, 2018.  No further questions or concerns at this time.    Naina Soler, CMA

## 2018-07-19 NOTE — NURSING NOTE
Mary Carmen Jaime's goals for this visit include:   Chief Complaint   Patient presents with     Procedure     BCC        She requests these members of her care team be copied on today's visit information: yes    PCP: Huong Greenwood    Referring Provider:  No referring provider defined for this encounter.    LMP 11/01/2013    Do you need any medication refills at today's visit? No     Amorrlucero Mathews CMA

## 2018-07-19 NOTE — MR AVS SNAPSHOT
After Visit Summary   7/19/2018    Mary Carmen Jaime    MRN: [id number]           Patient Information     Date Of Birth          1960        Visit Information        Provider Department      7/19/2018 8:30 AM Shiv Tyson MD [facility]        Care Instructions    Excision Wound Care Instructions  I will experience scar, altered skin color, bleeding, swelling, pain, crusting and redness. I may experience altered sensation. Risks are excessive bleeding, infection, muscle weakness, thick (hypertrophic or keloidal) scar, and recurrence,. A second procedure may be recommended to obtain the best cosmetic or functional result.  Possible complications of any surgical procedure are bleeding, infection, scarring, alteration in skin color and sensation, muscle weakness in the area, wound dehiscence or seperation, or recurrence of the lesion or disease. On occasion, after healing, a secondary procedure or revision may be recommended in order to obtain the best cosmetic or functional result.   After your surgery, a pressure bandage will be placed over the area that has sutures. This will help prevent bleeding. Please follow these instructions, as they will help you to prevent complications as your wound heals.  For the First 48 hours After Surgery:  1. Leave the pressure bandage on and keep it dry. If it should come loose, you may retape it, but do not take it off.  2. Relax and take it easy. Do not do any vigorous exercise, heavy lifting, or bending forward. This could cause the wound to bleed.  3. Post-operative pain is usually mild. You may take plain or extra strength Tylenol every 4 hours as needed (do not take more than 4,000mg in one day). Do not take any medicine that contains aspirin, ibuprofen or motrin unless you have been recommended these by a doctor.  Avoid alcohol and vitamin E as these may increase your tendency to bleed.  4. You may put an ice pack around the bandaged area  for 20 minutes every 2-3 hours. This may help reduce swelling, bruising, and pain. Make sure the ice pack is waterproof so that the pressure bandage does not get wet.   5. You may see a small amount of drainage or blood on your pressure bandage. This is normal. However, if drainage or bleeding continues or saturates the bandage, you will need to apply firm pressure over the bandage with a washcloth for 15 minutes. If bleeding continues after applying pressure for 15 minutes then go to the nearest emergency room.  48 Hours After Surgery  Carefully remove the bandage and start daily wound care and dressing changes. You may also now shower and get the wound wet. Wash wound with a mild soap and water.  Use caution when washing the wound. Be gentle and do not let the forceful shower stream hit the wound directly.  PAT dry.  Daily Wound Care:  1. Wash wound with a mild soap and water.  Use caution when washing the wound, be gentle and do not let the forceful shower stream hit the wound directly.  2. PAT DRY.  3. Apply Vaseline (from a new container or tube) over the suture line with a Q-tip. It is very important to keep the wound continuously moist, as wounds heal best in a moist environment.  4.  Keep the site covered until sutures are removed, you can cover it with a Telfa (non-stick) dressing and tape or a band-aid.    5. If you are unable to keep wound covered, you must apply Vaseline every 2 - 3 hours (while awake) to ensure it is being kept moist for optimal healing. A dressing overnight is recommended to keep the area moist.   Call Us If:  1. You have pain that is not controlled with Tylenol.  2. You have signs or symptoms of an infection, such as: fever over 100 degrees F, redness, warmth, or foul-smelling or yellow/creamy drainage from the wound.  Who should I call with questions?    [facility]: [phone]     [facility]: [phone]    For  urgent needs outside of business hours call the [facility] at [phone] and ask for the dermatology resident on call              Follow-ups after your visit        Your next 10 appointments already scheduled     Aug 01, 2018  2:00 PM CDT   Return Visit with Shiv Tyson MD   [facility] ([facility])    [id number] [place] [id number] [phone]              Who to contact     If you have questions or need follow up information about today's clinic visit or your schedule please contact [facility] directly at [phone].  Normal or non-critical lab and imaging results will be communicated to you by MyChart, letter or phone within 4 business days after the clinic has received the results. If you do not hear from us within 7 days, please contact the clinic through MyChart or phone. If you have a critical or abnormal lab result, we will notify you by phone as soon as possible.  Submit refill requests through [employer] or call your pharmacy and they will forward the refill request to us. Please allow 3 business days for your refill to be completed.          Additional Information About Your Visit        Care EveryWhere ID     This is your Care EveryWhere ID. This could be used by other organizations to access your [facility] medical records  [id number]        Your Vitals Were     Last Period                   11/01/2013            Blood Pressure from Last 3 Encounters:   06/12/18 120/60   05/02/18 118/68   03/26/18 120/72    Weight from Last 3 Encounters:   06/12/18 67.1 kg (148 lb)   05/02/18 66.5 kg (146 lb 9.6 oz)   03/26/18 67.1 kg (148 lb)              Today, you had the following     No orders found for display       Primary Care Provider Office Phone # Fax #    Huong Greenwood [phone]       [address] [place] [address]        Equal Access to Services     TRACIE DOWNS AH: Vero shepherd  Coelenfam, nardada luqadaha, qaybta kaalkendall herrera, yariel tarango baldemargwen mayfieldbaron natasha. So [facility] [phone].    ATENCIÓN: Si rayo carreno, tiene a valadez disposición servicios gratuitos de asistencia lingüística. Mahendra al [phone].    We comply with applicable federal civil rights laws and [place] laws. We do not discriminate on the basis of race, color, national origin, age, disability, sex, sexual orientation, or gender identity.            Thank you!     Thank you for choosing [facility]  for your care. Our goal is always to provide you with excellent care. Hearing back from our patients is one way we can continue to improve our services. Please take a few minutes to complete the written survey that you may receive in the mail after your visit with us. Thank you!             Your Updated Medication List - Protect others around you: Learn how to safely use, store and throw away your medicines at www.disposemymeds.org.          This list is accurate as of 7/19/18 12:35 PM.  Always use your most recent med list.                   Brand Name Dispense Instructions for use Diagnosis    ACIDOPHILUS PROBIOTIC BLEND Caps      1 OTC tab daily    Encounter for routine adult health examination with abnormal findings       B COMPLEX PO      Take  by mouth. 1 daily        Black Cohosh 540 MG Caps    [facility] BLACK COHOSH     1 tablet daily, uncertain strength    Menopause       CALCIUM & MAGNESIUM CARBONATES PO      Take  by mouth. WITH ZINC:1 TABLET ONCE A DAY        cinnamon 500 MG Caps      Take  by mouth. 1 TABLET ONCE A DAY        FLAX PO      Take 1 capsule by mouth daily        GELATIN      1 ONCE A DAY        GREEN TEA PO      Take  by mouth. 1 TABLET ONCE A DAY        MENOPAUSE RELIEF PO           MOTRIN IB PO      Take 400 mg by mouth every 4 hours as needed for moderate pain        MULTI-ENZYME PO      Take 1 capsule by mouth daily        * MULTIVITAMIN PO      1 tablet daily        *  OCUVITE ADULT 50+ Caps      Take 1 capsule daily    Encounter for routine adult health examination without abnormal findings       order for DME     1 Device    Equipment being ordered: EXOS, XS, short arm    Closed nondisplaced fracture of styloid process of right radius, initial encounter       VITAMIN D (CHOLECALCIFEROL) PO      Take 2,000 Units by mouth daily        * Notice:  This list has 2 medication(s) that are the same as other medications prescribed for you. Read the directions carefully, and ask your doctor or other care provider to review them with you.

## 2018-07-19 NOTE — PATIENT INSTRUCTIONS

## 2018-07-19 NOTE — LETTER
7/19/2018         RE: Mary Carmen Jaime  [address]  [place] [address]        Dear Colleague,    Thank you for referring your patient, Mary Carmen Jaime, to the [facility]. Please see a copy of my visit note below.    See procedure note for details.     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  Because of its size, location and morphology, Mohs surgery was indicated.  After appropriate discussion and informed consent the patient underwent Mohs surgery using the fresh tissue technique as follows:    STAGE I:  The patient was placed on the operating room table.  The area was infiltrated with 1% lidocaine and epinephrine. The tumor was debulked using a 4mm curette. Using a #15-blade, complete excision was made around the tumor in one section.  Hemostasis was obtained by electrodessication.  A dressing was placed.  Tissue was divided into two tissue blocks which were subsequently mapped, color coded at their margins and processed in the Mohs Laboratory.  Microscopic tumor (superficial basal cell carcinoma) was found in one of the tissue blocks.    STAGE II: The patient returned to the operating room.  By reference to the Mohs map, the area of positivity was delineated, infiltrated with the local anesthetic described above, and excised in one section.  Tissue was embedded into one tissue block that was again mapped, color coded and processed in the Mohs Laboratory.  Hemostasis was obtained in the usual manner and a dressing placed.  Microscopic tumor was not found in the tissue blocks.    With the lesion clear  of micrographic tumor, surgery was considered complete.  The defect extended into the subcutaneous fat and measured 4.0 cm x 3.0 cm.    Shiv Tyson DO             Test Performed at:   Dept. of Dermatology   [facility]    [facility]    [address] [place] [address]          [phone]      DERMATOLOGIC SURGERY REPORT    NAME OF PROCEDURE:  MERCEDES ADVANCEMENT FLAP    Surgeon: Shiv Tyson DO  Fellow:  None  Resident: None    PREOPERATIVE DIAGNOSIS:  Status post Mohs excision of a primary superficial and nodular basal cell carcinoma  POSTOPERATIVE DIAGNOSIS:  Same    INDICATIONS:  This patient was left with a 4.0 cm x 3.0 cm defect involving the neck following Mohs excision of a primary superficial and nodular basal cell carcinoma.  In order to repair these defects while maintaining the normal anatomic relations and function, we elected to utilize a Mercedes advancement flap closure.  We discussed the principles of treatment and most likely complications including bleeding, infection, wound dehiscence and scarring.  Informed consent was obtained and the patient underwent the procedure as follows:    PROCEDURE:  The patient was taken to the operative suite and placed on the operating room table.  The treatment area was anesthetized with 1% Lidocaine and epinephrine.  The area was washed with Hibiclens, rinsed with saline and draped with sterile towels.  The wound edges were extensively undermined.  The advancement flap was designed, incised and elevated.  Three initial Burow's triangles were incised and removed.  Hemostasis was obtained by bipolar electrocoagulation.  Closure was oriented so that the wound was in the patient's natural skin tension lines.  Deep subcutaneous and dermal closure was performed using 4-0 Vicryl running subcuticular sutures.  Intradermal closure was performed using 4-0 PDS sutures.      The final  dimensions of the defect were 4.0 cm x 3.0 cm totaling 12.0 cm2.  The final flap size was 4.5 cm x 3.5 cm totaling 15.8 cm2.  The total tissue transfer and rearrangement measured 27.8 cm2.  Total anesthesia used was 9.0 ml and estimated blood loss was less than 5.0 ml.  A sterile pressure dressing was applied and wound care instructions, with a written handout, were given.  The patient was discharged from the [facility] alert and ambulatory.    Shiv Tyson DO             Test Performed at:   Dept. of Dermatology   [facility]    Dermatologic Surgery & [facility] Center    [address] [place] [address]          [phone]          Again, thank you for allowing me to participate in the care of your patient.        Sincerely,        Shiv Tyson MD

## 2018-07-19 NOTE — PROGRESS NOTES
DERMATOLOGIC SURGERY REPORT    NAME OF PROCEDURE:  MOHS MICROGRAPHIC SURGERY    Surgeon: Shiv Tyson DO  Fellow:  None  Resident: None  Mohs ID: [id number]    PREOPERATIVE DIAGNOSIS:   Primary superficial and nodular basal cell carcinoma of the neck  POSTOPERATIVE DIAGNOSIS:   Same    INDICATIONS:  This patient presented with a primary superficial and nodular basal cell carcinoma located on the neck, measuring 2.5 cm x 1.6 cm.  Because of its size, location and morphology, Mohs surgery was indicated.  After appropriate discussion and informed consent the patient underwent Mohs surgery using the fresh tissue technique as follows:    STAGE I:  The patient was placed on the operating room table.  The area was infiltrated with 1% lidocaine and epinephrine. The tumor was debulked using a 4mm curette. Using a #15-blade, complete excision was made around the tumor in one section.  Hemostasis was obtained by electrodessication.  A dressing was placed.  Tissue was divided into two tissue blocks which were subsequently mapped, color coded at their margins and processed in the Mohs Laboratory.  Microscopic tumor (superficial basal cell carcinoma) was found in one of the tissue blocks.    STAGE II: The patient returned to the operating room.  By reference to the Mohs map, the area of positivity was delineated, infiltrated with the local anesthetic described above, and excised in one section.  Tissue was embedded into one tissue block that was again mapped, color coded and processed in the Mohs Laboratory.  Hemostasis was obtained in the usual manner and a dressing placed.  Microscopic tumor was not found in the tissue blocks.    With the lesion clear of micrographic tumor, surgery was considered complete.  The defect extended into the subcutaneous fat and measured 4.0 cm x 3.0 cm.    Shiv Tyson DO             Test Performed at:   Dept. of Dermatology   [facility]    Dermatologic  Surgery & Laser Center    [address] [place] [address]          [phone]      DERMATOLOGIC SURGERY REPORT    NAME OF PROCEDURE:  MERCEDES ADVANCEMENT FLAP    Surgeon: Shiv Tyson DO  Fellow:  None  Resident: None    PREOPERATIVE DIAGNOSIS:  Status post Mohs excision of a primary superficial and nodular basal cell carcinoma  POSTOPERATIVE DIAGNOSIS:  Same    INDICATIONS:  This patient was left with a 4.0 cm x 3.0 cm defect involving the neck following Mohs excision of a primary superficial and nodular basal cell carcinoma.  In order to repair these defects while maintaining the normal anatomic relations and function, we elected to utilize a Mercedes advancement flap closure.  We discussed the principles of treatment and most likely complications including bleeding, infection, wound dehiscence and scarring.  Informed consent was obtained and the patient underwent the procedure as follows:    PROCEDURE:  The patient was taken to the operative suite and placed on the operating room table.  The treatment area was anesthetized with 1% Lidocaine and epinephrine.  The area was washed with Hibiclens, rinsed with saline and draped with sterile towels.  The wound edges were extensively undermined.  The advancement flap was designed, incised and elevated.  Three initial Burow's triangles were incised and removed.  Hemostasis was obtained by bipolar electrocoagulation.  Closure was oriented so that the wound was in the patient's natural skin tension lines.  Deep subcutaneous and dermal closure was performed using 4-0 Vicryl running subcuticular sutures.  Intradermal closure was performed using 4-0 PDS sutures.      The final dimensions of the defect were 4.0 cm x 3.0 cm totaling 12.0 cm2.  The final flap size was 4.5 cm x 3.5 cm totaling 15.8 cm2.  The total tissue transfer and rearrangement measured 27.8 cm2.  Total anesthesia used was 9.0 ml and estimated blood loss was less than 5.0 ml.  A sterile  pressure dressing was applied and wound care instructions, with a written handout, were given.  The patient was discharged from the [facility] alert and ambulatory.    Shiv Tyson, DO             Test Performed at:   Dept. of Dermatology   [facility]    Dermatologic Surgery [facility] Center    [address] [place] [address]          [phone]

## 2018-08-01 ENCOUNTER — OFFICE VISIT (OUTPATIENT)
Dept: DERMATOLOGY | Facility: CLINIC | Age: 58
End: 2018-08-01
Payer: COMMERCIAL

## 2018-08-01 DIAGNOSIS — C44.41 BASAL CELL CARCINOMA OF SKIN OF NECK: Primary | ICD-10-CM

## 2018-08-01 PROCEDURE — 99024 POSTOP FOLLOW-UP VISIT: CPT | Performed by: DERMATOLOGY

## 2018-08-01 NOTE — NURSING NOTE
Mary Carmen Jaime's goals for this visit include:   Chief Complaint   Patient presents with     RECHECK     wound check        She requests these members of her care team be copied on today's visit information: yes    PCP: Huong Greenwood    Referring Provider:  No referring provider defined for this encounter.    LMP 11/01/2013    Do you need any medication refills at today's visit? No     Amorrlucero Mathews CMA

## 2018-08-01 NOTE — LETTER
8/1/2018         RE: Mary Carmen Jaime  [address]  [place] [address]        Dear Colleague,    Thank you for referring your patient, Mary Carmen Jaime, to the [facility]. Please see a copy of my visit note below.    [facility] Dermatology Post-operative Visit note:  Subjective: The patient follows up for a wound check after undergoing Mohs surgery to remove a superficial and nodular basal cell carcinoma from the neck on 7/19/18. The resulting defect was then repaired with a mercedes advancement flap.   At today's visit, the patient says the site is doing well and denies any bleeding, purulence, or excess tenderness. She has not experienced any significant pain. She inquires if vitamin E oil or aloe vera would be okay to apply to the scar. The patient is otherwise feeling well. There are no other skin concerns at this time.  Objective: On exam, there is an appropriately healing surgical site on the neck with no purulence, tenderness or surrounding erythema. Wound edges are well approximated.   Assessment and Plan: Appropriately healing surgical site without any signs of infection    Reassured that suture line will continue to flatten as the scar heals. Written instructions for scar massage to help prevent build up of scar tissue.     Instructed to continue application of petroleum jelly until healed over with new pink skin and all sutures are dissolved. Otherwise okay to resume normal skin care routine.     Return in 3 months for scar evaluation, at which time we can consider treatments such as dermabrasion to minimize scar's appearance if desired.    Recommended sunscreens SPF #50 or greater and protective clothing. Risk of scar dyspigmentation discussed.     Continue periodic self skin exams and report of any new or changing lesions.     Please refer to previous clinic note for details regarding treatment.  Follow up in 3 months for final evaluation.      Staff:    Scribe Disclosure  I, Jamil Yg, am serving as a scribe to document services personally performed by Dr. Shiv Tyson DO, based on data collection and the provider's statements to me.     Provider Disclosure:   The documentation recorded by the scribe accurately reflects the services I personally performed and the decisions made by me.    Shiv Tyson DO    Department of Dermatology  [facility]: Phone: [phone], Fax:[phone]  [facility]: Phone: [phone], Fax: [phone]          Again, thank you for allowing me to participate in the care of your patient.        Sincerely,        Shiv Tyson MD

## 2018-08-01 NOTE — MR AVS SNAPSHOT
After Visit Summary   8/1/2018    Mary Carmen Jaime    MRN: [id number]           Patient Information     Date Of Birth          1960        Visit Information        Provider Department      8/1/2018 2:00 PM Shiv Tyson MD [facility]        Care Instructions    Scar massage for previous surgical site       Begin daily massages along the suture line to loosen up scar tissue. Massage along the scar line in circular motions with your fingertip and a little petroleum jelly, applying substantial pressure. Do this for 1-2 minutes, 5-10 times a day.                Follow-ups after your visit        Your next 10 appointments already scheduled     Aug 01, 2018  2:00 PM CDT   Return Visit with Shiv Tyson MD   [facility]    [address] [place] [address] [phone]            Nov 05, 2018  1:00 PM CST   Return Visit with Shiv Tyson MD   [facility]    [address] [place] [address]   [phone]              Who to contact     If you have questions or need follow up information about today's clinic visit or your schedule please contact [facility] directly at [phone].  Normal or non-critical lab and imaging results will be communicated to you by MyChart, letter or phone within 4 business days after the clinic has received the results. If you do not hear from us within 7 days, please contact the clinic through MyChart or phone. If you have a critical or abnormal lab result, we will notify you by phone as soon as possible.  Submit refill requests through Modern Mast or call your pharmacy and they will forward the refill request to us. Please allow 3 business days for your refill to be completed.          Additional Information About Your Visit        Care EveryWhere ID     This is your Care EveryWhere ID. This could be used by other  organizations to access your [place] medical records  [id number]        Your Vitals Were     Last Period                   11/01/2013            Blood Pressure from Last 3 Encounters:   06/12/18 120/60   05/02/18 118/68   03/26/18 120/72    Weight from Last 3 Encounters:   06/12/18 67.1 kg (148 lb)   05/02/18 66.5 kg (146 lb 9.6 oz)   03/26/18 67.1 kg (148 lb)              Today, you had the following     No orders found for display       Primary Care Provider Office Phone # Fax #    Huong Aleah Greenwood [phone] [address] [place] [address]        Equal Access to Services     TRACIE DOWNS : Hadii bruce huntero Sosevenali, waaxda luqadaha, qaybta kaalmada adeegyada, yariel kaur. So [facility] [phone].    ATENCIÓN: Si habla español, tiene a valadez disposición servicios gratuitos de asistencia lingüística. [facility] [phone].    We comply with applicable federal civil rights laws and [place] laws. We do not discriminate on the basis of race, color, national origin, age, disability, sex, sexual orientation, or gender identity.            Thank you!     Thank you for choosing [facility]  for your care. Our goal is always to provide you with excellent care. Hearing back from our patients is one way we can continue to improve our services. Please take a few minutes to complete the written survey that you may receive in the mail after your visit with us. Thank you!             Your Updated Medication List - Protect others around you: Learn how to safely use, store and throw away your medicines at www.disposemymeds.org.          This list is accurate as of 8/1/18  1:54 PM.  Always use your most recent med list.                   Brand Name Dispense Instructions for use Diagnosis    ACIDOPHILUS PROBIOTIC BLEND Caps      1 OTC tab daily    Encounter for routine adult health examination with abnormal findings       B COMPLEX PO       Take  by mouth. 1 daily        Black Cohosh 540 MG Caps    [facility] BLACK COHOSH     1 tablet daily, uncertain strength    Menopause       CALCIUM & MAGNESIUM CARBONATES PO      Take  by mouth. WITH ZINC:1 TABLET ONCE A DAY        cinnamon 500 MG Caps      Take  by mouth. 1 TABLET ONCE A DAY        FLAX PO      Take 1 capsule by mouth daily        GELATIN      1 ONCE A DAY        GREEN TEA PO      Take  by mouth. 1 TABLET ONCE A DAY        MENOPAUSE RELIEF PO           MOTRIN IB PO      Take 400 mg by mouth every 4 hours as needed for moderate pain        MULTI-ENZYME PO      Take 1 capsule by mouth daily        * MULTIVITAMIN PO      1 tablet daily        * OCUVITE ADULT 50+ Caps      Take 1 capsule daily    Encounter for routine adult health examination without abnormal findings       order for DME     1 Device    Equipment being ordered: EXOS, XS, short arm    Closed nondisplaced fracture of styloid process of right radius, initial encounter       VITAMIN D (CHOLECALCIFEROL) PO      Take 2,000 Units by mouth daily        * Notice:  This list has 2 medication(s) that are the same as other medications prescribed for you. Read the directions carefully, and ask your doctor or other care provider to review them with you.

## 2018-08-01 NOTE — PROGRESS NOTES
[facility] Dermatology Post-operative Visit note:  Subjective: The patient follows up for a wound check after undergoing Mohs surgery to remove a superficial and nodular basal cell carcinoma from the neck on 7/19/18. The resulting defect was then repaired with a mercedes advancement flap.   At today's visit, the patient says the site is doing well and denies any bleeding, purulence, or excess tenderness. She has not experienced any significant pain. She inquires if vitamin E oil or aloe vera would be okay to apply to the scar. The patient is otherwise feeling well. There are no other skin concerns at this time.  Objective: On exam, there is an appropriately healing surgical site on the neck with no purulence, tenderness or surrounding erythema. Wound edges are well approximated.   Assessment and Plan: Appropriately healing surgical site without any signs of infection    Reassured that suture line will continue to flatten as the scar heals. Written instructions for scar massage to help prevent build up of scar tissue.     Instructed to continue application of petroleum jelly until healed over with new pink skin and all sutures are dissolved. Otherwise okay to resume normal skin care routine.     Return in 3 months for scar evaluation, at which time we can consider treatments such as dermabrasion to minimize scar's appearance if desired.    Recommended sunscreens SPF #50 or greater and protective clothing. Risk of scar dyspigmentation discussed.     Continue periodic self skin exams and report of any new or changing lesions.     Please refer to previous clinic note for details regarding treatment.  Follow up in 3 months for final evaluation.     Staff:    Scribe Disclosure  I, Jamil Ronquillo, am serving as a scribe to document services personally performed by Dr. Shiv Tyson DO, based on data collection and the provider's statements to me.     Provider Disclosure:   The documentation  recorded by the scribe accurately reflects the services I personally performed and the decisions made by me.    Shiv Tyson DO    Department of Dermatology  [facility]: Phone: [phone], Fax:[phone]  [facility]: Phone: [phone], Fax: [phone]

## 2018-08-01 NOTE — PATIENT INSTRUCTIONS
Scar massage for previous surgical site       Begin daily massages along the suture line to loosen up scar tissue. Massage along the scar line in circular motions with your fingertip and a little petroleum jelly, applying substantial pressure. Do this for 1-2 minutes, 5-10 times a day.

## 2018-10-09 ENCOUNTER — ALLIED HEALTH/NURSE VISIT (OUTPATIENT)
Dept: FAMILY MEDICINE | Facility: CLINIC | Age: 58
End: 2018-10-09
Payer: COMMERCIAL

## 2018-10-09 DIAGNOSIS — Z23 NEED FOR PROPHYLACTIC VACCINATION AND INOCULATION AGAINST INFLUENZA: Primary | ICD-10-CM

## 2018-10-09 PROCEDURE — 90471 IMMUNIZATION ADMIN: CPT

## 2018-10-09 PROCEDURE — 90682 RIV4 VACC RECOMBINANT DNA IM: CPT

## 2018-10-09 PROCEDURE — 99207 ZZC NO CHARGE NURSE ONLY: CPT

## 2018-10-09 NOTE — PROGRESS NOTES
Injectable Influenza Immunization Documentation    1.  Is the person to be vaccinated sick today?   No    2. Does the person to be vaccinated have an allergy to a component   of the vaccine?   No  Egg Allergy Algorithm Link    3. Has the person to be vaccinated ever had a serious reaction   to influenza vaccine in the past?   No    4. Has the person to be vaccinated ever had Guillain-Barré syndrome?   No    Form completed by Slime Pedraza CMA  Prior to injection verified patient identity using patient's name and date of birth.  Due to injection administration, patient instructed to remain in clinic for 15 minutes  afterwards, and to report any adverse reaction to me immediately.

## 2018-10-09 NOTE — MR AVS SNAPSHOT
"              After Visit Summary   10/9/2018    Mary Carmen Jaime    MRN: [id number]           Patient Information     Date Of Birth          1960        Visit Information        Provider Department      10/9/2018 1:45 PM MARIE JUDESHIRA [facility]        Today's Diagnoses     Need for prophylactic vaccination and inoculation against influenza    -  1       Follow-ups after your visit        Your next 10 appointments already scheduled     Oct 09, 2018  1:45 PM CDT   Nurse Only with [facility] ([facility])    [address] [id number] [phone]            Nov 05, 2018  1:00 PM CST   [facility] Visit with Shiv Tyson MD   [facility]    [address] [place] [address] [phone]              Who to contact     If you have questions or need follow up information about today's clinic visit or your schedule please contact [facility] directly at [phone].  Normal or non-critical lab and imaging results will be communicated to you by Klypperhart, letter or phone within 4 business days after the clinic has received the results. If you do not hear from us within 7 days, please contact the clinic through Klypperhart or phone. If you have a critical or abnormal lab result, we will notify you by phone as soon as possible.  Submit refill requests through StackSocial or call your pharmacy and they will forward the refill request to us. Please allow 3 business days for your refill to be completed.          Additional Information About Your Visit        KlypperharCX Information     StackSocial lets you send messages to your doctor, view your test results, renew your prescriptions, schedule appointments and more. To sign up, go to [email or web address] . Click on \"Log in\" on the left side of the screen, which will take you to the Welcome page. Then click on \"Sign up Now\" on the " right side of the page.     You will be asked to enter the access code listed below, as well as some personal information. Please follow the directions to create your username and password.     Your access code is: [email or web address]  Expires: 2019  1:32 PM     Your access code will  in 90 days. If you need help or a new code, please call your [place] clinic or [phone].        Care EveryWhere ID     This is your Care EveryWhere ID. This could be used by other organizations to access your [place] medical records  [id number]        Your Vitals Were     Last Period                   2013            Blood Pressure from Last 3 Encounters:   18 120/60   18 118/68   18 120/72    Weight from Last 3 Encounters:   18 148 lb (67.1 kg)   18 146 lb 9.6 oz (66.5 kg)   18 148 lb (67.1 kg)              We Performed the Following     FLU VACCINE, (RIV4) RECOMBINANT HA  , IM (FluBlok, egg free) [54139]- >18 YRS (FMG recommended  50-64 YRS)     Vaccine Administration, Initial [[id number]]        Primary Care Provider Office Phone # Fax #    Huong Aleah Greenwood [phone] [address] [place] [address]        Equal Access to Services     TRACIE DOWNS AH: Hadii bruce huntero Sofam, waaxda luqadaha, qaybta kaalmayariel fu. So [facility] [phone].    ATENCIÓN: Si habla español, tiene a valadez disposición servicios gratuitos de asistencia lingüística. Mahendra al [phone].    We comply with applicable federal civil rights laws and [place] laws. We do not discriminate on the basis of race, color, national origin, age, disability, sex, sexual orientation, or gender identity.            Thank you!     Thank you for choosing [facility]  for your care. Our goal is always to provide you with excellent care. Hearing back from our patients is one way we can continue to improve our services. Please  take a few minutes to complete the written survey that you may receive in the mail after your visit with us. Thank you!             Your Updated Medication List - Protect others around you: Learn how to safely use, store and throw away your medicines at www.disposemymeds.org.          This list is accurate as of 10/9/18  1:32 PM.  Always use your most recent med list.                   Brand Name Dispense Instructions for use Diagnosis    ACIDOPHILUS PROBIOTIC BLEND Caps      1 OTC tab daily    Encounter for routine adult health examination with abnormal findings       B COMPLEX PO      Take  by mouth. 1 daily        Black Cohosh 540 MG Caps    [facility] BLACK COHOSH     1 tablet daily, uncertain strength    Menopause       CALCIUM & MAGNESIUM CARBONATES PO      Take  by mouth. WITH ZINC:1 TABLET ONCE A DAY        cinnamon 500 MG Caps      Take  by mouth. 1 TABLET ONCE A DAY        FLAX PO      Take 1 capsule by mouth daily        GELATIN      1 ONCE A DAY        GREEN TEA PO      Take  by mouth. 1 TABLET ONCE A DAY        MENOPAUSE RELIEF PO           MOTRIN IB PO      Take 400 mg by mouth every 4 hours as needed for moderate pain        MULTI-ENZYME PO      Take 1 capsule by mouth daily        * MULTIVITAMIN PO      1 tablet daily        * OCUVITE ADULT 50+ Caps      Take 1 capsule daily    Encounter for routine adult health examination without abnormal findings       order for DME     1 Device    Equipment being ordered: EXOS, XS, short arm    Closed nondisplaced fracture of styloid process of right radius, initial encounter       VITAMIN D (CHOLECALCIFEROL) PO      Take 2,000 Units by mouth daily        * Notice:  This list has 2 medication(s) that are the same as other medications prescribed for you. Read the directions carefully, and ask your doctor or other care provider to review them with you.

## 2018-11-02 ENCOUNTER — ALLIED HEALTH/NURSE VISIT (OUTPATIENT)
Dept: FAMILY MEDICINE | Facility: CLINIC | Age: 58
End: 2018-11-02
Payer: COMMERCIAL

## 2018-11-02 DIAGNOSIS — Z23 NEED FOR VACCINATION: Primary | ICD-10-CM

## 2018-11-02 PROCEDURE — 90471 IMMUNIZATION ADMIN: CPT

## 2018-11-02 PROCEDURE — 90750 HZV VACC RECOMBINANT IM: CPT

## 2018-11-02 NOTE — MR AVS SNAPSHOT
"              After Visit Summary   11/2/2018    Mary Carmen Jaime    MRN: [id number]           Patient Information     Date Of Birth          1960        Visit Information        Provider Department      11/2/2018 10:00 AM MARIE CORDON MA [facility]        Today's Diagnoses     Need for vaccination    -  1       Follow-ups after your visit        Your next 10 appointments already scheduled     Nov 05, 2018  1:00 PM CST   Return Visit with Shiv Tyson MD   [facility]    [address] [place] [address] [phone]              Who to contact     If you have questions or need follow up information about today's clinic visit or your schedule please contact [facility] directly at [phone].  Normal or non-critical lab and imaging results will be communicated to you by MyChart, letter or phone within 4 business days after the clinic has received the results. If you do not hear from us within 7 days, please contact the clinic through MyChart or phone. If you have a critical or abnormal lab result, we will notify you by phone as soon as possible.  Submit refill requests through Wis.dm or call your pharmacy and they will forward the refill request to us. Please allow 3 business days for your refill to be completed.          Additional Information About Your Visit        MyChart Information     Wis.dm lets you send messages to your doctor, view your test results, renew your prescriptions, schedule appointments and more. To sign up, go to [email or web address] . Click on \"Log in\" on the left side of the screen, which will take you to the Welcome page. Then click on \"Sign up Now\" on the right side of the page.     You will be asked to enter the access code listed below, as well as some personal information. Please follow the directions to create your username and password.     Your access code is: [email or web address]  Expires: " 2019  1:32 PM     Your access code will  in 90 days. If you need help or a new code, please call your [place] clinic or [phone].        Care EveryWhere ID     This is your Care EveryWhere ID. This could be used by other organizations to access your [place] medical records  [id number]        Your Vitals Were     Last Period                   2013            Blood Pressure from Last 3 Encounters:   18 120/60   18 118/68   18 120/72    Weight from Last 3 Encounters:   18 148 lb (67.1 kg)   18 146 lb 9.6 oz (66.5 kg)   18 148 lb (67.1 kg)              We Performed the Following     1st  Administration  [24268]     SHINGRIX [[id number]]        Primary Care Provider Office Phone # Fax #    Huong Aleah Greenwood [phone]       [address] [place] [address]        Equal Access to Services     [facility]: Hadii aad ku hadasho Soomaali, waaxda luqadaha, qaybta kaalmada adeegyada, waxay idiin hayaan emelina kharadavin groves . So [facility] [phone].    ATENCIÓN: Si habla español, tiene a valadez disposición servicios gratuitos de asistencia lingüística. Llame al [phone].    We comply with applicable federal civil rights laws and [place] laws. We do not discriminate on the basis of race, color, national origin, age, disability, sex, sexual orientation, or gender identity.            Thank you!     Thank you for choosing [facility]  for your care. Our goal is always to provide you with excellent care. Hearing back from our patients is one way we can continue to improve our services. Please take a few minutes to complete the written survey that you may receive in the mail after your visit with us. Thank you!             Your Updated Medication List - Protect others around you: Learn how to safely use, store and throw away your medicines at www.disposemymeds.org.          This list is accurate as of 18 10:08 AM.  Always  use your most recent med list.                   Brand Name Dispense Instructions for use Diagnosis    ACIDOPHILUS PROBIOTIC BLEND Caps      1 OTC tab daily    Encounter for routine adult health examination with abnormal findings       B COMPLEX PO      Take  by mouth. 1 daily        Black Cohosh 540 MG Caps    [employer] BLACK COHOSH     1 tablet daily, uncertain strength    Menopause       CALCIUM & MAGNESIUM CARBONATES PO      Take  by mouth. WITH ZINC:1 TABLET ONCE A DAY        cinnamon 500 MG Caps      Take  by mouth. 1 TABLET ONCE A DAY        FLAX PO      Take 1 capsule by mouth daily        GELATIN      1 ONCE A DAY        GREEN TEA PO      Take  by mouth. 1 TABLET ONCE A DAY        MENOPAUSE RELIEF PO           MOTRIN IB PO      Take 400 mg by mouth every 4 hours as needed for moderate pain        MULTI-ENZYME PO      Take 1 capsule by mouth daily        * MULTIVITAMIN PO      1 tablet daily        * OCUVITE ADULT 50+ Caps      Take 1 capsule daily    Encounter for routine adult health examination without abnormal findings       order for DME     1 Device    Equipment being ordered: EXOS, XS, short arm    Closed nondisplaced fracture of styloid process of right radius, initial encounter       VITAMIN D (CHOLECALCIFEROL) PO      Take 2,000 Units by mouth daily        * Notice:  This list has 2 medication(s) that are the same as other medications prescribed for you. Read the directions carefully, and ask your doctor or other care provider to review them with you.

## 2018-11-05 ENCOUNTER — OFFICE VISIT (OUTPATIENT)
Dept: DERMATOLOGY | Facility: CLINIC | Age: 58
End: 2018-11-05
Payer: COMMERCIAL

## 2018-11-05 DIAGNOSIS — C44.41 BASAL CELL CARCINOMA OF NECK: Primary | ICD-10-CM

## 2018-11-05 PROCEDURE — 99024 POSTOP FOLLOW-UP VISIT: CPT | Performed by: DERMATOLOGY

## 2018-11-05 NOTE — NURSING NOTE
Mary Carmen Jaime's goals for this visit include:   Chief Complaint   Patient presents with     Derm Problem     scar check        She requests these members of her care team be copied on today's visit information: yes    PCP: Huong Greenwood    Referring Provider:  No referring provider defined for this encounter.    LMP 11/01/2013    Do you need any medication refills at today's visit? No    Amorrlucero Mathews CMA

## 2018-11-05 NOTE — MR AVS SNAPSHOT
After Visit Summary   2018    Mary Carmen Jaime    MRN: [id number]           Patient Information     Date Of Birth          1960        Visit Information        Provider Department      2018 1:00 PM Shiv Tyson MD [facility]        Today's Diagnoses     Basal cell carcinoma of neck    -  1       Follow-ups after your visit        Who to contact     If you have questions or need follow up information about today's clinic visit or your schedule please contact [facility] directly at [phone].  Normal or non-critical lab and imaging results will be communicated to you by MyChart, letter or phone within 4 business days after the clinic has received the results. If you do not hear from us within 7 days, please contact the clinic through [employer] or phone. If you have a critical or abnormal lab result, we will notify you by phone as soon as possible.  Submit refill requests through [facility] [facility] or call your pharmacy and they will forward the refill request to us. Please allow 3 business days for your refill to be completed.          Additional Information About Your Visit        MyChart Information     [facility] is an electronic gateway that provides easy, online access to your medical records. With [facility] [facility], you can request a clinic appointment, read your test results, renew a prescription or communicate with your care team.     To sign up for [facility] [facility] visit the website at [email or web address]   You will be asked to enter the access code listed below, as well as some personal information. Please follow the directions to create your username and password.     Your access code is: [email or web address]  Expires: 2019 12:32 PM     Your access code will  in 90 days. If you need help or a new code, please contact your [facility] or call [phone] for assistance.        Care EveryWhere ID     This is your Care EveryWhere ID.  This could be used by other organizations to access your [place] medical records  [id number]        Your Vitals Were     Last Period                   11/01/2013            Blood Pressure from Last 3 Encounters:   06/12/18 120/60   05/02/18 118/68   03/26/18 120/72    Weight from Last 3 Encounters:   06/12/18 67.1 kg (148 lb)   05/02/18 66.5 kg (146 lb 9.6 oz)   03/26/18 67.1 kg (148 lb)              Today, you had the following     No orders found for display       Primary Care Provider Office Phone # Fax #    Huong Aleah Greenwood [phone] [address] [place] [address]        Equal Access to Services     TRACIE DOWNS : Hadii aad ku hadasho Sosevenali, waaxda luqadaha, qaybta kaalmada adeegyada, yariel kaur. So [facility] [phone].    ATENCIÓN: Si habla español, tiene a valadez disposición servicios gratuitos de asistencia lingüística. [facility] [phone].    We comply with applicable federal civil rights laws and [place] laws. We do not discriminate on the basis of race, color, national origin, age, disability, sex, sexual orientation, or gender identity.            Thank you!     Thank you for choosing [facility]  for your care. Our goal is always to provide you with excellent care. Hearing back from our patients is one way we can continue to improve our services. Please take a few minutes to complete the written survey that you may receive in the mail after your visit with us. Thank you!             Your Updated Medication List - Protect others around you: Learn how to safely use, store and throw away your medicines at www.disposemymeds.org.          This list is accurate as of 11/5/18 11:59 PM.  Always use your most recent med list.                   Brand Name Dispense Instructions for use Diagnosis    ACIDOPHILUS PROBIOTIC BLEND Caps      1 OTC tab daily    Encounter for routine adult health examination with abnormal  findings       B COMPLEX PO      Take  by mouth. 1 daily        Black Cohosh 540 MG Caps    [employer] BLACK COHOSH     1 tablet daily, uncertain strength    Menopause       CALCIUM & MAGNESIUM CARBONATES PO      Take  by mouth. WITH ZINC:1 TABLET ONCE A DAY        cinnamon 500 MG Caps      Take  by mouth. 1 TABLET ONCE A DAY        FLAX PO      Take 1 capsule by mouth daily        GELATIN      1 ONCE A DAY        GREEN TEA PO      Take  by mouth. 1 TABLET ONCE A DAY        MENOPAUSE RELIEF PO           MOTRIN IB PO      Take 400 mg by mouth every 4 hours as needed for moderate pain        MULTI-ENZYME PO      Take 1 capsule by mouth daily        * MULTIVITAMIN PO      1 tablet daily        * OCUVITE ADULT 50+ Caps      Take 1 capsule daily    Encounter for routine adult health examination without abnormal findings       order for DME     1 Device    Equipment being ordered: EXOS, XS, short arm    Closed nondisplaced fracture of styloid process of right radius, initial encounter       VITAMIN D (CHOLECALCIFEROL) PO      Take 2,000 Units by mouth daily        * Notice:  This list has 2 medication(s) that are the same as other medications prescribed for you. Read the directions carefully, and ask your doctor or other care provider to review them with you.

## 2018-11-05 NOTE — LETTER
"    11/5/2018         RE: Mary Carmen Jaime  [address]  [place] [address]        Dear Colleague,    Thank you for referring your patient, Mary Carmen Jaime, to the [facility]. Please see a copy of my visit note below.    [facility] Dermatology Post-operative Visit note:  Subjective: The patient follows up for scar evaluation after undergoing Mohs surgery to remove a superficial and nodular basal cell carcinoma from the neck on 7/19/18. The resulting defect was then repaired with a mercedes advancement flap.  At today's visit, the patient reports that the wound has healed well since her last wound check. She feels like the scar has \"loosened\" up. She does note some residual redness and few bumps along the scar. The patient is otherwise feeling well. There are no other skin concerns at this time.  Objective: On exam, there is an appropriately healed surgical site on the neck with no purulence, tenderness or surrounding erythema. Open and closed comedones along the scar. Open depression with suture material visible in the center.   Assessment and Plan: Appropriately healed surgical site without any signs of infection    Discontinue wound care; okay to resume normal skin care routine.     Milia and one suture granuloma were nicked with 11 blade and contents expressed.     Reassured that pink coloration will continue to fade for up to 1 year to 18 months post-surgery.    Discussed options for scar revision such as dermabrasion; patient defers treatment for now.     Recommended sunscreens SPF #50 or greater and protective clothing. Risk of scar dyspigmentation discussed.     Continue periodic self skin exams and report of any new or changing lesions.     Patient declined follow up with nursing staff or general dermatology. She elected to schedule with primary care for any new or additional concerns.     Please refer to previous clinic note for details regarding " treatment.  Follow up as needed.      Staff:    Scribe Disclosure  I, Jamil Yg, am serving as a scribe to document services personally performed by Dr. Shiv Tyson DO, based on data collection and the provider's statements to me.     Provider Disclosure:   The documentation recorded by the scribe accurately reflects the services I personally performed and the decisions made by me.    Shiv Tyson DO    Department of Dermatology  [facility]: Phone: [phone], Fax:[phone]  [facility]: Phone: [phone], Fax: [phone]      Again, thank you for allowing me to participate in the care of your patient.        Sincerely,        Shiv Tyson MD

## 2018-11-05 NOTE — PROGRESS NOTES
"[facility] Dermatology Post-operative Visit note:  Subjective: The patient follows up for scar evaluation after undergoing Mohs surgery to remove a superficial and nodular basal cell carcinoma from the neck on 7/19/18. The resulting defect was then repaired with a mercedes advancement flap.  At today's visit, the patient reports that the wound has healed well since her last wound check. She feels like the scar has \"loosened\" up. She does note some residual redness and few bumps along the scar. The patient is otherwise feeling well. There are no other skin concerns at this time.  Objective: On exam, there is an appropriately healed surgical site on the neck with no purulence, tenderness or surrounding erythema. Open and closed comedones along the scar. Open depression with suture material visible in the center.   Assessment and Plan: Appropriately healed surgical site without any signs of infection    Discontinue wound care; okay to resume normal skin care routine.     Milia and one suture granuloma were nicked with 11 blade and contents expressed.     Reassured that pink coloration will continue to fade for up to 1 year to 18 months post-surgery.    Discussed options for scar revision such as dermabrasion; patient defers treatment for now.     Recommended sunscreens SPF #50 or greater and protective clothing. Risk of scar dyspigmentation discussed.     Continue periodic self skin exams and report of any new or changing lesions.     Patient declined follow up with nursing staff or general dermatology. She elected to schedule with primary care for any new or additional concerns.     Please refer to previous clinic note for details regarding treatment.  Follow up as needed.      Staff:    Scribe Disclosure  I, Jamil Ronquillo, am serving as a scribe to document services personally performed by Dr. Shiv Tyson DO, based on data collection and the provider's statements to me.     Provider " Disclosure:   The documentation recorded by the scribe accurately reflects the services I personally performed and the decisions made by me.    Shiv Tyson DO    Department of Dermatology  [facility]: Phone: [phone], Fax:[phone]  [facility]: Phone: [phone], Fax: [phone]

## 2018-12-24 ENCOUNTER — OFFICE VISIT (OUTPATIENT)
Dept: FAMILY MEDICINE | Facility: OTHER | Age: 58
End: 2018-12-24
Payer: COMMERCIAL

## 2018-12-24 VITALS
OXYGEN SATURATION: 100 % | TEMPERATURE: 98.5 F | HEIGHT: 62 IN | HEART RATE: 57 BPM | SYSTOLIC BLOOD PRESSURE: 132 MMHG | RESPIRATION RATE: 16 BRPM | WEIGHT: 152 LBS | DIASTOLIC BLOOD PRESSURE: 72 MMHG | BODY MASS INDEX: 27.97 KG/M2

## 2018-12-24 DIAGNOSIS — J32.9 BACTERIAL SINUSITIS: ICD-10-CM

## 2018-12-24 DIAGNOSIS — B96.89 BACTERIAL SINUSITIS: ICD-10-CM

## 2018-12-24 DIAGNOSIS — J02.9 SORE THROAT: Primary | ICD-10-CM

## 2018-12-24 LAB
DEPRECATED S PYO AG THROAT QL EIA: NORMAL
SPECIMEN SOURCE: NORMAL

## 2018-12-24 PROCEDURE — 99213 OFFICE O/P EST LOW 20 MIN: CPT | Performed by: NURSE PRACTITIONER

## 2018-12-24 PROCEDURE — 87081 CULTURE SCREEN ONLY: CPT | Performed by: NURSE PRACTITIONER

## 2018-12-24 PROCEDURE — 87880 STREP A ASSAY W/OPTIC: CPT | Performed by: NURSE PRACTITIONER

## 2018-12-24 RX ORDER — AZITHROMYCIN 250 MG/1
TABLET, FILM COATED ORAL
Qty: 6 TABLET | Refills: 0 | Status: SHIPPED | OUTPATIENT
Start: 2018-12-24 | End: 2020-01-07

## 2018-12-24 RX ORDER — AZITHROMYCIN 250 MG/1
TABLET, FILM COATED ORAL
Qty: 6 TABLET | Refills: 0 | Status: SHIPPED | OUTPATIENT
Start: 2018-12-24 | End: 2018-12-24

## 2018-12-24 ASSESSMENT — MIFFLIN-ST. JEOR: SCORE: 1214.78

## 2018-12-24 ASSESSMENT — PAIN SCALES - GENERAL: PAINLEVEL: MODERATE PAIN (4)

## 2018-12-24 NOTE — PROGRESS NOTES
SUBJECTIVE:   Mary Carmen Jaime is a 58 year old female who presents to clinic today for the following health issues:      HPI  Acute Illness   Acute illness concerns: sore throat, ear pain, left eye problem  Onset: 1 week ago    Fever: no    Chills/Sweats: no     Headache (location?): no     Sinus Pressure:taking tylenol    Conjunctivitis:  YES: left    Ear Pain: YES: bilateral    Rhinorrhea: YES    Congestion: YES    Sore Throat: YES     Cough: YES    Wheeze: no     Decreased Appetite: YES    Nausea: no     Vomiting: no     Diarrhea:  no     Dysuria/Freq.: no     Fatigue/Achiness: YES    Sick/Strep Exposure: YES     Therapies Tried and outcome: tylenol severe cold medicine    States headache, feverish, sinus pressure R>L. Throat pain worse in am. Eye crusty this am denies pruritis, pain or irritation of eye L.     Problem list and histories reviewed & adjusted, as indicated.  Additional history: as documented    Patient Active Problem List   Diagnosis     Rosacea     FAMILY HX CONDITION NEC     CARDIOVASCULAR SCREENING; LDL GOAL LESS THAN 160     Advanced directives, counseling/discussion     Trigger finger of right thumb     Bilateral carpal tunnel syndrome     Left carpal tunnel syndrome     Past Surgical History:   Procedure Laterality Date     C APPENDECTOMY  1996     COLONOSCOPY  1/10/2011    normal repeat 10 years     LAPAROSCOPIC TUBAL LIGATION  3/9/2012    Procedure:LAPAROSCOPIC TUBAL LIGATION; Laparoscopic Tubal; Surgeon:RUPAL FREEMAN; Location:[facility] OR     RELEASE CARPAL TUNNEL Right 6/24/2016    Procedure: RELEASE CARPAL TUNNEL;  Surgeon: Parmjit Peña DO;  Location: [facility] OR     RELEASE CARPAL TUNNEL Left 10/21/2016    Procedure: RELEASE CARPAL TUNNEL;  Surgeon: Parmjit Peña DO;  Location: [facility] OR     RELEASE TRIGGER FINGER Right 6/24/2016    Procedure: RELEASE TRIGGER FINGER;  Surgeon: Parmjit Peña DO;  Location: [facility] OR       Social History     Tobacco Use     Smoking  status: Never Smoker     Smokeless tobacco: Never Used   Substance Use Topics     Alcohol use: Yes     Alcohol/week: 0.0 oz     Comment: 0-3 drinks per week     Family History   Problem Relation Age of Onset     Diabetes Father         AOD     Cancer Father         salivary glands     Heart Disease Father         1st MI in 40's     Diabetes Sister         JOD     Cancer Paternal Uncle         Prostate     Genitourinary Problems Sister         kidney abnormality, hypertrophy and rupture         Current Outpatient Medications   Medication Sig Dispense Refill     azithromycin (ZITHROMAX Z-MATT) 250 MG tablet Two tablets first day, then one tablet daily for four days 6 tablet 0     B Complex Vitamins (B COMPLEX PO) Take  by mouth. 1 daily       Black Cohosh ([employer]S BLACK COHOSH) 540 MG CAPS 1 tablet daily, uncertain strength       CALCIUM & MAGNESIUM CARBONATES PO Take  by mouth. WITH ZINC:1 TABLET ONCE A DAY       Cinnamon 500 MG CAPS Take  by mouth. 1 TABLET ONCE A DAY       Digestive Enzymes (MULTI-ENZYME PO) Take 1 capsule by mouth daily        Flaxseed, Linseed, (FLAX PO) Take 1 capsule by mouth daily        GELATIN 1 ONCE A DAY       Green Tea, Camillia sinensis, (GREEN TEA PO) Take  by mouth. 1 TABLET ONCE A DAY       MOTRIN IB PO Take 400 mg by mouth every 4 hours as needed for moderate pain       Multiple Vitamins-Minerals (OCUVITE ADULT 50+) CAPS Take 1 capsule daily       MULTIVITAMIN OR 1 tablet daily       order for DME Equipment being ordered: EXOS, XS, short arm 1 Device 0     Probiotic Product (ACIDOPHILUS PROBIOTIC BLEND) CAPS 1 OTC tab daily       Specialty Vitamins Products (MENOPAUSE RELIEF PO)        VITAMIN D, CHOLECALCIFEROL, PO Take 2,000 Units by mouth daily       Allergies   Allergen Reactions     Cephalosporins Rash     Amoxicillin Rash     Penicillins Rash     BP Readings from Last 3 Encounters:   12/24/18 132/72   06/12/18 120/60   05/02/18 118/68    Wt Readings from Last 3 Encounters:  "  12/24/18 68.9 kg (152 lb)   06/12/18 67.1 kg (148 lb)   05/02/18 66.5 kg (146 lb 9.6 oz)                  Labs reviewed in EPIC    ROS:  Constitutional, HEENT, cardiovascular, pulmonary, gi and gu systems are negative, except as otherwise noted.    OBJECTIVE:     /72   Pulse 57   Temp 98.5  F (36.9  C) (Temporal)   Resp 16   Ht 1.562 m (5' 1.5\")   Wt 68.9 kg (152 lb)   LMP 11/01/2013   SpO2 100%   BMI 28.26 kg/m    Body mass index is 28.26 kg/m .  GENERAL: alert, no distress and fatigued  EYES: Eyes grossly normal to inspection, PERRL, EOMI and conjunctiva/corneas- conjunctival injection OS  HENT: normal cephalic/atraumatic, both ears: erythematous, nose and mouth without ulcers or lesions, nasal mucosa edematous , rhinorrhea yellow, oropharynx clear, oral mucous membranes moist, tonsillar erythema and sinuses: maxillary, frontal tenderness on right  NECK: bilateral anterior cervical adenopathy, no asymmetry, masses, or scars and thyroid normal to palpation  RESP: lungs clear to auscultation - no rales, rhonchi or wheezes  CV: regular rate and rhythm, normal S1 S2, no S3 or S4, no murmur, click or rub, no peripheral edema and peripheral pulses strong  ABDOMEN: soft, nontender, no hepatosplenomegaly, no masses and bowel sounds normal  SKIN: no suspicious lesions or rashes    Diagnostic Test Results:  Results for orders placed or performed in visit on 12/24/18 (from the past 24 hour(s))   Strep, Rapid Screen   Result Value Ref Range    Specimen Description Throat     Rapid Strep A Screen       NEGATIVE: No Group A streptococcal antigen detected by immunoassay, await culture report.       ASSESSMENT/PLAN:     1. Sore throat  Negative pending culture  - Strep, Rapid Screen  - Beta strep group A culture    2. Bacterial sinusitis  Recommend treatment as prescribed if symptoms of left eye worsens she will contact me on Weds. Discussed likely due to current infection.   - azithromycin (ZITHROMAX Z-MATT) 250 " MG tablet; Two tablets first day, then one tablet daily for four days  Dispense: 6 tablet; Refill: 0    Patient Instructions   Please call on Weds. If symptoms of eye drainage worsens or changes if symptoms worsen may need eye drops. However likely due to viral infection or sinus pressure and drainage.     I have prescribed an antibiotic for you today. You will take 2 tablets today and then 1 daily for a total of 6 days daily with food. Please take a probiotic or yogurt while on this medication as this will help protect the good bacteria in your colon. Drink plenty of fluids. Use normal saline in your sinuses as directed to help with the nasal congestion.I also recommend a nightly humidifier if you have one available.    If symptoms are not improving with treatment plan please return to clinic for further evaluation.    Thank you  Bailee Angeles [facility]

## 2018-12-24 NOTE — PATIENT INSTRUCTIONS
Please call on Weds. If symptoms of eye drainage worsens or changes if symptoms worsen may need eye drops. However likely due to viral infection or sinus pressure and drainage.     I have prescribed an antibiotic for you today. You will take 2 tablets today and then 1 daily for a total of 6 days daily with food. Please take a probiotic or yogurt while on this medication as this will help protect the good bacteria in your colon. Drink plenty of fluids. Use normal saline in your sinuses as directed to help with the nasal congestion.I also recommend a nightly humidifier if you have one available.    If symptoms are not improving with treatment plan please return to clinic for further evaluation.    Thank you  Bailee Angeles CNP

## 2018-12-26 LAB
BACTERIA SPEC CULT: NORMAL
SPECIMEN SOURCE: NORMAL

## 2018-12-26 NOTE — RESULT ENCOUNTER NOTE
Results discussed with patient in clinic. States understanding of these results.    Bailee Angeles CNP

## 2019-03-11 ENCOUNTER — HOSPITAL ENCOUNTER (OUTPATIENT)
Dept: MAMMOGRAPHY | Facility: CLINIC | Age: 59
Discharge: HOME | End: 2019-03-11
Attending: FAMILY MEDICINE | Admitting: FAMILY MEDICINE
Payer: COMMERCIAL

## 2019-03-11 DIAGNOSIS — Z12.31 VISIT FOR SCREENING MAMMOGRAM: ICD-10-CM

## 2019-03-11 PROCEDURE — 77067 SCR MAMMO BI INCL CAD: CPT

## 2019-10-02 ENCOUNTER — IMMUNIZATION (OUTPATIENT)
Dept: FAMILY MEDICINE | Facility: CLINIC | Age: 59
End: 2019-10-02
Payer: COMMERCIAL

## 2019-10-02 PROCEDURE — 90682 RIV4 VACC RECOMBINANT DNA IM: CPT

## 2019-10-02 PROCEDURE — 90471 IMMUNIZATION ADMIN: CPT

## 2020-01-07 ENCOUNTER — OFFICE VISIT (OUTPATIENT)
Dept: FAMILY MEDICINE | Facility: CLINIC | Age: 60
End: 2020-01-07
Payer: COMMERCIAL

## 2020-01-07 VITALS
OXYGEN SATURATION: 99 % | BODY MASS INDEX: 29.28 KG/M2 | DIASTOLIC BLOOD PRESSURE: 70 MMHG | HEART RATE: 84 BPM | HEIGHT: 61 IN | TEMPERATURE: 99.5 F | WEIGHT: 155.1 LBS | RESPIRATION RATE: 20 BRPM | SYSTOLIC BLOOD PRESSURE: 118 MMHG

## 2020-01-07 DIAGNOSIS — R10.32 LLQ ABDOMINAL PAIN: Primary | ICD-10-CM

## 2020-01-07 DIAGNOSIS — R50.9 FEVER IN ADULT: ICD-10-CM

## 2020-01-07 PROCEDURE — 99213 OFFICE O/P EST LOW 20 MIN: CPT | Performed by: PHYSICIAN ASSISTANT

## 2020-01-07 RX ORDER — CIPROFLOXACIN 500 MG/1
500 TABLET, FILM COATED ORAL 2 TIMES DAILY
Qty: 14 TABLET | Refills: 0 | Status: SHIPPED | OUTPATIENT
Start: 2020-01-07 | End: 2020-01-28

## 2020-01-07 RX ORDER — METRONIDAZOLE 500 MG/1
500 TABLET ORAL 3 TIMES DAILY
Qty: 21 TABLET | Refills: 0 | Status: SHIPPED | OUTPATIENT
Start: 2020-01-07 | End: 2020-01-28

## 2020-01-07 ASSESSMENT — PAIN SCALES - GENERAL: PAINLEVEL: EXTREME PAIN (8)

## 2020-01-07 ASSESSMENT — MIFFLIN-ST. JEOR: SCORE: 1219.87

## 2020-01-07 NOTE — PATIENT INSTRUCTIONS
Patient Education     Unknown Causes of Abdominal Pain (Female)    The exact cause of your belly (abdominal) pain is not clear. This does not mean that this is something to worry about. Everyone likes to know the exact cause of the problem. But sometimes with belly pain, there is no clear-cut cause, and this could be a good thing. The good news is that your symptoms can be treated, and you will feel better.   Your condition does not seem serious now. But sometimes the signs of a serious problem may take more time to appear. For this reason, it is important for you to watch for any new symptoms, problems, or worsening of your condition.  Over the next few days, the abdominal pain may come and go. Or it may be constant. Other common symptoms can include nausea and vomiting. Sometimes it can be difficult to tell if you feel nauseous. You may just feel bad and not connect that feeling to nausea. Constipation, diarrhea, and a fever may go along with the pain.  The pain may continue even if treated correctly over the following days. Depending on how things go, sometimes the cause can become clear and may need more or different treatment. Additional evaluations, medicines, or tests may also be needed.  Home care  Your healthcare provider may prescribe medicine for pain, symptoms, or an infection.  Follow the healthcare provider's instructions for taking these medicines.  General care    Rest as much as you can until your next exam. No strenuous activities.    Try to find positions that ease discomfort. A small pillow placed on the abdomen may help relieve pain.    Something warm on your abdomen (such as a heating pad) may help, but be careful not to burn yourself.  Diet    Don t force yourself to eat, especially if having cramps, vomiting, or diarrhea.    Water is important so you don't get dehydrated. Soup may also be good. Sports drinks may also help, especially if they are not too acidic. Don't drink sugary drinks as  this can make things worse. Take liquids in small amounts. Don t guzzle them.    Caffeine sometimes makes the pain and cramping worse.    Don t take dairy products if you have vomiting or diarrhea.    Don't eat large amounts at a time. Wait a few minutes between bites.    Eat a diet low in fiber (called a low-residue diet). Foods allowed include refined breads, white rice, fruit and vegetable juices without pulp, tender meats. These foods will pass more easily through the intestine.    Don t have whole-grain foods, whole fruits and vegetables, meats, seeds and nuts, fried or fatty foods, dairy, alcohol and spicy foods until your symptoms go away.  Follow-up care  Follow up with your healthcare provider, or as advised, if your pain does not begin to improve in the next 24 hours.  Call 911  Call 911 if any of these occur:    Trouble breathing    Confusion    Fainting or loss of consciousness    Rapid heart rate    Seizure  When to seek medical advice  Call your healthcare provider right away if any of these occur:    Pain gets worse or moves to the right lower abdomen    New or worsening vomiting or diarrhea    Swelling of the abdomen    Unable to pass stool for more than 3 days    Fever of 100.4 F (38 C) or higher, or as directed by your healthcare provider.    Blood in vomit or bowel movements (dark red or black color)    Yellow color of eyes and skin (jaundice)    Weakness, dizziness    Chest, arm, back, neck, or jaw pain    Unexpected vaginal bleeding or missed period    Can't keep down liquids or water and you are getting dehydrated  Date Last Reviewed: 6/1/2018 2000-2019 [employer]. [address] [place] [address], [place] [address]. All rights reserved. This information is not intended as a substitute for professional medical care. Always follow your healthcare professional's instructions.

## 2020-01-07 NOTE — NURSING NOTE
Health Maintenance Due   Topic Date Due     HEPATITIS C SCREENING  1960     HIV SCREENING  09/22/1975     ADVANCE CARE PLANNING  01/11/2017     PREVENTIVE CARE VISIT  05/02/2019     PHQ-2  01/01/2020     PAP  04/05/2020     Tanvi GRUBER LPN

## 2020-01-07 NOTE — PROGRESS NOTES
"Subjective     Mary Carmen Jaime is a 59 year old female who presents to clinic today for the following health issues:    HPI   ABDOMINAL   PAIN     Onset: yesterday    Description:   Character: Sharp, dull ache  Location: left lower quadrant  Radiation: None    Intensity: mild, moderate    Progression of Symptoms:  worsening    Accompanying Signs & Symptoms:  Fever/Chills?: no   Gas/Bloating: no   Nausea: no   Vomitting: no   Diarrhea?: no   Constipation:no   Dysuria or Hematuria: no    History:   Trauma: no   Previous similar pain: YES- many years ago   Previous tests done: none    Precipitating factors:   Does the pain change with:     Food: no      BM: no     Urination: no     Alleviating factors:  nothing    Therapies Tried and outcome: nothing    LMP:  not applicable     Patient is a 59 year old female who presents with 24 hours of LLQ pain. She said that the pain started Sunday night as a sharp constant pain, rated at 8/10. She said that the pain will become dull if she sits in the correct positions. Associated symptoms listed above. Denies chest pain, trouble breathing, abdominal pain, bloody stool, vaginal discharge, rash or recent illness. Some hard/loose stools off/on, but denies ongoing problem.     Reviewed and updated as needed this visit by Provider         Review of Systems   ROS COMP: Constitutional, HEENT, cardiovascular, pulmonary, gi and gu systems are negative, except as otherwise noted.      Objective    /70 (BP Location: Right arm, Patient Position: Chair, Cuff Size: Adult Large)   Pulse 84   Temp 99.5  F (37.5  C) (Oral)   Resp 20   Ht 1.556 m (5' 1.25\")   Wt 70.4 kg (155 lb 1.6 oz)   LMP 11/01/2013   SpO2 99%   BMI 29.07 kg/m    Body mass index is 29.07 kg/m .  Physical Exam   GENERAL: healthy, alert and no distress  EYES: Eyes grossly normal to inspection, PERRL and conjunctivae and sclerae normal  HENT: ear canals and TM's normal, nose and mouth without ulcers or lesions  NECK: " no adenopathy, no asymmetry, masses, or scars and thyroid normal to palpation  RESP: lungs clear to auscultation - no rales, rhonchi or wheezes  CV: regular rate and rhythm, normal S1 S2, no S3 or S4, no murmur, click or rub, no peripheral edema and peripheral pulses strong  ABDOMEN: soft, tender to LLQ, no adnexal tenderness, no hepatosplenomegaly, no masses and bowel sounds normal    Diagnostic Test Results:  Labs reviewed in Epic        Assessment & Plan       ICD-10-CM    1. LLQ abdominal pain R10.32 ciprofloxacin (CIPRO) 500 MG tablet     metroNIDAZOLE (FLAGYL) 500 MG tablet   2. Fever in adult R50.9 ciprofloxacin (CIPRO) 500 MG tablet     metroNIDAZOLE (FLAGYL) 500 MG tablet      Patient will start treatment for suspected diverticulitis. Reviewed alternative causes for abdominal pain. If not improving as expected patient will call to update on symptoms.     Return in about 6 months (around 7/7/2020) for Return for scheduled annual checkup with PCP.    Qamar Washington PA-C  [facility]

## 2020-01-27 ENCOUNTER — NURSE TRIAGE (OUTPATIENT)
Dept: FAMILY MEDICINE | Facility: OTHER | Age: 60
End: 2020-01-27

## 2020-01-27 NOTE — TELEPHONE ENCOUNTER
Reason for call:  Patient reporting a symptom    Symptom or request: blood in stool - would like to see if Qamar Washington could see her tomorrow in [place]. Not sure if ok to schedule in any of his spots tomorrow. Pt was going to walk down to the Emergency Department to see if ok to wait     Duration (how long have symptoms been present): blood in stool x 1 day, diarrhea x 1 week     Have you been treated for this before? No    Additional comments: pt was seen on 1/7/2020 for abd pain was given an antibiotic for 1 week was good for 5 days after the med is gone.  Then diarrhea started and now blood in stool - pt was aware provider not in clinic today     Phone Number patient can be reached at:  Home number on file [phone] (home)    Best Time:  Anytime     Can we leave a detailed message on this number:  YES    Call taken on 1/27/2020 at 3:22 PM by Suki Higginbotham

## 2020-01-27 NOTE — TELEPHONE ENCOUNTER
Will route to Qamar to review. Patient wondering if you can see her tomorrow. Triage attempted to call for more information, no answer.     Attempted to call patient, no answer. Left message for a return call to the clinic when available.     ZEKE Jacinto, RN  [facility]

## 2020-01-27 NOTE — TELEPHONE ENCOUNTER
"S-(situation): On-going diarrhea and abnormal looking stools since 1/19/20. She thinks there might be blood in them now as the stools look more orange.     B-(background): she did [place] a [place] and got home just before Christmas.   A-(assessment): abnormal looking stools with unknown etiology     R-(recommendations): Needs to be seen within 2 to 3 days. Scheduled to see Qamar Sanchez tomorrow, 1/28/20,  at 3: 40 PM.............VALERIO Luis      Reason for Disposition    [1] Abnormal color is unexplained AND [2] persists > 24 hours    Answer Assessment - Initial Assessment Questions  1. COLOR: \"What color is it?\" \"Is that color in part or all of the stool?\"      Her stools are mixed with blood,   2. ONSET: \"When was the unusual color first noted?\"      She has had diarrhea sx for a week now.  Started last Sunday, 1/19/20., but didn't see blood until today.  Up to 12 stools per day.   3. CAUSE: \"Have you eaten any food or taken any medicine of this color?\" (See listing in BACKGROUND)      She has been eating OK.   4. OTHER SYMPTOMS: \"Do you have any other symptoms?\" (e.g., diarrhea, jaundice, abdominal pain, fever).      Only has abdominal pain when she feels like she has to have a BM.  Once she goes she will feel better.     NO fever. Yes, to diarrhea. Traveled to the [facility] in December on a cruise ship. They had several ports of call. They got back just before Christmas. They drank bottled water.    Protocols used: STOOLS - UNUSUAL COLOR-A-AH    "

## 2020-01-28 ENCOUNTER — OFFICE VISIT (OUTPATIENT)
Dept: FAMILY MEDICINE | Facility: CLINIC | Age: 60
End: 2020-01-28
Payer: COMMERCIAL

## 2020-01-28 VITALS
HEIGHT: 63 IN | HEART RATE: 68 BPM | TEMPERATURE: 98.6 F | SYSTOLIC BLOOD PRESSURE: 116 MMHG | OXYGEN SATURATION: 98 % | WEIGHT: 150.5 LBS | BODY MASS INDEX: 26.67 KG/M2 | RESPIRATION RATE: 16 BRPM | DIASTOLIC BLOOD PRESSURE: 74 MMHG

## 2020-01-28 DIAGNOSIS — R19.7 DIARRHEA OF PRESUMED INFECTIOUS ORIGIN: Primary | ICD-10-CM

## 2020-01-28 DIAGNOSIS — R10.2 SUPRAPUBIC PAIN: ICD-10-CM

## 2020-01-28 LAB
ALBUMIN UR-MCNC: NEGATIVE MG/DL
APPEARANCE UR: CLEAR
BILIRUB UR QL STRIP: NEGATIVE
COLOR UR AUTO: YELLOW
GLUCOSE UR STRIP-MCNC: NEGATIVE MG/DL
HGB UR QL STRIP: NEGATIVE
KETONES UR STRIP-MCNC: NEGATIVE MG/DL
LEUKOCYTE ESTERASE UR QL STRIP: NEGATIVE
NITRATE UR QL: NEGATIVE
PH UR STRIP: 5 PH (ref 5–7)
SOURCE: NORMAL
SP GR UR STRIP: 1.01 (ref 1–1.03)
UROBILINOGEN UR STRIP-MCNC: 0 MG/DL (ref 0–2)

## 2020-01-28 PROCEDURE — 81003 URINALYSIS AUTO W/O SCOPE: CPT | Performed by: PHYSICIAN ASSISTANT

## 2020-01-28 PROCEDURE — 99213 OFFICE O/P EST LOW 20 MIN: CPT | Performed by: PHYSICIAN ASSISTANT

## 2020-01-28 RX ORDER — LOPERAMIDE HYDROCHLORIDE 2 MG/1
2 TABLET ORAL 4 TIMES DAILY PRN
Qty: 20 TABLET | Refills: 0 | Status: SHIPPED | OUTPATIENT
Start: 2020-01-28

## 2020-01-28 ASSESSMENT — PAIN SCALES - GENERAL: PAINLEVEL: MILD PAIN (2)

## 2020-01-28 ASSESSMENT — MIFFLIN-ST. JEOR: SCORE: 1218.85

## 2020-01-28 NOTE — NURSING NOTE
Health Maintenance Due   Topic Date Due     HEPATITIS C SCREENING  1960     HIV SCREENING  09/22/1975     ADVANCE CARE PLANNING  01/11/2017     PREVENTIVE CARE VISIT  05/02/2019     HPV TEST  04/05/2020     PAP  04/05/2020     Tanvi GRUBER LPN    Patient will call to schedule when due.

## 2020-01-28 NOTE — PROGRESS NOTES
Subjective     Mary Carmen Jaime is a 59 year old female who presents to clinic today for the following health issues:    HPI   Diarrhea  Onset: 1 week    Description:   Consistency of stool: loose  Blood in stool: YES  Number of loose stools in past 24 hours: 8    Progression of Symptoms:  improving    Accompanying Signs & Symptoms:  Fever: no   Nausea or vomiting; no   Abdominal pain: YES  Episodes of constipation: no   Weight loss: no     History:   Ill contacts: no   Recent use of antibiotics: YES   Recent travels: YES- December         Recent medication-new or changes(Rx or OTC): no     Precipitating factors:   Eating a lot of food    Alleviating factors:   nothing    Therapies Tried and outcome:  Stool softner; ; Outcome: no relief    Patient is a 59 year old female who presents for follow up of lower left quadrant pain. She was seen in clinic on 01/07/2020 and treated for suspected diverticulitis. Today she says that the left lower quadrant pain has improved, but she has been experiencing loose stools for past week following completion of antibiotic. Estimates that she is having bowel movement 4-8x/day. Denies fever, blood or mucus in stool, abdominal pain, vaginal discharge, rash, chest pain, trouble breathing, headache, dizziness, low energy. Patient tried a stool softener for her diarrhea without improvement. I discouraged use of this medication for already soft/liquid stools in the future. Patient did recall [place], but was not able to recall whether she ate undercooked, raw or questionable food.     Reviewed and updated as needed this visit by Provider         Review of Systems   ROS COMP: Constitutional, HEENT, cardiovascular, pulmonary, gi and gu systems are negative, except as otherwise noted.      Objective    /74 (BP Location: Right arm, Patient Position: Chair, Cuff Size: Adult Large)   Pulse 68   Temp 98.6  F (37  C) (Oral)   Resp 16   Ht 1.588 m (5'  "2.5\")   Wt 68.3 kg (150 lb 8 oz)   LMP 11/01/2013   SpO2 98%   BMI 27.09 kg/m    Body mass index is 27.09 kg/m .  Physical Exam   GENERAL: healthy, alert and no distress  RESP: lungs clear to auscultation - no rales, rhonchi or wheezes  CV: regular rate and rhythm, normal S1 S2, no S3 or S4, no murmur, click or rub, no peripheral edema and peripheral pulses strong  ABDOMEN: soft, tenderness to suprapubic, LLQ tenderness has resolved, no hepatosplenomegaly, no masses and bowel sounds normal, negative CVA tenderness  MS: no gross musculoskeletal defects noted, no edema  PSYCH: mentation appears normal, affect normal/bright    Diagnostic Test Results:  Results for orders placed or performed in visit on 01/28/20   *UA reflex to Microscopic and Culture ([place]; [facility]; [facility]; [facility]; [facility]; [facility]; [place]; [place])     Status: None   Result Value Ref [place]    Color Urine Yellow     Appearance Urine Clear     Glucose Urine Negative NEG^Negative mg/dL    Bilirubin Urine Negative NEG^Negative    Ketones Urine Negative NEG^Negative mg/dL    Specific Gravity Urine 1.006 1.003 - 1.035    Blood Urine Negative NEG^Negative    pH Urine 5.0 5.0 - 7.0 pH    Protein Albumin Urine Negative NEG^Negative mg/dL    Urobilinogen mg/dL 0.0 0.0 - 2.0 mg/dL    Nitrite Urine Negative NEG^Negative    Leukocyte Esterase Urine Negative NEG^Negative    Source Unspecified Urine            Assessment & Plan       ICD-10-CM    1. Diarrhea of presumed infectious origin R19.7 Clostridium difficile Toxin B PCR     Enteric Bacteria and Virus Panel by RAFAEL Stool     Ova and Parasite Exam Routine     loperamide (IMODIUM A-D) 2 MG tablet   2. Suprapubic pain R10.2 *UA reflex to Microscopic and Culture ([place]; [facility]; [facility]; [facility]; [facility]; [facility]; [place]; [place])     CANCELED: UA reflex to Microscopic and Culture      Discussed with the patient I am suspicious of " c difficile infection given recent antibiotic use vs alternative infectious cause with her recent travel. I will have the patient complete stool cultures and treat accordingly. Suprapubic pain noted on exam, UA returned without evidence of infection. Educational information sent home with the patient.       Return in about 6 months (around 7/28/2020) for Return for scheduled annual checkup with PCP.    Qamar Washington PA-C  [facility]

## 2020-01-28 NOTE — LETTER
[address] [address]  [phone]        January 28, 2020    Mary Carmen Jaime  [id number] [address] [facility]  [place] [address]          Dear Mary Carmen,      The results of your urinalysis returned without finding for infection. Please submit the stool samples as discussed at your appointment.   Sincerely,        Qamar Washington PA-C

## 2020-01-28 NOTE — PATIENT INSTRUCTIONS
Patient Education     Diarrhea with Uncertain Cause (Adult)    Diarrhea is when stools are loose and watery. This can be caused by:    Viral infections    Bacterial infections    Food poisoning    Parasites    Irritable bowel syndrome (IBS)    Inflammatory bowel diseases such as ulcerative colitis, Crohn's disease, and celiac disease    Food intolerance, such as to lactose, the sugar found in milk and milk products    Reaction to medicines like antibiotics, laxatives, cancer drugs, and antacids  Along with diarrhea, you may also have:    Abdominal pain and cramping    Nausea and vomiting    Loss of bowel control    Fever and chills    Bloody stools  In some cases, antibiotics may help to treat diarrhea. You may have a stool sample test. This is done to see what is causing your diarrhea, and if antibiotics will help treat it. The results of a stool sample test may take up to 2 days. The healthcare provider may not give you antibiotics until he or she has the stool test results.  Diarrhea can cause dehydration. This is the loss of too much water and other fluids from the body. When this occurs, body fluid must be replaced. This can be done with oral rehydration solutions. Oral rehydration solutions are available at drugstores and grocery stores without a prescription. Sports drinks are not the best choice if you are very dehydrated. They have too much sugar and not enough electrolytes.  Home care  Follow all instructions given by your healthcare provider. Rest at home for the next 24 hours, or until you feel better. Avoid caffeine, tobacco, and alcohol. These can make diarrhea, cramping, and pain worse.  If taking medicines:    Over-the-counter nausea and diarrhea medicines are generally OK unless you experience fever or blood stool. Check with your doctor first in those circumstances.    You may use acetaminophen or NSAID medicines like ibuprofen or naproxen to reduce pain and fever. Don t use these if you have  chronic liver or kidney disease, or ever had a stomach ulcer or gastrointestinal bleeding. Don't use NSAID medicines if you are already taking one for another condition (like arthritis) or are on daily aspirin therapy (such as for heart disease or after a stroke). Talk with your healthcare provider first.    If antibiotics were prescribed, be sure you take them until they are finished. Don t stop taking them even when you feel better. Antibiotics must be taken as a full course.  To prevent the spread of illness:    Remember that washing with soap and water and using alcohol-based  is the best way to prevent the spread of infection. Dry your hands with a single use towel (like a paper towel).    Clean the toilet after each use.    Wash your hands before eating.    Wash your hands before and after preparing food. Keep in mind that people with diarrhea or vomiting should not prepare food for others.    Wash your hands after using cutting boards, countertops, and knives that have been in contact with raw foods.    Wash and then peel fruits and vegetables.    Keep uncooked meats away from cooked and ready-to-eat foods.    Use a food thermometer when cooking. Cook poultry to at least 165 F (74 C). Cook ground meat (beef, veal, pork, lamb) to at least 160 F (71 C). Cook fresh beef, veal, lamb, and pork to at least 145 F (63 C).    Don t eat raw or undercooked eggs (poached or joselito side up), poultry, meat, or unpasteurized milk and juices.  Food and drinks  The main goal while treating vomiting or diarrhea is to prevent dehydration. This is done by taking small amounts of liquids often.    Keep in mind that liquids are more important than food right now.    Drink only small amounts of liquids at a time.    Don t force yourself to eat, especially if you are having cramping, vomiting, or diarrhea. Don t eat large amounts at a time, even if you are hungry.    If you eat, avoid fatty, greasy, spicy, or fried  foods.    Don t eat dairy foods or drink milk if you have diarrhea. These can make diarrhea worse.  During the first 24 hours you can try:    Oral rehydration solutions.  Sports drinks may be used if you are not too dehydrated and are otherwise healthy.    Soft drinks without caffeine    Ginger ale    Water (plain or flavored)    Decaf tea or coffee    Clear broth, consommé, or bouillon    Gelatin, popsicles, or frozen fruit juice bars  The second 24 hours, if you are feeling better, you can add:    Hot cereal, plain toast, bread, rolls, or crackers    Plain noodles, rice, mashed potatoes, chicken noodle soup, or rice soup    Unsweetened canned fruit (no pineapple)    Bananas  As you recover:    Limit fat intake to less than 15 grams per day. Don t eat margarine, butter, oils, mayonnaise, sauces, gravies, fried foods, peanut butter, meat, poultry, or fish.    Limit fiber. Don t eat raw or cooked vegetables, fresh fruits except bananas, or bran cereals.    Limit caffeine and chocolate.    Limit dairy.    Don t use spices or seasonings except salt.    Go back to your normal diet over time, as you feel better and your symptoms improve.    If the symptoms come back, go back to a simple diet or clear liquids.  Follow-up care  Follow up with your healthcare provider, or as advised. If a stool sample was taken or cultures were done, call the healthcare provider for the results as instructed.  Call 911  Call 911 if you have any of these symptoms:    Trouble breathing    Confusion    Extreme drowsiness or trouble walking    Loss of consciousness    Rapid heart rate    Chest pain    Stiff neck    Seizure  When to seek medical advice  Call your healthcare provider right away if any of these occur:    Abdominal pain that gets worse    Constant lower right abdominal pain    Continued vomiting and inability to keep liquids down    Diarrhea more than 5 times a day    Blood in vomit or stool    Dark urine or no urine for 8 hours,  dry mouth and tongue, tiredness, weakness, or dizziness    Drowsiness    New rash    You don t get better in 2 to 3 days    Fever of 100.4 F (38 C) or higher, or as directed by your healthcare provider  Date Last Reviewed: 6/1/2018 2000-2019 [employer]. [address] [place] [address]. All rights reserved. This information is not intended as a substitute for professional medical care. Always follow your healthcare professional's instructions.

## 2020-01-29 DIAGNOSIS — R19.7 DIARRHEA OF PRESUMED INFECTIOUS ORIGIN: ICD-10-CM

## 2020-01-29 PROCEDURE — 87209 SMEAR COMPLEX STAIN: CPT | Performed by: PHYSICIAN ASSISTANT

## 2020-01-29 PROCEDURE — 87506 IADNA-DNA/RNA PROBE TQ 6-11: CPT | Performed by: PHYSICIAN ASSISTANT

## 2020-01-29 PROCEDURE — 87177 OVA AND PARASITES SMEARS: CPT | Performed by: PHYSICIAN ASSISTANT

## 2020-01-30 LAB
C COLI+JEJUNI+LARI FUSA STL QL NAA+PROBE: NOT DETECTED
EC STX1 GENE STL QL NAA+PROBE: NOT DETECTED
EC STX2 GENE STL QL NAA+PROBE: NOT DETECTED
ENTERIC PATHOGEN COMMENT: NORMAL
NOROV GI+II ORF1-ORF2 JNC STL QL NAA+PR: NOT DETECTED
O+P STL MICRO: NORMAL
RVA NSP5 STL QL NAA+PROBE: NOT DETECTED
SALMONELLA SP RPOD STL QL NAA+PROBE: NOT DETECTED
SHIGELLA SP+EIEC IPAH STL QL NAA+PROBE: NOT DETECTED
SPECIMEN SOURCE: NORMAL
V CHOL+PARA RFBL+TRKH+TNAA STL QL NAA+PR: NOT DETECTED
Y ENTERO RECN STL QL NAA+PROBE: NOT DETECTED

## 2020-02-10 ENCOUNTER — OFFICE VISIT (OUTPATIENT)
Dept: FAMILY MEDICINE | Facility: OTHER | Age: 60
End: 2020-02-10
Payer: COMMERCIAL

## 2020-02-10 VITALS
RESPIRATION RATE: 16 BRPM | OXYGEN SATURATION: 100 % | WEIGHT: 149 LBS | TEMPERATURE: 96.3 F | HEART RATE: 80 BPM | DIASTOLIC BLOOD PRESSURE: 74 MMHG | SYSTOLIC BLOOD PRESSURE: 118 MMHG | BODY MASS INDEX: 26.82 KG/M2

## 2020-02-10 DIAGNOSIS — R10.32 LEFT LOWER QUADRANT PAIN: ICD-10-CM

## 2020-02-10 DIAGNOSIS — R19.7 DIARRHEA, UNSPECIFIED TYPE: Primary | ICD-10-CM

## 2020-02-10 PROCEDURE — 99213 OFFICE O/P EST LOW 20 MIN: CPT | Performed by: PHYSICIAN ASSISTANT

## 2020-02-10 ASSESSMENT — PAIN SCALES - GENERAL: PAINLEVEL: SEVERE PAIN (6)

## 2020-02-10 NOTE — H&P (VIEW-ONLY)
Subjective     Mary Carmen Jaime is a 59 year old female who presents to clinic today for the following health issues:    HPI   Concern - recheck stomach cramping  Onset: recheck    Description:   Cramping in her stomach    Intensity: moderate    Progression of Symptoms:  intermittent    Accompanying Signs & Symptoms:  nothing    Previous history of similar problem:   no    Precipitating factors:   Worsened by: nothing    Alleviating factors:  Improved by: nothing    Therapies Tried and outcome: Imodium; no relief    Patient is a 59 year old female who presents today for ongoing issues with abdominal discomfort. She was first seen for this in early Jan 2020 and treated for suspected diverticular disease due to presence of diverticula on colonoscopy from 2011. She then returned in late Jan 2020 with complaint of diarrhea and underwent testing for possible c difficile and other possible infectious causes. Testing all returned negative for infection. She has been relying on OTC products such as loperamide to manage her symptoms. She returns today as the treatments do not seem to be helping and she continues to experience cramping and off/on loose stools. Denies constitutional symptoms, blood or inability to pass solid stools.     Reviewed and updated as needed this visit by Provider         Review of Systems   ROS COMP: Constitutional, HEENT, cardiovascular, pulmonary, gi and gu systems are negative, except as otherwise noted.      Objective    /74 (BP Location: Left arm, Patient Position: Chair, Cuff Size: Adult Large)   Pulse 80   Temp 96.3  F (35.7  C) (Oral)   Resp 16   Wt 67.6 kg (149 lb)   LMP 11/01/2013   SpO2 100%   BMI 26.82 kg/m    Body mass index is 26.82 kg/m .  Physical Exam   GENERAL: healthy, alert and no distress  RESP: lungs clear to auscultation - no rales, rhonchi or wheezes  CV: regular rate and rhythm, normal S1 S2, no S3 or S4, no murmur, click or rub, no peripheral edema and peripheral  pulses strong  ABDOMEN: soft, mild tenderness to LLQ, no hepatosplenomegaly, no masses and bowel sounds normal  MS: no gross musculoskeletal defects noted, no edema  NEURO: Normal strength and tone, mentation intact and speech normal  PSYCH: mentation appears normal, affect normal/bright    Diagnostic Test Results:  Labs reviewed in Epic        Assessment & Plan       ICD-10-CM    1. Diarrhea, unspecified type R19.7 GASTROENTEROLOGY ADULT REF PROCEDURE ONLY [facility] [phone]; No Provider Preferred   2. Left lower quadrant pain R10.32 GASTROENTEROLOGY ADULT REF PROCEDURE ONLY [facility] [phone]; No Provider Preferred      Unclear cause for patient's symptoms. Diverticula present on colonoscopy from 2011. Already completed treatment for diverticular disease and loose stools have become less frequent. C diff unable to be processed due to solid stool and other cultures returned without infection. Schedule colonoscopy and keep log of details surrounding symptoms.         Return in about 6 months (around 8/10/2020) for Return for scheduled annual checkup with PCP.    Qamar Washington PA-C  [facility]

## 2020-02-10 NOTE — NURSING NOTE
Health Maintenance Due   Topic Date Due     HEPATITIS C SCREENING  1960     HIV SCREENING  09/[birth year]/1975     ADVANCE CARE PLANNING  01/11/2017     PREVENTIVE CARE VISIT  05/02/2019     HPV TEST  04/05/2020     PAP  04/05/2020     Taniv GRUBER LPN    Scheduling a physical in May.

## 2020-02-10 NOTE — PROGRESS NOTES
Subjective     Mary Carmen Jaime is a 59 year old female who presents to clinic today for the following health issues:    HPI   Concern - recheck stomach cramping  Onset: recheck    Description:   Cramping in her stomach    Intensity: moderate    Progression of Symptoms:  intermittent    Accompanying Signs & Symptoms:  nothing    Previous history of similar problem:   no    Precipitating factors:   Worsened by: nothing    Alleviating factors:  Improved by: nothing    Therapies Tried and outcome: Imodium; no relief    Patient is a 59 year old female who presents today for ongoing issues with abdominal discomfort. She was first seen for this in early Jan 2020 and treated for suspected diverticular disease due to presence of diverticula on colonoscopy from 2011. She then returned in late Jan 2020 with complaint of diarrhea and underwent testing for possible c difficile and other possible infectious causes. Testing all returned negative for infection. She has been relying on OTC products such as loperamide to manage her symptoms. She returns today as the treatments do not seem to be helping and she continues to experience cramping and off/on loose stools. Denies constitutional symptoms, blood or inability to pass solid stools.     Reviewed and updated as needed this visit by Provider         Review of Systems   ROS COMP: Constitutional, HEENT, cardiovascular, pulmonary, gi and gu systems are negative, except as otherwise noted.      Objective    /74 (BP Location: Left arm, Patient Position: Chair, Cuff Size: Adult Large)   Pulse 80   Temp 96.3  F (35.7  C) (Oral)   Resp 16   Wt 67.6 kg (149 lb)   LMP 11/01/2013   SpO2 100%   BMI 26.82 kg/m    Body mass index is 26.82 kg/m .  Physical Exam   GENERAL: healthy, alert and no distress  RESP: lungs clear to auscultation - no rales, rhonchi or wheezes  CV: regular rate and rhythm, normal S1 S2, no S3 or S4, no murmur, click or rub, no peripheral edema and peripheral  pulses strong  ABDOMEN: soft, mild tenderness to LLQ, no hepatosplenomegaly, no masses and bowel sounds normal  MS: no gross musculoskeletal defects noted, no edema  NEURO: Normal strength and tone, mentation intact and speech normal  PSYCH: mentation appears normal, affect normal/bright    Diagnostic Test Results:  Labs reviewed in Epic        Assessment & Plan       ICD-10-CM    1. Diarrhea, unspecified type R19.7 GASTROENTEROLOGY ADULT REF PROCEDURE ONLY [facility] [phone]; No Provider Preferred   2. Left lower quadrant pain R10.32 GASTROENTEROLOGY ADULT REF PROCEDURE ONLY [facility] [phone]; No Provider Preferred      Unclear cause for patient's symptoms. Diverticula present on colonoscopy from 2011. Already completed treatment for diverticular disease and loose stools have become less frequent. C diff unable to be processed due to solid stool and other cultures returned without infection. Schedule colonoscopy and keep log of details surrounding symptoms.         Return in about 6 months (around 8/10/2020) for Return for scheduled annual checkup with PCP.    Qamar Washington PA-C  [facility]

## 2020-02-12 ENCOUNTER — TELEPHONE (OUTPATIENT)
Dept: FAMILY MEDICINE | Facility: CLINIC | Age: 60
End: 2020-02-12

## 2020-02-12 NOTE — LETTER

## 2020-02-12 NOTE — TELEPHONE ENCOUNTER
Date of colonoscopy/EGD: 2/18/20  Surgeon: Dr. Lincoln  Prep:Miralax  Packet:Colonoscopy/EGD instructions mailed to patient's home address.   Date: 2/12/2020      Surgery Scheduler

## 2020-02-12 NOTE — LETTER
[address] [address]  [phone]        February 12, 2020    Mary Carmen Jaime  [address] [address]

## 2020-02-18 ENCOUNTER — ANESTHESIA (OUTPATIENT)
Dept: GASTROENTEROLOGY | Facility: CLINIC | Age: 60
End: 2020-02-18
Payer: COMMERCIAL

## 2020-02-18 ENCOUNTER — HOSPITAL ENCOUNTER (OUTPATIENT)
Facility: CLINIC | Age: 60
Discharge: HOME | End: 2020-02-18
Attending: SURGERY | Admitting: SURGERY
Payer: COMMERCIAL

## 2020-02-18 ENCOUNTER — ANESTHESIA EVENT (OUTPATIENT)
Dept: GASTROENTEROLOGY | Facility: CLINIC | Age: 60
End: 2020-02-18
Payer: COMMERCIAL

## 2020-02-18 VITALS
RESPIRATION RATE: 18 BRPM | DIASTOLIC BLOOD PRESSURE: 74 MMHG | HEART RATE: 62 BPM | SYSTOLIC BLOOD PRESSURE: 118 MMHG | OXYGEN SATURATION: 97 % | TEMPERATURE: 98.2 F

## 2020-02-18 LAB — COLONOSCOPY: NORMAL

## 2020-02-18 PROCEDURE — 45380 COLONOSCOPY AND BIOPSY: CPT | Performed by: SURGERY

## 2020-02-18 PROCEDURE — 25000128 H RX IP 250 OP 636: Performed by: NURSE ANESTHETIST, CERTIFIED REGISTERED

## 2020-02-18 PROCEDURE — 25800030 ZZH RX IP 258 OP 636: Performed by: SURGERY

## 2020-02-18 PROCEDURE — 45380 COLONOSCOPY AND BIOPSY: CPT | Mod: 59 | Performed by: SURGERY

## 2020-02-18 PROCEDURE — 88305 TISSUE EXAM BY PATHOLOGIST: CPT | Mod: 26 | Performed by: SURGERY

## 2020-02-18 PROCEDURE — 45385 COLONOSCOPY W/LESION REMOVAL: CPT | Performed by: SURGERY

## 2020-02-18 PROCEDURE — 37000009 ZZH ANESTHESIA TECHNICAL FEE, EACH ADDTL 15 MIN: Performed by: SURGERY

## 2020-02-18 PROCEDURE — 25000125 ZZHC RX 250: Performed by: NURSE ANESTHETIST, CERTIFIED REGISTERED

## 2020-02-18 PROCEDURE — 25000132 ZZH RX MED GY IP 250 OP 250 PS 637: Performed by: SURGERY

## 2020-02-18 PROCEDURE — 88305 TISSUE EXAM BY PATHOLOGIST: CPT | Performed by: SURGERY

## 2020-02-18 PROCEDURE — 37000008 ZZH ANESTHESIA TECHNICAL FEE, 1ST 30 MIN: Performed by: SURGERY

## 2020-02-18 RX ORDER — SODIUM CHLORIDE, SODIUM LACTATE, POTASSIUM CHLORIDE, CALCIUM CHLORIDE 600; 310; 30; 20 MG/100ML; MG/100ML; MG/100ML; MG/100ML
INJECTION, SOLUTION INTRAVENOUS CONTINUOUS
Status: DISCONTINUED | OUTPATIENT
Start: 2020-02-18 | End: 2020-02-18 | Stop reason: HOSPADM

## 2020-02-18 RX ORDER — PROPOFOL 10 MG/ML
INJECTION, EMULSION INTRAVENOUS PRN
Status: DISCONTINUED | OUTPATIENT
Start: 2020-02-18 | End: 2020-02-18

## 2020-02-18 RX ORDER — NALOXONE HYDROCHLORIDE 0.4 MG/ML
.1-.4 INJECTION, SOLUTION INTRAMUSCULAR; INTRAVENOUS; SUBCUTANEOUS
Status: DISCONTINUED | OUTPATIENT
Start: 2020-02-18 | End: 2020-02-18 | Stop reason: HOSPADM

## 2020-02-18 RX ORDER — LIDOCAINE 40 MG/G
CREAM TOPICAL
Status: DISCONTINUED | OUTPATIENT
Start: 2020-02-18 | End: 2020-02-18 | Stop reason: HOSPADM

## 2020-02-18 RX ORDER — ONDANSETRON 4 MG/1
4 TABLET, ORALLY DISINTEGRATING ORAL EVERY 6 HOURS PRN
Status: DISCONTINUED | OUTPATIENT
Start: 2020-02-18 | End: 2020-02-18 | Stop reason: HOSPADM

## 2020-02-18 RX ORDER — ONDANSETRON 2 MG/ML
4 INJECTION INTRAMUSCULAR; INTRAVENOUS EVERY 6 HOURS PRN
Status: DISCONTINUED | OUTPATIENT
Start: 2020-02-18 | End: 2020-02-18 | Stop reason: HOSPADM

## 2020-02-18 RX ORDER — PROPOFOL 10 MG/ML
INJECTION, EMULSION INTRAVENOUS CONTINUOUS PRN
Status: DISCONTINUED | OUTPATIENT
Start: 2020-02-18 | End: 2020-02-18

## 2020-02-18 RX ORDER — FLUMAZENIL 0.1 MG/ML
0.2 INJECTION, SOLUTION INTRAVENOUS
Status: DISCONTINUED | OUTPATIENT
Start: 2020-02-18 | End: 2020-02-18 | Stop reason: HOSPADM

## 2020-02-18 RX ORDER — LIDOCAINE HYDROCHLORIDE 20 MG/ML
INJECTION, SOLUTION INFILTRATION; PERINEURAL PRN
Status: DISCONTINUED | OUTPATIENT
Start: 2020-02-18 | End: 2020-02-18

## 2020-02-18 RX ORDER — ONDANSETRON 2 MG/ML
4 INJECTION INTRAMUSCULAR; INTRAVENOUS
Status: DISCONTINUED | OUTPATIENT
Start: 2020-02-18 | End: 2020-02-18 | Stop reason: HOSPADM

## 2020-02-18 RX ADMIN — LIDOCAINE HYDROCHLORIDE 60 MG: 20 INJECTION, SOLUTION INFILTRATION; PERINEURAL at 07:49

## 2020-02-18 RX ADMIN — PROPOFOL 150 MCG/KG/MIN: 10 INJECTION, EMULSION INTRAVENOUS at 07:49

## 2020-02-18 RX ADMIN — PROPOFOL 80 MG: 10 INJECTION, EMULSION INTRAVENOUS at 07:49

## 2020-02-18 NOTE — ANESTHESIA CARE TRANSFER NOTE
Patient: Mary Carmen Jaime    Procedure(s):  Colonoscopy, Polypectomy by Snare and Biopsy by Biopsy    Diagnosis: Diarrhea, unspecified type [R19.7]  Abdominal pain, left lower quadrant [R10.32]  Diagnosis Additional Information: No value filed.    Anesthesia Type:   MAC     Note:    Patient transferred to:Phase II  Handoff Report: Identifed the Patient, Identified the Reponsible Provider, Reviewed the pertinent medical history, Discussed the surgical course, Reviewed Intra-OP anesthesia mangement and issues during anesthesia, Set expectations for post-procedure period and Allowed opportunity for questions and acknowledgement of understanding      Vitals: (Last set prior to Anesthesia Care Transfer)    CRNA VITALS  2/18/2020 0758 - 2/18/2020 0834      2/18/2020             Resp Rate (observed):  25                Electronically Signed By: DOT Ibrahim CRNA  February 18, 2020  8:34 AM

## 2020-02-18 NOTE — ANESTHESIA PREPROCEDURE EVALUATION
Anesthesia Pre-Procedure Evaluation    Patient: Mary Carmen Jaime   MRN: [id number] : 1960          Preoperative Diagnosis: Diarrhea, unspecified type [R19.7]  Abdominal pain, left lower quadrant [R10.32]    Procedure(s):  Colonoscopy with possible biopsy and/or polypectomy    Past Medical History:   Diagnosis Date     ASCUS on Pap smear 2011    10/15/09 pap ASCUS neg HPV. Plan-- repeat screening pap smear in one year (due 10/15/2010) Pap 11/10/10 NIL.  Repeat 3 years.        Complication of anesthesia      PONV (postoperative nausea and vomiting)      Past Surgical History:   Procedure Laterality Date     C APPENDECTOMY       COLONOSCOPY  1/10/2011    normal repeat 10 years     LAPAROSCOPIC TUBAL LIGATION  3/9/2012    Procedure:LAPAROSCOPIC TUBAL LIGATION; Laparoscopic Tubal; Surgeon:RUPAL FREEMAN; Location:[facility] OR     RELEASE CARPAL TUNNEL Right 2016    Procedure: RELEASE CARPAL TUNNEL;  Surgeon: Parmjit Peña DO;  Location: [facility] OR     RELEASE CARPAL TUNNEL Left 10/21/2016    Procedure: RELEASE CARPAL TUNNEL;  Surgeon: Parmjit Peña DO;  Location: [facility] OR     RELEASE TRIGGER FINGER Right 2016    Procedure: RELEASE TRIGGER FINGER;  Surgeon: Parmjit Peña DO;  Location: [facility] OR       Anesthesia Evaluation     . Pt has had prior anesthetic. Type: MAC and General    History of anesthetic complications   - PONV        ROS/MED HX    ENT/Pulmonary:  - neg pulmonary ROS     Neurologic:  - neg neurologic ROS     Cardiovascular:  - neg cardiovascular ROS   (+) ----. : . . . :. . Previous cardiac testing date:results:Stress Testdate:3/28/18 results:Normal stress testECG reviewed date:3/28/18 results:SR date: results:         (-) dyslipidemia   METS/Exercise Tolerance:  >4 METS   Hematologic:  - neg hematologic  ROS       Musculoskeletal:  - neg musculoskeletal ROS       GI/Hepatic:  - neg GI/hepatic ROS       Renal/Genitourinary:  - ROS Renal section negative   (+) Pt has  "no history of transplant,       Endo:  - neg endo ROS       Psychiatric:  - neg psychiatric ROS       Infectious Disease:  - neg infectious disease ROS       Malignancy:      - no malignancy   Other:    (+) No chance of pregnancy C-spine cleared: N/A, no H/O Chronic Pain,no other significant disability                         Physical Exam  Normal systems: cardiovascular, pulmonary and dental    Airway   Mallampati: II  TM distance: >3 FB  Neck ROM: full    Dental     Cardiovascular   Rhythm and rate: regular and normal      Pulmonary    breath sounds clear to auscultation            Lab Results   Component Value Date    WBC 9.1 03/26/2018    HGB 13.9 03/26/2018    HCT 41.9 03/26/2018     03/26/2018     03/26/2018    POTASSIUM 3.9 03/26/2018    CHLORIDE 104 03/26/2018    CO2 29 03/26/2018    BUN 18 03/26/2018    CR 0.76 03/26/2018    GLC 88 03/26/2018    SHIV 9.4 03/26/2018    TSH 3.89 03/26/2018    HCG Negative 03/09/2012       Preop Vitals  BP Readings from Last 3 Encounters:   02/18/20 117/66   02/10/20 118/74   01/28/20 116/74    Pulse Readings from Last 3 Encounters:   02/18/20 60   02/10/20 80   01/28/20 68      Resp Readings from Last 3 Encounters:   02/18/20 18   02/10/20 16   01/28/20 16    SpO2 Readings from Last 3 Encounters:   02/18/20 98%   02/10/20 100%   01/28/20 98%      Temp Readings from Last 1 Encounters:   02/18/20 98.2  F (36.8  C)    Ht Readings from Last 1 Encounters:   01/28/20 1.588 m (5' 2.5\")      Wt Readings from Last 1 Encounters:   02/10/20 67.6 kg (149 lb)    Estimated body mass index is 26.82 kg/m  as calculated from the following:    Height as of 1/28/20: 1.588 m (5' 2.5\").    Weight as of 2/10/20: 67.6 kg (149 lb).       Anesthesia Plan      History & Physical Review  History and physical reviewed and following examination; no interval change.    ASA Status:  2 .    NPO Status:  > 8 hours    Plan for MAC with Intravenous and Propofol induction. Maintenance will be TIVA.  " Reason for MAC:  Deep or markedly invasive procedure (G8)         Postoperative Care      Consents  Anesthetic plan, risks, benefits and alternatives discussed with:  Patient..                 DOT Ibrahim CRNA

## 2020-02-18 NOTE — ANESTHESIA POSTPROCEDURE EVALUATION
Patient: Mary Carmen Jaime    Procedure(s):  Colonoscopy, Polypectomy by Snare and Biopsy by Biopsy    Diagnosis:Diarrhea, unspecified type [R19.7]  Abdominal pain, left lower quadrant [R10.32]  Diagnosis Additional Information: No value filed.    Anesthesia Type:  MAC    Note:  Anesthesia Post Evaluation    Patient location during evaluation: Phase 2  Patient participation: Able to fully participate in evaluation  Level of consciousness: awake and alert  Pain management: adequate  Airway patency: patent  Cardiovascular status: blood pressure returned to baseline  Respiratory status: spontaneous ventilation and room air  Hydration status: acceptable  PONV: none     Anesthetic complications: None    Comments: Patient was very pleased with her anesthetic today. No anesthesia concerns.         Last vitals:  Vitals:    02/18/20 0830 02/18/20 0840 02/18/20 0850   BP: 106/65 106/71 118/74   Pulse: 68 55 62   Resp:      Temp:      SpO2: (!) 89% 98% 97%         Electronically Signed By: DOT Ibrahim CRNA  February 18, 2020  9:02 AM

## 2020-02-18 NOTE — ANESTHESIA CARE TRANSFER NOTE
Patient: Mary Carmen Jaime    Procedure(s):  Colonoscopy, Polypectomy by Snare and Biopsy by Biopsy    Diagnosis: Diarrhea, unspecified type [R19.7]  Abdominal pain, left lower quadrant [R10.32]  Diagnosis Additional Information: No value filed.    Anesthesia Type:   MAC     Note:    Patient transferred to:Phase II  Handoff Report: Identifed the Patient, Identified the Reponsible Provider, Reviewed the pertinent medical history, Discussed the surgical course, Reviewed Intra-OP anesthesia mangement and issues during anesthesia, Set expectations for post-procedure period and Allowed opportunity for questions and acknowledgement of understanding      Vitals: (Last set prior to Anesthesia Care Transfer)    CRNA VITALS  2/18/2020 0758 - 2/18/2020 0833      2/18/2020             Resp Rate (observed):  25                Electronically Signed By: DOT Ibrahim CRNA  February 18, 2020  8:33 AM

## 2020-02-18 NOTE — DISCHARGE INSTRUCTIONS
[facility]    Home Care Following Endoscopy          Activity:    You have just undergone an endoscopic procedure usually performed with conscious sedation.  Do not work or operate machinery (including a car) for at least 12 hours.      I encourage you to walk and attempt to pass this air as soon as possible.    Diet:    Return to the diet you were on before your procedure but eat lightly for the first 12-24 hours.    Drink plenty of water.    Resume any regular medications unless otherwise advised by your physician.  Please begin any new medication prescribed as a result of your procedure as directed by your physician.     If you had any biopsy or polyp removed please refrain from aspirin or aspirin products for 2 days.  If on Coumadin please restart as instructed by your physician.   Pain:    You may take Tylenol as needed for pain.  Expected Recovery:    You can expect some mild abdominal fullness and/or discomfort due to the air used to inflate your intestinal tract. It is also normal to have a mild sore throat after upper endoscopy.    Call Your Physician if You Have:    After Upper Endoscopy:  o Shoulder, back or chest pain.  o Difficulty breathing or swallowing.  o Vomiting blood.    After Colonoscopy:  o Worsening persisting abdominal pain which is worse with activity.  o Fevers (>101 degrees F), chills or shakes.  o Passage of continued blood with bowel movements.   Any questions or concerns about your recovery, please call [phone] or after hours [phone] Nurse Advice Line.    Follow-up Care:    You should receive a call or letter with your results within 1 week. Please call if you have not received a notification of your results.  If asked to return to clinic please make an appointment 1 week after your procedure.  Call [phone].

## 2020-02-18 NOTE — ANESTHESIA CARE TRANSFER NOTE
Patient: Mary Carmen Jaime    Procedure(s):  Colonoscopy, Polypectomy by Snare and Biopsy by Biopsy    Diagnosis: Diarrhea, unspecified type [R19.7]  Abdominal pain, left lower quadrant [R10.32]  Diagnosis Additional Information: No value filed.    Anesthesia Type:   MAC     Note:    Patient transferred to:Phase II  Handoff Report: Identifed the Patient, Identified the Reponsible Provider, Reviewed the pertinent medical history, Discussed the surgical course, Reviewed Intra-OP anesthesia mangement and issues during anesthesia, Set expectations for post-procedure period and Allowed opportunity for questions and acknowledgement of understanding      Vitals: (Last set prior to Anesthesia Care Transfer)    CRNA VITALS  2/18/2020 0758 - 2/18/2020 0858      2/18/2020             Resp Rate (observed):  25                Electronically Signed By: DOT Ibrahim CRNA  February 18, 2020  9:03 AM

## 2020-02-18 NOTE — LETTER
Mary Carmen Jaime  [id number] [facility]  [place] [address]    February 21, 2020      Dear Mary Carmen,  This letter is to inform you of the results of your pathology report from your colonoscopy.  Your pathology report was:  FINAL DIAGNOSIS:   A. Colon, transverse, polyp, polypectomy   - Tubular adenoma.   - No evidence of high grade dysplasia or invasive malignancy.     B. Colon, sigmoid, biopsy   - Colonic-type mucosa with no diagnostic alterations.     C. Colon, sigmoid, polyp, polypectomy   - Tubular adenoma.   - No evidence of high grade dysplasia or invasive malignancy.     D. Rectum, polyp, polypectomy   - Tubular adenoma.   - No evidence of high grade dysplasia or invasive malignancy.     E. Rectum, biopsy:   - Colonic-type mucosa with no diagnostic alterations.  These are benign polyps. The adenoma types of polyps do carry a small risk of developing into a cancer over time if not removed. Yours were completely removed at the time of your colonoscopy. Due to the number of polyps we found, you should have another surveillance colonoscopy in 3 years. The other biopsies I took where there appeared to be mild irritation did not show any concerning findings.  If you have further questions please don t hesitate to call our clinic at [phone].   Sincerely,     Vikash Lincoln, DO

## 2020-02-20 LAB — COPATH REPORT: NORMAL

## 2020-12-07 ENCOUNTER — OFFICE VISIT (OUTPATIENT)
Dept: FAMILY MEDICINE | Facility: CLINIC | Age: 60
End: 2020-12-07
Payer: COMMERCIAL

## 2020-12-07 VITALS
DIASTOLIC BLOOD PRESSURE: 70 MMHG | SYSTOLIC BLOOD PRESSURE: 114 MMHG | HEIGHT: 61 IN | WEIGHT: 153.6 LBS | HEART RATE: 87 BPM | TEMPERATURE: 97.8 F | OXYGEN SATURATION: 99 % | RESPIRATION RATE: 12 BRPM | BODY MASS INDEX: 29 KG/M2

## 2020-12-07 DIAGNOSIS — Z23 NEED FOR VACCINATION: ICD-10-CM

## 2020-12-07 DIAGNOSIS — Z12.31 ENCOUNTER FOR SCREENING MAMMOGRAM FOR MALIGNANT NEOPLASM OF BREAST: ICD-10-CM

## 2020-12-07 DIAGNOSIS — Z83.3 FAMILY HISTORY OF DIABETES MELLITUS: ICD-10-CM

## 2020-12-07 DIAGNOSIS — Z00.00 ROUTINE GENERAL MEDICAL EXAMINATION AT A HEALTH CARE FACILITY: Primary | ICD-10-CM

## 2020-12-07 DIAGNOSIS — Z13.6 CARDIOVASCULAR SCREENING; LDL GOAL LESS THAN 160: ICD-10-CM

## 2020-12-07 DIAGNOSIS — K21.9 GASTROESOPHAGEAL REFLUX DISEASE, UNSPECIFIED WHETHER ESOPHAGITIS PRESENT: ICD-10-CM

## 2020-12-07 DIAGNOSIS — M85.89 OSTEOPENIA OF MULTIPLE SITES: ICD-10-CM

## 2020-12-07 DIAGNOSIS — Z12.4 SCREENING FOR MALIGNANT NEOPLASM OF CERVIX: ICD-10-CM

## 2020-12-07 DIAGNOSIS — L65.9 HAIR LOSS: ICD-10-CM

## 2020-12-07 LAB
CHOLEST SERPL-MCNC: 233 MG/DL
FERRITIN SERPL-MCNC: 110 NG/ML (ref 8–252)
GLUCOSE SERPL-MCNC: 95 MG/DL (ref 70–99)
HDLC SERPL-MCNC: 80 MG/DL
LDLC SERPL CALC-MCNC: 129 MG/DL
NONHDLC SERPL-MCNC: 153 MG/DL
TRIGL SERPL-MCNC: 119 MG/DL
TSH SERPL DL<=0.005 MIU/L-ACNC: 2.35 MU/L (ref 0.4–4)

## 2020-12-07 PROCEDURE — 36415 COLL VENOUS BLD VENIPUNCTURE: CPT | Performed by: FAMILY MEDICINE

## 2020-12-07 PROCEDURE — 82728 ASSAY OF FERRITIN: CPT | Performed by: FAMILY MEDICINE

## 2020-12-07 PROCEDURE — 84443 ASSAY THYROID STIM HORMONE: CPT | Performed by: FAMILY MEDICINE

## 2020-12-07 PROCEDURE — 99396 PREV VISIT EST AGE 40-64: CPT | Mod: 25 | Performed by: FAMILY MEDICINE

## 2020-12-07 PROCEDURE — G0145 SCR C/V CYTO,THINLAYER,RESCR: HCPCS | Performed by: FAMILY MEDICINE

## 2020-12-07 PROCEDURE — 90715 TDAP VACCINE 7 YRS/> IM: CPT | Performed by: FAMILY MEDICINE

## 2020-12-07 PROCEDURE — 82306 VITAMIN D 25 HYDROXY: CPT | Performed by: FAMILY MEDICINE

## 2020-12-07 PROCEDURE — 87624 HPV HI-RISK TYP POOLED RSLT: CPT | Performed by: FAMILY MEDICINE

## 2020-12-07 PROCEDURE — 82947 ASSAY GLUCOSE BLOOD QUANT: CPT | Performed by: FAMILY MEDICINE

## 2020-12-07 PROCEDURE — 90471 IMMUNIZATION ADMIN: CPT | Performed by: FAMILY MEDICINE

## 2020-12-07 PROCEDURE — 80061 LIPID PANEL: CPT | Performed by: FAMILY MEDICINE

## 2020-12-07 RX ORDER — VIT C/B6/B5/MAGNESIUM/HERB 173 50-5-6-5MG
1 CAPSULE ORAL DAILY
COMMUNITY
Start: 2020-12-07

## 2020-12-07 ASSESSMENT — MIFFLIN-ST. JEOR: SCORE: 1210.72

## 2020-12-07 NOTE — PROGRESS NOTES
SUBJECTIVE:   CC: Mary Carmen Jaime is an 60 year old woman who presents for preventive health visit.       Patient has been advised of split billing requirements and indicates understanding: Yes  Healthy Habits:    Getting at least 3 servings of Calcium per day:  Yes    Bi-annual eye exam:  Yes    Dental care twice a year:  Yes    Sleep apnea or symptoms of sleep apnea:  None    Diet:  Regular (no restrictions)    Frequency of exercise:  2-3 days/week    Duration of exercise:  15-30 minutes    Taking medications regularly:  Yes    Barriers to taking medications:  Not applicable    Medication side effects:  None    PHQ-2 Total Score:    Additional concerns today:  Yes (Hair thinning, possible acid reflux)          Patient would like to discuss possible acid reflux patient would also like to discuss hair thinning     Today's PHQ-2 Score:   PHQ-2 ( 1999 [employer]) 1/7/2020   Q1: Little interest or pleasure in doing things 0   Q2: Feeling down, depressed or hopeless 0   PHQ-2 Score 0   Q1: Little interest or pleasure in doing things -   Q2: Feeling down, depressed or hopeless -   PHQ-2 Score -       Abuse: Current or Past (Physical, Sexual or Emotional) - No  Do you feel safe in your environment? Yes    Have you ever done Advance Care Planning? (For example, a Health Directive, POLST, or a discussion with a medical provider or your loved ones about your wishes): Yes, patient states has an Advance Care Planning document and will bring a copy to the clinic.    Social History     Tobacco Use     Smoking status: Never Smoker     Smokeless tobacco: Never Used   Substance Use Topics     Alcohol use: Yes     Alcohol/week: 0.0 standard drinks     Comment: 0-3 drinks per week     If you drink alcohol do you typically have >3 drinks per day or >7 drinks per week? No    Alcohol Use 12/7/2020   Prescreen: >3 drinks/day or >7 drinks/week? -   Prescreen: >3 drinks/day or >7 drinks/week? No   AUDIT SCORE  -       Reviewed orders with  patient.  Reviewed health maintenance and updated orders accordingly - Yes  BP Readings from Last 3 Encounters:   12/07/20 114/70   02/18/20 118/74   02/10/20 118/74    Wt Readings from Last 3 Encounters:   12/07/20 69.7 kg (153 lb 9.6 oz)   02/10/20 67.6 kg (149 lb)   01/28/20 68.3 kg (150 lb 8 oz)                  Patient Active Problem List   Diagnosis     Rosacea     FAMILY HX CONDITION NEC     CARDIOVASCULAR SCREENING; LDL GOAL LESS THAN 160     Advanced directives, counseling/discussion     Trigger finger of right thumb     Bilateral carpal tunnel syndrome     Left carpal tunnel syndrome     Past Surgical History:   Procedure Laterality Date     C APPENDECTOMY  01/01/1996     COLONOSCOPY  01/10/2011    normal repeat 10 years     COLONOSCOPY N/A 02/18/2020    tubular adenoma repeat 3-5 years     LAPAROSCOPIC TUBAL LIGATION  03/09/2012    Procedure:LAPAROSCOPIC TUBAL LIGATION; Laparoscopic Tubal; Surgeon:RUPAL FREEMAN; Location:[facility]     RELEASE CARPAL TUNNEL Right 06/24/2016    Procedure: RELEASE CARPAL TUNNEL;  Surgeon: Parmjit Peña DO;  Location: [facility]     RELEASE CARPAL TUNNEL Left 10/21/2016    Procedure: RELEASE CARPAL TUNNEL;  Surgeon: Pamrjit Peña DO;  Location: [facility]     RELEASE TRIGGER FINGER Right 06/24/2016    Procedure: RELEASE TRIGGER FINGER;  Surgeon: Parmjit Peña DO;  Location: [facility]       Social History     Tobacco Use     Smoking status: Never Smoker     Smokeless tobacco: Never Used   Substance Use Topics     Alcohol use: Yes     Alcohol/week: 0.0 standard drinks     Comment: 0-3 drinks per week     Family History   Problem Relation Age of Onset     Diabetes Father         AOD     Cancer Father         salivary glands     Heart Disease Father         1st MI in 40's     Diabetes Sister         JOD     Cancer Paternal Uncle         Prostate     Genitourinary Problems Sister         kidney abnormality, hypertrophy and rupture           Mammogram Screening:  "Recommended mammography every 1-2 years with patient discussion and risk factor consideration    Pertinent mammograms are reviewed under the imaging tab.  History of abnormal Pap smear: NO - age 30- 65 PAP every 3 years recommended  PAP / HPV Latest Ref Rng & Units 4/5/2017 3/26/2014 11/10/2010   PAP - NIL NIL NIL   HPV 16 DNA NEG Negative - -   HPV 18 DNA NEG Negative - -   OTHER HR HPV NEG Negative - -     Reviewed and updated as needed this visit by clinical staff  Tobacco  Allergies  Meds  Problems  Med Hx  Surg Hx  Fam Hx  Soc Hx          Reviewed and updated as needed this visit by Provider  Tobacco  Allergies  Meds  Problems  Med Hx  Surg Hx  Fam Hx             Review of Systems  CONSTITUTIONAL: NEGATIVE for fever, chills, change in weight  INTEGUMENTARY/SKIN: NEGATIVE for worrisome rashes, moles or lesions  EYES: NEGATIVE for vision changes or irritation  ENT: NEGATIVE for ear, mouth and throat problems  RESP: NEGATIVE for significant cough or SOB  BREAST: NEGATIVE for masses, tenderness or discharge  CV: NEGATIVE for chest pain, palpitations or peripheral edema  GI: NEGATIVE for nausea, abdominal pain, heartburn, or change in bowel habits  : NEGATIVE for unusual urinary or vaginal symptoms. No vaginal bleeding.  MUSCULOSKELETAL: NEGATIVE for significant arthralgias or myalgia  NEURO: NEGATIVE for weakness, dizziness or paresthesias  PSYCHIATRIC: NEGATIVE for changes in mood or affect     She does get some burning acid reflux in her throat, happens more often after a smoked pork chop which is dry and salty. Her BMs are better with taking Fiberall.   Her hair has been falling out since fall. Wondering if this is normal or due to some health concern. Not patchy, just losing more at a time. No scalp concerns.      OBJECTIVE:   /70   Pulse 87   Temp 97.8  F (36.6  C) (Temporal)   Resp 12   Ht 1.56 m (5' 1.42\")   Wt 69.7 kg (153 lb 9.6 oz)   LMP 11/01/2013   SpO2 99%   BMI 28.63 " kg/m    Physical Exam  GENERAL: healthy, alert and no distress  EYES: Eyes grossly normal to inspection, PERRL and conjunctivae and sclerae normal  HENT: ear canals and TM's normal, nose and mouth without ulcers or lesions  NECK: no adenopathy, no asymmetry, masses, or scars and thyroid normal to palpation  RESP: lungs clear to auscultation - no rales, rhonchi or wheezes  BREAST: normal without masses, tenderness or nipple discharge and no palpable axillary masses or adenopathy  CV: regular rate and rhythm, normal S1 S2, no S3 or S4, no murmur, click or rub, no peripheral edema and peripheral pulses strong  ABDOMEN: soft, nontender, no hepatosplenomegaly, no masses and bowel sounds normal   (female): normal female external genitalia, normal urethral meatus , vaginal mucosa pink, moist, well rugated and normal cervix, adnexae, and uterus without masses. Pap cotest collected.  MS: no gross musculoskeletal defects noted, no edema  SKIN: no suspicious lesions or rashes, scalp normal. No patchy alopecia. Hair fine.   NEURO: Normal strength and tone, mentation intact and speech normal  PSYCH: mentation appears normal, affect normal/bright    Orders Placed This Encounter   Procedures     MA Screen Bilateral w/Quan     DX Hip/Pelvis/Spine     TDAP VACCINE (Adacel, Boostrix)  [[id number]]     Pap imaged thin layer screen with HPV - recommended age 30 - 65 years (select HPV order below)     HPV High Risk Types DNA Cervical     Lipid panel reflex to direct LDL Fasting     TSH with free T4 reflex     Vitamin D Deficiency     Ferritin     Glucose        ASSESSMENT/PLAN:       ICD-10-CM    1. Routine general medical examination at a health care facility  Z00.00 Turmeric 500 MG CAPS   2. Hair loss  L65.9 TSH with free T4 reflex     Vitamin D Deficiency     Ferritin   3. Gastroesophageal reflux disease, unspecified whether esophagitis present  K21.9    4. Osteopenia of multiple sites  M85.89 DX Hip/Pelvis/Spine   5. Encounter for  "screening mammogram for malignant neoplasm of breast  Z12.31 MA Screen Bilateral w/Quan   6. Family history of diabetes mellitus  Z83.3 Glucose   7. Screening for malignant neoplasm of cervix  Z12.4 Pap imaged thin layer screen with HPV - recommended age 30 - 65 years (select HPV order below)     HPV High Risk Types DNA Cervical   8. CARDIOVASCULAR SCREENING; LDL GOAL LESS THAN 160  Z13.6 Lipid panel reflex to direct LDL Fasting     Glucose   9. Need for vaccination  Z23 TDAP VACCINE (Adacel, Boostrix)  [[id number]]       Patient has been advised of split billing requirements and indicates understanding: Yes  I recommend annual physical, mammogram, monthly SBE, and pap q 3 years if normal.   We discussed thinning hair, this could be normal aging, but will check labs to rule out other causes such as iron, vitamin D deficiency or thyroid disease.   Fasting lipids, glucose pending due to screening and family history.   Will notify with lab results.     COUNSELING:  Reviewed preventive health counseling, as reflected in patient instructions  Special attention given to:        Regular exercise       Healthy diet/nutrition       Vision screening       Immunizations    Vaccinated for: TDAP             Osteoporosis prevention/bone health - recommend DEXA.        Colon cancer screening is up to date       Advance Care Planning    Estimated body mass index is 28.63 kg/m  as calculated from the following:    Height as of this encounter: 1.56 m (5' 1.42\").    Weight as of this encounter: 69.7 kg (153 lb 9.6 oz).    Weight management plan: Discussed healthy diet and exercise guidelines    She reports that she has never smoked. She has never used smokeless tobacco.    See patient instructions:    Patient Instructions   Please check with your insurance regarding coverage for a bone density test called DEXA scan.  You can schedule this at your convenience.     Please schedule your mammogram at your convenience.      I will notify you " with lab results from today.     I encourage you to aim for 150 minutes of aerobic exercise each week.     I have enclosed some information on acid reflux below.       Counseling Resources:  ATP IV Guidelines  Pooled Cohorts Equation Calculator  Breast Cancer Risk Calculator  BRCA-Related Cancer Risk Assessment: FHS-7 Tool  FRAX Risk Assessment  ICSI Preventive Guidelines  Dietary Guidelines for Americans, 2010  USDA's MyPlate  ASA Prophylaxis  Lung CA Screening    Huong Greenwood MD  [facility] [place]

## 2020-12-07 NOTE — PATIENT INSTRUCTIONS
Please check with your insurance regarding coverage for a bone density test called DEXA scan.  You can schedule this at your convenience.     Please schedule your mammogram at your convenience.      I will notify you with lab results from today.     I encourage you to aim for 150 minutes of aerobic exercise each week.     I have enclosed some information on acid reflux below.                   Preventive Health Recommendations  Female Ages 50 - 64    Yearly exam: See your health care provider every year in order to  o Review health changes.   o Discuss preventive care.    o Review your medicines if your doctor has prescribed any.      Get a Pap test every three years (unless you have an abnormal result and your provider advises testing more often).    If you get Pap tests with HPV test, you only need to test every 5 years, unless you have an abnormal result.     You do not need a Pap test if your uterus was removed (hysterectomy) and you have not had cancer.    You should be tested each year for STDs (sexually transmitted diseases) if you're at risk.     Have a mammogram every 1 to 2 years.    Have a colonoscopy at age 50, or have a yearly FIT test (stool test). These exams screen for colon cancer.      Have a cholesterol test every 5 years, or more often if advised.    Have a diabetes test (fasting glucose) every three years. If you are at risk for diabetes, you should have this test more often.     If you are at risk for osteoporosis (brittle bone disease), think about having a bone density scan (DEXA).    Shots: Get a flu shot each year. Get a tetanus shot every 10 years.    Nutrition:     Eat at least 5 servings of fruits and vegetables each day.    Eat whole-grain bread, whole-wheat pasta and brown rice instead of white grains and rice.    Get adequate Calcium and Vitamin D.     Lifestyle    Exercise at least 150 minutes a week (30 minutes a day, 5 days a week). This will help you control your weight and  prevent disease.    Limit alcohol to one drink per day.    No smoking.     Wear sunscreen to prevent skin cancer.     See your dentist every six months for an exam and cleaning.    See your eye doctor every 1 to 2 years.    Patient Education     Tips to Control Acid Reflux    To control acid reflux, you ll need to make some basic diet and lifestyle changes. The simple steps outlined below may be all you ll need to ease discomfort.   Watch what you eat    Don't have fatty foods or spicy foods.    Eat fewer acidic foods, such as citrus and tomato-based foods. These can increase symptoms.    Limit drinking alcohol, caffeine, and fizzy beverages. All increase acid reflux.    Try limiting chocolate, peppermint, and spearmint. These can make acid reflux worse in some people.    Watch when you eat    Don't lie down for 3 hours after eating.    Don't snack before going to bed.    Raise your head  Raising your head and upper body by 4 to 6 inches helps limit reflux when you re lying down. Put blocks under the head of your bed frame or a wedge under your mattress to raise it.   Other changes    Lose weight, if you need to    Don t exercise near bedtime    Don't wear tight-fitting clothes    Limit aspirin and ibuprofen    Stop smoking    [employer] last reviewed this educational content on 6/1/2019 2000-2020 [employer]. [address] [place] [address], [place] [address]. All rights reserved. This information is not intended as a substitute for professional medical care. Always follow your healthcare professional's instructions.           Patient Education     GERD (Adult)    The esophagus is a tube that carries food from the mouth to the stomach. A valve (the LES, lower esophageal sphincter) at the lower end of the esophagus prevents stomach acid from flowing upward. When this valve doesn't work properly, stomach contents may repeatedly flow back up (reflux) into the esophagus. This is called gastroesophageal reflux  "disease (GERD). GERD can irritate the esophagus. It can cause problems with pain, swallowing or breathing. In severe cases, GERD can cause recurrent pneumonia (from aspiration or breathing in particles) or other serious problems.  Symptoms of reflux include burning, pressure or sharp pain in the upper abdomen or mid to lower chest. The pain can spread to the neck, back, or shoulder. There may be belching, an acid taste in the back of the throat, chronic cough, or sore throat, or hoarseness. GERD symptoms often occur during the day after a big meal. They can also occur at night when lying down.   Home care  Lifestyle changes can help reduce symptoms. If needed, your healthcare provider may prescribe medicines. Symptoms often improve with treatment, but if treatment is stopped, the symptoms often return after a few months. So most persons with GERD will need to continue treatment or get treatment on and off.  Lifestyle changes    Limit or avoid fatty, fried, and spicy foods, as well as coffee, chocolate, mint, and foods with high acid content such as tomatoes and citrus fruit and juices (orange, grapefruit, lemon).    Don t eat large meals, especially at night. Frequent, smaller meals are best. Don't lie down right after eating. And don t eat anything 3 hours before going to bed.    Don't drink alcohol or smoke. As much as possible, stay away from second hand smoke.    If you are overweight, losing weight will reduce symptoms.     Don't wear tight clothing around your stomach area.    If your symptoms occur during sleep, use a foam wedge to elevate your upper body (not just your head.) Or, place 4\" blocks under the head of your bed. Or use 2 bed risers under your bedframe.  Medicines  If needed, medicines can help relieve the symptoms of GERD and prevent damage to the esophagus. Discuss a medicine plan with your healthcare provider. This may include one or more of the following medicines:    Antacids to help " neutralize the normal acids in your stomach.    Acid blockers (Histamine or H2 blockers) to decrease acid production.    Acid inhibitors (proton pump inhibitors PPIs) to decrease acid production in a different way than the blockers. They may work better, but can take a little longer to take effect.  Take an antacid 30 to 60 minutes after eating and at bedtime, but not at the same time as an acid blocker.  Try not to take medicines such as ibuprofen and aspirin. If you are taking aspirin for your heart or other medical reasons, talk to your healthcare provider about stopping it.  Follow-up care  Follow up with your healthcare provider or as advised by our staff.  When to seek medical advice  Call your healthcare provider if any of the following occur:    Stomach pain gets worse or moves to the lower right abdomen (appendix area)    Chest pain appears or gets worse, or spreads to the back, neck, shoulder, or arm    An over-the-counter trial of medicine doesn't relieve your symptoms    Weight loss that can't be explained    Trouble or pain swallowing    Frequent vomiting (can t keep down liquids)    Blood in the stool or vomit (red or black in color)    Feeling weak or dizzy    Fever of 100.4 F (38 C) or higher, or as directed by your healthcare provider  [employer] last reviewed this educational content on 3/1/2018    3055-4132 [employer]. [address] [place] [address], [place] [address]. All rights reserved. This information is not intended as a substitute for professional medical care. Always follow your healthcare professional's instructions.

## 2020-12-07 NOTE — NURSING NOTE
Prior to immunization administration, verified patients identity using patient s name and date of birth. Please see Immunization Activity for additional information.     Screening Questionnaire for Adult Immunization    Are you sick today?   No   Do you have allergies to medications, food, a vaccine component or latex?   No   Have you ever had a serious reaction after receiving a vaccination?   No   Do you have a long-term health problem with heart, lung, kidney, or metabolic disease (e.g., diabetes), asthma, a blood disorder, no spleen, complement component deficiency, a cochlear implant, or a spinal fluid leak?  Are you on long-term aspirin therapy?   No   Do you have cancer, leukemia, HIV/AIDS, or any other immune system problem?   No   Do you have a parent, brother, or sister with an immune system problem?   No   In the past 3 months, have you taken medications that affect  your immune system, such as prednisone, other steroids, or anticancer drugs; drugs for the treatment of rheumatoid arthritis, Crohn s disease, or psoriasis; or have you had radiation treatments?   No   Have you had a seizure, or a brain or other nervous system problem?   No   During the past year, have you received a transfusion of blood or blood    products, or been given immune (gamma) globulin or antiviral drug?   No   For women: Are you pregnant or is there a chance you could become       pregnant during the next month?   No   Have you received any vaccinations in the past 4 weeks?   No     Immunization questionnaire answers were all negative.        Per orders of Dr. Greenwood, injection of tdap given by Ashley Haddad MA. Patient instructed to remain in clinic for 15 minutes afterwards, and to report any adverse reaction to me immediately.       Screening performed by Ashley Haddad MA on 12/7/2020 at 11:57 AM.

## 2020-12-08 ENCOUNTER — TELEPHONE (OUTPATIENT)
Dept: FAMILY MEDICINE | Facility: CLINIC | Age: 60
End: 2020-12-08

## 2020-12-08 LAB — DEPRECATED CALCIDIOL+CALCIFEROL SERPL-MC: 47 UG/L (ref 20–75)

## 2020-12-08 NOTE — TELEPHONE ENCOUNTER
----- Message from Huong Greenwood MD sent at 12/7/2020  6:55 PM CST -----  Notify Mary Carmen that her iron level is very good, her thyroid is normal, her blood sugar is normal, and her cholesterol has risen a little bit but is still okay.  Her vitamin D level is not done yet.  Okay to send copy of her labs.  Huong Greenwood MD

## 2020-12-08 NOTE — LETTER
December 8, 2020      Mary Carmen Jaime  [address]  [place] [address]        Dear ,    We are writing to inform you of your test results.    Your test results fall within the expected range(s) or remain unchanged from previous results.  Please continue with current treatment plan.    Resulted Orders   Lipid panel reflex to direct LDL Fasting   Result Value Ref Range    Cholesterol 233 (H) <200 mg/dL      Comment:      Desirable:       <200 mg/dl    Triglycerides 119 <150 mg/dL      Comment:      Fasting specimen    HDL Cholesterol 80 >49 mg/dL    LDL Cholesterol Calculated 129 (H) <100 mg/dL      Comment:      Above desirable:  100-129 mg/dl  Borderline High:  130-159 mg/dL  High:             160-189 mg/dL  Very high:       >189 mg/dl      Non HDL Cholesterol 153 (H) <130 mg/dL      Comment:      Above Desirable:  130-159 mg/dl  Borderline high:  160-189 mg/dl  High:             190-219 mg/dl  Very high:       >219 mg/dl     TSH with free T4 reflex   Result Value Ref Range    TSH 2.35 0.40 - 4.00 mU/L   Ferritin   Result Value Ref Range    Ferritin 110 8 - 252 ng/mL   Glucose   Result Value Ref Range    Glucose 95 70 - 99 mg/dL      Comment:      Fasting specimen       If you have any questions or concerns, please call the clinic at the number listed above.       Sincerely,

## 2020-12-08 NOTE — RESULT ENCOUNTER NOTE
Notify Mary Carmen that her iron level is very good, her thyroid is normal, her blood sugar is normal, and her cholesterol has risen a little bit but is still okay.  Her vitamin D level is not done yet.  Okay to send copy of her labs.  Huong Greenwood MD

## 2020-12-09 LAB
COPATH REPORT: NORMAL
PAP: NORMAL

## 2020-12-11 LAB
FINAL DIAGNOSIS: NORMAL
HPV HR 12 DNA CVX QL NAA+PROBE: NEGATIVE
HPV16 DNA SPEC QL NAA+PROBE: NEGATIVE
HPV18 DNA SPEC QL NAA+PROBE: NEGATIVE
SPECIMEN DESCRIPTION: NORMAL
SPECIMEN SOURCE CVX/VAG CYTO: NORMAL

## 2021-01-05 ENCOUNTER — HOSPITAL ENCOUNTER (OUTPATIENT)
Dept: BONE DENSITY | Facility: CLINIC | Age: 61
End: 2021-01-05
Attending: FAMILY MEDICINE
Payer: COMMERCIAL

## 2021-01-05 ENCOUNTER — HOSPITAL ENCOUNTER (OUTPATIENT)
Dept: MAMMOGRAPHY | Facility: CLINIC | Age: 61
End: 2021-01-05
Attending: FAMILY MEDICINE
Payer: COMMERCIAL

## 2021-01-05 DIAGNOSIS — M85.89 OSTEOPENIA OF MULTIPLE SITES: ICD-10-CM

## 2021-01-05 DIAGNOSIS — Z12.31 ENCOUNTER FOR SCREENING MAMMOGRAM FOR MALIGNANT NEOPLASM OF BREAST: ICD-10-CM

## 2021-01-05 PROCEDURE — 77063 BREAST TOMOSYNTHESIS BI: CPT

## 2021-01-05 PROCEDURE — 77080 DXA BONE DENSITY AXIAL: CPT

## 2021-01-05 NOTE — LETTER
[facility]  [address] [facility] [id number] [phone]    March 21, 2021    Mary Carmen Jaime  [address]  [place] [address]        Dear Mary Carmen,    The result of your recent DEXA scan is abnormal.  The density of your bones is thinner than expected. This is called osteopenia when there is mild to moderate thinning and osteoporosis when there  is moderate to severe thinning. This may put you at risk for a fracture.    You have osteopenia of your hips, and you have osteopenia of your spine.    Compared to your previous DEXA scan, these results are slightly worse on the right hip and slightly better on the left hip. Your spine is slightly worse as well. Overall, still not severe enough to call osteoporosis.     To help prevent further loss of bone, the following is recommended:    Continue on your Calcium and Vitamin D. These are the building blocks for bones.     Exercise daily to keep your bones strong. Thirty minutes of moderate walking or other aerobic activity is recommended, along with light weight lifting or yard/garden/house work.     If you have any questions or concerns, please call myself or my nurse at [phone].          Sincerely,            Huong Greenwood MD

## 2021-04-02 ENCOUNTER — IMMUNIZATION (OUTPATIENT)
Dept: FAMILY MEDICINE | Facility: OTHER | Age: 61
End: 2021-04-02
Payer: COMMERCIAL

## 2021-04-02 PROCEDURE — 91300 PR COVID VAC PFIZER DIL RECON 30 MCG/0.3 ML IM: CPT

## 2021-04-02 PROCEDURE — 0001A PR COVID VAC PFIZER DIL RECON 30 MCG/0.3 ML IM: CPT

## 2021-04-23 ENCOUNTER — IMMUNIZATION (OUTPATIENT)
Dept: FAMILY MEDICINE | Facility: OTHER | Age: 61
End: 2021-04-23
Attending: FAMILY MEDICINE
Payer: COMMERCIAL

## 2021-04-23 PROCEDURE — 0002A PR COVID VAC PFIZER DIL RECON 30 MCG/0.3 ML IM: CPT

## 2021-04-23 PROCEDURE — 91300 PR COVID VAC PFIZER DIL RECON 30 MCG/0.3 ML IM: CPT

## 2021-11-08 ENCOUNTER — IMMUNIZATION (OUTPATIENT)
Dept: FAMILY MEDICINE | Facility: CLINIC | Age: 61
End: 2021-11-08
Payer: COMMERCIAL

## 2021-11-08 PROCEDURE — 90682 RIV4 VACC RECOMBINANT DNA IM: CPT

## 2021-11-08 PROCEDURE — 90471 IMMUNIZATION ADMIN: CPT

## 2022-01-19 NOTE — ED AVS SNAPSHOT
[facility] Emergency Department    [address] [place] [address]    Phone:  [phone]    Fax:  [phone]                                       Mary Carmen Jaime   MRN: [id number]    Department:  [facility] Emergency Department   Date of Visit:  3/11/2017           After Visit Summary Signature Page     I have received my discharge instructions, and my questions have been answered. I have discussed any challenges I see with this plan with the nurse or doctor.    ..........................................................................................................................................  Patient/Patient Representative Signature      ..........................................................................................................................................  Patient Representative Print Name and Relationship to Patient    ..................................................               ................................................  Date                                            Time    ..........................................................................................................................................  Reviewed by Signature/Title    ...................................................              ..............................................  Date                                                            Time           Social Work Consult    Name:Janeth Boyce  : 1959  MRN: [id number]    Referral:Community Resources     SW received consult from Nichole Mccauley, RN  Explaining that the patient was currently in the hospital and could use vanilla Boost prior to d/c. SW has one case of vanilla available.     SW left VM with patient [phone], attempted to call daughter, Kell, [phone], but person answered explained a wrong number. FERNANDA also left VM with patients sister, Ayan Garcia, [phone]. SW attempting to deliver case of boost to patient but want to ensure someone available to .    FERNANDA also requested 3 Vanilla Boost from Marivel Rojas LCSW, for future needs.

## 2022-02-15 ENCOUNTER — HOSPITAL ENCOUNTER (OUTPATIENT)
Dept: MAMMOGRAPHY | Facility: CLINIC | Age: 62
Discharge: HOME | End: 2022-02-15
Attending: FAMILY MEDICINE | Admitting: FAMILY MEDICINE
Payer: COMMERCIAL

## 2022-02-15 DIAGNOSIS — Z12.31 VISIT FOR SCREENING MAMMOGRAM: ICD-10-CM

## 2022-02-15 PROCEDURE — 77067 SCR MAMMO BI INCL CAD: CPT

## 2022-10-31 ENCOUNTER — IMMUNIZATION (OUTPATIENT)
Dept: FAMILY MEDICINE | Facility: CLINIC | Age: 62
End: 2022-10-31
Payer: COMMERCIAL

## 2022-10-31 PROCEDURE — 90682 RIV4 VACC RECOMBINANT DNA IM: CPT

## 2022-10-31 PROCEDURE — 90471 IMMUNIZATION ADMIN: CPT

## 2023-01-01 NOTE — NURSING NOTE
Screening Questionnaire for Adult Immunization    Are you sick today?   No   Do you have allergies to medications, food, a vaccine component or latex?   No   Have you ever had a serious reaction after receiving a vaccination?   No   Do you have a long-term health problem with heart disease, lung disease, asthma, kidney disease, metabolic disease (e.g. diabetes), anemia, or other blood disorder?   No   Do you have cancer, leukemia, HIV/AIDS, or any other immune system problem?   No   In the past 3 months, have you taken medications that affect  your immune system, such as prednisone, other steroids, or anticancer drugs; drugs for the treatment of rheumatoid arthritis, Crohn s disease, or psoriasis; or have you had radiation treatments?   No   Have you had a seizure, or a brain or other nervous system problem?   No   During the past year, have you received a transfusion of blood or blood     products, or been given immune (gamma) globulin or antiviral drug?   No   For women: Are you pregnant or is there a chance you could become        pregnant during the next month?   No   Have you received any vaccinations in the past 4 weeks?   No     Immunization questionnaire answers were all negative.        Per orders of Dr. POND, injection of shingrix given by Maira Krishnan. Patient instructed to remain in clinic for 15 minutes afterwards, and to report any adverse reaction to me immediately.       Screening performed by Maira Krishnan on 11/2/2018 at 10:07 AM.   No

## 2023-03-13 ENCOUNTER — HOSPITAL ENCOUNTER (OUTPATIENT)
Dept: MAMMOGRAPHY | Facility: CLINIC | Age: 63
Discharge: HOME | End: 2023-03-13
Attending: FAMILY MEDICINE | Admitting: FAMILY MEDICINE
Payer: COMMERCIAL

## 2023-03-13 DIAGNOSIS — Z12.31 VISIT FOR SCREENING MAMMOGRAM: ICD-10-CM

## 2023-03-13 PROCEDURE — 77067 SCR MAMMO BI INCL CAD: CPT

## 2023-04-28 ENCOUNTER — PATIENT OUTREACH (OUTPATIENT)
Dept: GASTROENTEROLOGY | Facility: CLINIC | Age: 63
End: 2023-04-28

## 2023-04-28 DIAGNOSIS — Z12.11 SPECIAL SCREENING FOR MALIGNANT NEOPLASMS, COLON: Primary | ICD-10-CM

## 2023-04-28 NOTE — LETTER
April 28, 2023      Mary Carmen Jaime  [id number]  [place] [address]    Sadie Lentz,    We hope this letter finds you doing well.   Your health is important to us at [facility]. Our records indicate that you could be due, or possibly overdue, for a screening or surveillance colonoscopy.     An order has been placed for you to have this completed. Our scheduling team will be reaching out to you to assist in getting this scheduled. If you do not hear from them within 7 days or you would like to schedule sooner, please call [phone], option 2 for endoscopy scheduling.     If you have questions about this order or have further concerns, please reach out to your primary care provider.     Manuelito,     Colorectal Cancer RN Screening Team  [facility]

## 2023-06-12 ENCOUNTER — TELEPHONE (OUTPATIENT)
Dept: GASTROENTEROLOGY | Facility: CLINIC | Age: 63
End: 2023-06-12

## 2023-06-12 NOTE — TELEPHONE ENCOUNTER
"Endoscopy Scheduling Screen    Have you had a positive Covid test in the last 14 days?  No    Are you active on MyChart?   No    What insurance is in the chart?  BC/BS: Schedule in [facility] unless patient meets exclusion criteria.     Ordering/Referring Provider: BK JONES   (If ordering provider performs procedure, schedule with ordering provider unless otherwise instructed. )    BMI: Estimated body mass index is 28.63 kg/m  as calculated from the following:    Height as of 12/7/20: 1.56 m (5' 1.42\").    Weight as of 12/7/20: 69.7 kg (153 lb 9.6 oz).     Sedation Ordered  MAC/deep sedation.   BMI<= 45 45 < BMI <= 48 48 < BMI < = 50  BMI > 50   No Restrictions No [facility]  No [facility]  [facility] with exceptions Hospital Only OR Only       Do you have a history of malignant hyperthermia or adverse reaction to anesthesia?  No    (Females) Are you currently pregnant?        Have you been diagnosed or told you have pulmonary hypertension?   No    Do you have an LVAD?  No    Have you been told you have moderate to severe sleep apnea?  No    Have you been told you have COPD, asthma, or any other lung disease?  No    Do you have any heart conditions?  No     Have you ever had or are you awaiting a heart or lung transplant?   No    Have you had a stroke or transient ischemic attack (TIA aka \"mini stroke\" in the last 6 months?   No    Have you been diagnosed with or been told you have cirrhosis of the liver?   No    Are you currently on dialysis?   No    Do you need assistance transferring?   No    BMI: Estimated body mass index is 28.63 kg/m  as calculated from the following:    Height as of 12/7/20: 1.56 m (5' 1.42\").    Weight as of 12/7/20: 69.7 kg (153 lb 9.6 oz).     Is patients BMI > 40 and scheduling location [facility]?  No    Do you take the medication Phentermine, Ozempic or Wegovy?  No    Do you take the medication Naltrexone?  No    Do you take blood thinners?  No    Preferred Pharmacy:  [facility] [place]      Prep   Are you currently on dialysis or do you have chronic kidney disease?  No (standard prep)    Do you have a diagnosis of diabetes?  No    Do you have a diagnosis of cystic fibrosis (CF)?  No    On a regular basis do you go 3 -5 days between bowel movements?  No    BMI > 40?  No    Final Scheduling Details   Colonoscopy prep sent?  MiraLAX (No Mag Citrate)    Procedure scheduled  COLON    Surgeon:  RUTH     Date of procedure:  9/11     Schedule PAC:   No    Location  [facility] - [facility]    Sedation   MAC/Deep Sedation    Patient Reminders:    You will receive a call from a Nurse to review instructions and health history.  This assessment must be completed prior to your procedure.  Failure to complete the Nurse assessment may result in the procedure being cancelled.       On the day of your procedure, please designate an adult(s) who can drive you home stay with you for the next 24 hours. The medicines used in the exam will make you sleepy. You will not be able to drive.       You cannot take public transportation, ride share services, or non-medical taxi service without a responsible caregiver.  Medical transport services are allowed with the requirement that a responsible caregiver will receive you at your destination.  We require that drivers and caregivers are confirmed prior to your procedure.

## 2023-09-09 NOTE — H&P
[facility] History and Physical    Mary Carmen Jaime MRN# [id number]   Age: 62 year old YOB: 1960     Date of Admission:  (Not on file)    Home clinic: [facility]  Primary care provider: Huong Greenwood          Impression and Plan:   Impression:   Special screening for malignant neoplasms, colon [Z12.11]  History of polyps  Last colonoscopy 2020-multiple polyps      Plan:   Proceed to Colonoscopy as planned.  The procedure, risks(bleeding, perforation), benefits and alternatives were discussed and the patient agrees to proceed. Cleared for Anesthesia             Chief Complaint:   Special screening for malignant neoplasms, colon [Z12.11]    History is obtained from the patient          History of Present Illness:   This 62 year old female is being seen at this time for evaluation for colonoscopy.  No complaints.  No family hx           Past Medical History:     Past Medical History:   Diagnosis Date    ASCUS on Pap smear 8/7/2011    10/15/09 pap ASCUS neg HPV. Plan-- repeat screening pap smear in one year (due 10/15/2010) Pap 11/10/10 NIL.  Repeat 3 years.       Complication of anesthesia     PONV (postoperative nausea and vomiting)             Past Surgical History:     Past Surgical History:   Procedure Laterality Date    COLONOSCOPY  01/10/2011    normal repeat 10 years    COLONOSCOPY N/A 02/18/2020    tubular adenoma repeat 3-5 years    LAPAROSCOPIC TUBAL LIGATION  03/09/2012    Procedure:LAPAROSCOPIC TUBAL LIGATION; Laparoscopic Tubal; Surgeon:RUPAL FREEMAN; Location:[facility]    RELEASE CARPAL TUNNEL Right 06/24/2016    Procedure: RELEASE CARPAL TUNNEL;  Surgeon: Parmjit Peña DO;  Location: [facility] OR    RELEASE CARPAL TUNNEL Left 10/21/2016    Procedure: RELEASE CARPAL TUNNEL;  Surgeon: Parmjit Peña DO;  Location: [facility] OR    RELEASE TRIGGER FINGER Right 06/24/2016    Procedure: RELEASE TRIGGER FINGER;  Surgeon: Parmjit Peña  DO M[place];  Location:  OR    [facility] APPENDECTOMY  01/01/1996            Social History:     Social History     Tobacco Use    Smoking status: Never    Smokeless tobacco: Never   Substance Use Topics    Alcohol use: Yes     Alcohol/week: 0.0 standard drinks of alcohol     Comment: 0-3 drinks per week            Family History:     Family History   Problem Relation Age of Onset    Diabetes Father         AOD    Cancer Father         salivary glands    Heart Disease Father         1st MI in 40's    Diabetes Sister         JOD    Cancer Paternal Uncle         Prostate    Genitourinary Problems Sister         kidney abnormality, hypertrophy and rupture            Immunizations:     VACCINE/DOSE   Diptheria   DPT   DTAP   HBIG   Hepatitis A   Hepatitis B   HIB   Influenza   Measles   Meningococcal   MMR   Mumps   Pneumococcal   Polio   Rubella   Small Pox   TDAP   Varicella   Zoster            Allergies:     Allergies   Allergen Reactions    Cephalosporins Rash    Amoxicillin Rash    Penicillins Rash            Medications:     No current facility-administered medications for this encounter.     Current Outpatient Medications   Medication Sig    B Complex Vitamins (B COMPLEX PO) Take  by mouth. 1 daily    CALCIUM & MAGNESIUM CARBONATES PO Take  by mouth. WITH ZINC:1 TABLET ONCE A DAY    Cinnamon 500 MG CAPS Take  by mouth. 1 TABLET ONCE A DAY    Digestive Enzymes (MULTI-ENZYME PO) Take 1 capsule by mouth daily     GELATIN 1 ONCE A DAY    Green Tea, Camillia sinensis, (GREEN TEA PO) Take  by mouth. 1 TABLET ONCE A DAY    loperamide (IMODIUM A-D) 2 MG tablet Take 1 tablet (2 mg) by mouth 4 times daily as needed for diarrhea    MOTRIN IB PO Take 400 mg by mouth every 4 hours as needed for moderate pain    Multiple Vitamins-Minerals (OCUVITE ADULT 50+) CAPS Take 1 capsule daily    MULTIVITAMIN OR 1 tablet daily    Probiotic Product (ACIDOPHILUS PROBIOTIC BLEND) CAPS 1 OTC tab daily    Turmeric 500 MG CAPS Take 1 capsule  (500 mg) by mouth daily    VITAMIN D, CHOLECALCIFEROL, PO Take 2,000 Units by mouth daily             Review of Systems:   The review of systems was positive for the following findings.  None.  The remainder of the review of systems was unremarkable.          Physical Exam:   All vitals have been reviewed  Last menstrual period 11/01/2013, not currently breastfeeding.  No intake or output data in the 24 hours ending 09/09/23 1023  SHEENT examination revealed NC/AT, EOMI.  Examination of the chest revealed CTA.  Examination of the heart revealed RRR.  Examination of the abdomen revealed soft, non tender.  The neuromuscular examination was NL.          Data:   All laboratory data reviewed  No results found for any visits on 09/11/23.  -     Osvaldo Mcclelland MD, FACS

## 2023-09-11 ENCOUNTER — HOSPITAL ENCOUNTER (OUTPATIENT)
Facility: CLINIC | Age: 63
Discharge: HOME | End: 2023-09-11
Attending: SPECIALIST | Admitting: SPECIALIST
Payer: COMMERCIAL

## 2023-09-11 ENCOUNTER — ANESTHESIA (OUTPATIENT)
Dept: GASTROENTEROLOGY | Facility: CLINIC | Age: 63
End: 2023-09-11
Payer: COMMERCIAL

## 2023-09-11 ENCOUNTER — ANESTHESIA EVENT (OUTPATIENT)
Dept: GASTROENTEROLOGY | Facility: CLINIC | Age: 63
End: 2023-09-11
Payer: COMMERCIAL

## 2023-09-11 VITALS
RESPIRATION RATE: 16 BRPM | TEMPERATURE: 98.3 F | DIASTOLIC BLOOD PRESSURE: 65 MMHG | OXYGEN SATURATION: 98 % | HEART RATE: 52 BPM | SYSTOLIC BLOOD PRESSURE: 99 MMHG

## 2023-09-11 LAB — COLONOSCOPY: NORMAL

## 2023-09-11 PROCEDURE — 250N000009 HC RX 250: Performed by: SPECIALIST

## 2023-09-11 PROCEDURE — 258N000003 HC RX IP 258 OP 636: Performed by: NURSE ANESTHETIST, CERTIFIED REGISTERED

## 2023-09-11 PROCEDURE — 250N000009 HC RX 250: Performed by: NURSE ANESTHETIST, CERTIFIED REGISTERED

## 2023-09-11 PROCEDURE — G0121 COLON CA SCRN NOT HI RSK IND: HCPCS | Performed by: SPECIALIST

## 2023-09-11 PROCEDURE — 370N000017 HC ANESTHESIA TECHNICAL FEE, PER MIN: Performed by: SPECIALIST

## 2023-09-11 PROCEDURE — G0105 COLORECTAL SCRN; HI RISK IND: HCPCS | Performed by: SPECIALIST

## 2023-09-11 PROCEDURE — 250N000011 HC RX IP 250 OP 636: Performed by: NURSE ANESTHETIST, CERTIFIED REGISTERED

## 2023-09-11 PROCEDURE — 45378 DIAGNOSTIC COLONOSCOPY: CPT | Performed by: SPECIALIST

## 2023-09-11 RX ORDER — PROPOFOL 10 MG/ML
INJECTION, EMULSION INTRAVENOUS CONTINUOUS PRN
Status: DISCONTINUED | OUTPATIENT
Start: 2023-09-11 | End: 2023-09-11

## 2023-09-11 RX ORDER — LIDOCAINE 40 MG/G
CREAM TOPICAL
Status: DISCONTINUED | OUTPATIENT
Start: 2023-09-11 | End: 2023-09-11 | Stop reason: HOSPADM

## 2023-09-11 RX ORDER — SODIUM CHLORIDE, SODIUM LACTATE, POTASSIUM CHLORIDE, CALCIUM CHLORIDE 600; 310; 30; 20 MG/100ML; MG/100ML; MG/100ML; MG/100ML
INJECTION, SOLUTION INTRAVENOUS CONTINUOUS
Status: DISCONTINUED | OUTPATIENT
Start: 2023-09-11 | End: 2023-09-11 | Stop reason: HOSPADM

## 2023-09-11 RX ORDER — LIDOCAINE HYDROCHLORIDE 10 MG/ML
INJECTION, SOLUTION INFILTRATION; PERINEURAL PRN
Status: DISCONTINUED | OUTPATIENT
Start: 2023-09-11 | End: 2023-09-11

## 2023-09-11 RX ORDER — PROPOFOL 10 MG/ML
INJECTION, EMULSION INTRAVENOUS PRN
Status: DISCONTINUED | OUTPATIENT
Start: 2023-09-11 | End: 2023-09-11

## 2023-09-11 RX ADMIN — PROPOFOL 50 MG: 10 INJECTION, EMULSION INTRAVENOUS at 08:06

## 2023-09-11 RX ADMIN — LIDOCAINE HYDROCHLORIDE 1 ML: 10 INJECTION, SOLUTION EPIDURAL; INFILTRATION; INTRACAUDAL; PERINEURAL at 07:32

## 2023-09-11 RX ADMIN — SODIUM CHLORIDE, POTASSIUM CHLORIDE, SODIUM LACTATE AND CALCIUM CHLORIDE 10 ML/HR: 600; 310; 30; 20 INJECTION, SOLUTION INTRAVENOUS at 07:32

## 2023-09-11 RX ADMIN — LIDOCAINE HYDROCHLORIDE 50 MG: 10 INJECTION, SOLUTION INFILTRATION; PERINEURAL at 08:05

## 2023-09-11 RX ADMIN — PROPOFOL 50 MG: 10 INJECTION, EMULSION INTRAVENOUS at 08:05

## 2023-09-11 RX ADMIN — PROPOFOL 200 MCG/KG/MIN: 10 INJECTION, EMULSION INTRAVENOUS at 08:05

## 2023-09-11 ASSESSMENT — ACTIVITIES OF DAILY LIVING (ADL)
ADLS_ACUITY_SCORE: 35
ADLS_ACUITY_SCORE: 35

## 2023-09-11 NOTE — ANESTHESIA POSTPROCEDURE EVALUATION
Patient: Mary Carmen Jaime    Procedure: Procedure(s):  Colonoscopy       Anesthesia Type:  MAC    Note:  Disposition: Outpatient   Postop Pain Control: Uneventful            Sign Out: Well controlled pain   PONV: No   Neuro/Psych: Uneventful            Sign Out: Acceptable/Baseline neuro status   Airway/Respiratory: Uneventful            Sign Out: Acceptable/Baseline resp. status   CV/Hemodynamics: Uneventful            Sign Out: Acceptable CV status   Other NRE: NONE   DID A NON-ROUTINE EVENT OCCUR? No    Event details/Postop Comments:  Pt was happy with anesthesia care.  No complications.  I will follow up with the pt if needed.           Last vitals:  Vitals Value Taken Time   BP 99/65 09/11/23 0840   Temp     Pulse 52 09/11/23 0840   Resp     SpO2 97 % 09/11/23 0848   Vitals shown include unvalidated device data.    Electronically Signed By: DOT Alicea CRNA  September 11, 2023  8:50 AM

## 2023-09-11 NOTE — DISCHARGE INSTRUCTIONS
[facility]    Home Care Following Endoscopy          Activity:  You have just undergone an endoscopic procedure usually performed with conscious sedation.  Do not work or operate machinery (including a car) for at least 12 hours.        Diet:  Return to the diet you were on before your procedure but eat lightly for the first 12-24 hours.  Drink plenty of water.  Resume any regular medications unless otherwise advised by your physician.  Please begin any new medication prescribed as a result of your procedure as directed by your physician.      Pain:  You may take Tylenol as needed for pain.  Expected Recovery:  You can expect some mild abdominal fullness and/or discomfort due to the air used to inflate your intestinal tract. I encourage you to walk and attempt to pass this air as soon as possible.    Call Your Physician if You Have:    After Colonoscopy:  Worsening persisting abdominal pain which is worse with activity.  Fevers (>101 degrees F), chills or shakes.  Passage of continued blood with bowel movements.     Any questions or concerns about your recovery, please call [phone] or after hours [phone] ([phone]) Nurse Advice Line.    Follow-up Care:  You did NOT have polyps/biopsy tissue sample(s) removed.

## 2023-09-11 NOTE — ANESTHESIA CARE TRANSFER NOTE
Patient: Mary Carmen Jaime    Procedure: Procedure(s):  Colonoscopy       Diagnosis: Special screening for malignant neoplasms, colon [Z12.11]  Diagnosis Additional Information: No value filed.    Anesthesia Type:   MAC     Note:    Oropharynx: oropharynx clear of all foreign objects and spontaneously breathing  Level of Consciousness: drowsy  Oxygen Supplementation: face mask    Independent Airway: airway patency satisfactory and stable  Dentition: dentition unchanged  Vital Signs Stable: post-procedure vital signs reviewed and stable  Report to RN Given: handoff report given  Patient transferred to: Phase II    Handoff Report: Identifed the Patient, Identified the Reponsible Provider, Reviewed the pertinent medical history, Discussed the surgical course, Reviewed Intra-OP anesthesia mangement and issues during anesthesia, Set expectations for post-procedure period and Allowed opportunity for questions and acknowledgement of understanding      Vitals:  Vitals Value Taken Time   BP 99/65 09/11/23 0840   Temp     Pulse 52 09/11/23 0840   Resp     SpO2 99 % 09/11/23 0844   Vitals shown include unvalidated device data.    Electronically Signed By: DOT Alicea CRNA  September 11, 2023  8:45 AM

## 2023-09-11 NOTE — ANESTHESIA PREPROCEDURE EVALUATION
Anesthesia Pre-Procedure Evaluation    Patient: Mary Carmen Jaime   MRN: [id number] : 1960        Procedure : Procedure(s):  Colonoscopy          Past Medical History:   Diagnosis Date    ASCUS on Pap smear 2011    10/15/09 pap ASCUS neg HPV. Plan-- repeat screening pap smear in one year (due 10/15/2010) Pap 11/10/10 NIL.  Repeat 3 years.       Complication of anesthesia     PONV (postoperative nausea and vomiting)       Past Surgical History:   Procedure Laterality Date    COLONOSCOPY  01/10/2011    normal repeat 10 years    COLONOSCOPY N/A 2020    tubular adenoma repeat 3-5 years    LAPAROSCOPIC TUBAL LIGATION  2012    Procedure:LAPAROSCOPIC TUBAL LIGATION; Laparoscopic Tubal; Surgeon:RUPAL FREEMAN; Location:[facility]    RELEASE CARPAL TUNNEL Right 2016    Procedure: RELEASE CARPAL TUNNEL;  Surgeon: Parmjit Peña DO;  [facility]    RELEASE CARPAL TUNNEL Left 10/21/2016    Procedure: RELEASE CARPAL TUNNEL;  Surgeon: Parmjit Peña DO;  Location[facility] OR    RELEASE TRIGGER FINGER Right 2016    Procedure: RELEASE TRIGGER FINGER;  Surgeon: Parmjit Peña DO;  Location[facility] OR    [facility] APPENDECTOMY  1996      Allergies   Allergen Reactions    Cephalosporins Rash    Amoxicillin Rash    Penicillins Rash      Social History     Tobacco Use    Smoking status: Never    Smokeless tobacco: Never   Substance Use Topics    Alcohol use: Yes     Alcohol/week: 0.0 standard drinks of alcohol     Comment: 0-3 drinks per week      Wt Readings from Last 1 Encounters:   20 69.7 kg (153 lb 9.6 oz)        Anesthesia Evaluation   Pt has had prior anesthetic. Type: MAC and General.    History of anesthetic complications  - PONV.      ROS/MED HX  ENT/Pulmonary:  - neg pulmonary ROS     Neurologic:  - neg neurologic ROS     Cardiovascular:  - neg cardiovascular ROS   (+)  - -   -  - -                                 Previous cardiac testing   Echo: Date:  Results:    Stress Test:  Date: 3/28/18 Results:  Stress  Exercise was stopped due to dyspnea.  There was a normal BP response to exercise.  Target Heart Rate was achieved.  There was a maximum 0.5mm ST segment depression in the inferior lead(s).  There was a maximum 0.5mm ST segment depression in the lateral lead(s).  This was a normal stress echocardiogram with no evidence of stress-induced  ischemia.  The visual ejection fraction is estimated at >70%.  Left ventricular cavity size decreases with exercise.  Global LV systolic function augments with exercise.  Normal resting wall motion and no stress-induced wall motion abnormality.     Baseline  The patient is in normal sinus rhythm.  Resting ECG is normal.  The visual ejection fraction is estimated at 55-60%.  No regional wall motion abnormalities noted.    ECG Reviewed:  Date: 3/28/18 Results:  SR  Cath:  Date: Results:      METS/Exercise Tolerance: >4 METS    Hematologic:  - neg hematologic  ROS     Musculoskeletal:  - neg musculoskeletal ROS     GI/Hepatic:  - neg GI/hepatic ROS     Renal/Genitourinary:  - neg Renal ROS     Endo:  - neg endo ROS     Psychiatric/Substance Use:  - neg psychiatric ROS     Infectious Disease:  - neg infectious disease ROS     Malignancy:  - neg malignancy ROS     Other:  - neg other ROS          Physical Exam    Airway  airway exam normal      Mallampati: II   TM distance: > 3 FB   Neck ROM: full   Mouth opening: > 3 cm    Respiratory Devices and Support         Dental  no notable dental history         Cardiovascular   cardiovascular exam normal       Rhythm and rate: regular and normal     Pulmonary   pulmonary exam normal        breath sounds clear to auscultation           OUTSIDE LABS:  CBC:   Lab Results   Component Value Date    WBC 9.1 03/26/2018    WBC 9.8 09/23/2003    HGB 13.9 03/26/2018    HGB 14.2 08/17/2007    HCT 41.9 03/26/2018     03/26/2018     BMP:   Lab Results   Component Value Date     03/26/2018     POTASSIUM 3.9 03/26/2018    CHLORIDE 104 03/26/2018    CO2 29 03/26/2018    BUN 18 03/26/2018    CR 0.76 03/26/2018    CR 1.00 08/17/2007    GLC 95 12/07/2020    GLC 88 03/26/2018     COAGS: No results found for: PTT, INR, FIBR  POC:   Lab Results   Component Value Date    HCG Negative 03/09/2012     HEPATIC: No results found for: ALBUMIN, PROTTOTAL, ALT, AST, GGT, ALKPHOS, BILITOTAL, BILIDIRECT, TORI  OTHER:   Lab Results   Component Value Date    PH 5.5 01/20/2003    SHIV 9.4 03/26/2018    TSH 2.35 12/07/2020       Anesthesia Plan    ASA Status:  2    NPO Status:  NPO Appropriate    Anesthesia Type: MAC.     - Reason for MAC: Deep or markedly invasive procedure (G8)   Induction: Intravenous.   Maintenance: TIVA.        Consents    Anesthesia Plan(s) and associated risks, benefits, and realistic alternatives discussed. Questions answered and patient/representative(s) expressed understanding.     - Discussed:     - Discussed with:  Patient      - Extended Intubation/Ventilatory Support Discussed: No.      - Patient is DNR/DNI Status: No     Use of blood products discussed: Yes.     - Discussed with: Patient.     - Consented: consented to blood products            Reason for refusal: other.     Postoperative Care    Pain management: Multi-modal analgesia.   PONV prophylaxis: Background Propofol Infusion     Comments:    Other Comments: The risks and benefits of anesthesia, and the alternatives where applicable, have been discussed with the patient, and they wish to proceed.              DOT Alicea CRNA

## 2024-03-25 ENCOUNTER — HOSPITAL ENCOUNTER (OUTPATIENT)
Dept: MAMMOGRAPHY | Facility: CLINIC | Age: 64
Discharge: HOME | End: 2024-03-25
Attending: FAMILY MEDICINE | Admitting: FAMILY MEDICINE
Payer: COMMERCIAL

## 2024-03-25 DIAGNOSIS — Z12.31 VISIT FOR SCREENING MAMMOGRAM: ICD-10-CM

## 2024-03-25 PROCEDURE — 77063 BREAST TOMOSYNTHESIS BI: CPT

## 2024-05-28 ENCOUNTER — NURSE TRIAGE (OUTPATIENT)
Dept: FAMILY MEDICINE | Facility: CLINIC | Age: 64
End: 2024-05-28
Payer: COMMERCIAL

## 2024-05-28 DIAGNOSIS — N95.0 POSTMENOPAUSAL BLEEDING: Primary | ICD-10-CM

## 2024-05-28 NOTE — TELEPHONE ENCOUNTER
Reason for Call:  Appointment Request    Patient requesting this type of appt:  acute    Requested provider: Huong Greenwood    Reason patient unable to be scheduled: Not within requested timeframe    When does patient want to be seen/preferred time:  asap    Comments: Pt has been experiencing vaginal bleeding since 05/25/24. Would like to see PCP, but willing to see and female provider    Okay to leave a detailed message?: Yes at Cell number on file:    Telephone Information:   Mobile [phone]       Call taken on 5/28/2024 at 8:39 AM by Viviana Huddleston

## 2024-05-28 NOTE — TELEPHONE ENCOUNTER
Patient notified of providers advice and appointment made.  Madonna Zuñiga RN on 5/28/2024 at 12:26 PM

## 2024-05-28 NOTE — TELEPHONE ENCOUNTER
Nurse Triage SBAR    Is this a 2nd Level Triage? NO    Situation: Patient reports she started having vaginal bleeding on 5/25/24. She denies any cramping, abdominal pain, nausea or dizziness. She reports the first day it was bright red and moderate and the past 3 days it has been more light but she still has to use menstrual products. She has not had a period in over 10 years.    Background: Has been in menopause in 10+ years    Assessment: Patient will need a work up in clinic with PCP. She will call back if she starts having pain or dizziness or increased bleeding.    Protocol Recommended Disposition:   See in Office Within 2 Weeks    Recommendation: Can patient be seen sometime next week? She is out of town this week. Please advise when she can be placed  on schedule.     Routed to provider    Does the patient meet one of the following criteria for ADS visit consideration? No      Reason for Disposition   Postmenopausal vaginal bleeding    Additional Information   Negative: Shock suspected (e.g., cold/pale/clammy skin, too weak to stand, low BP, rapid pulse)   Negative: Difficult to awaken or acting confused (e.g., disoriented, slurred speech)   Negative: Passed out (i.e., lost consciousness, collapsed and was not responding)   Negative: Sounds like a life-threatening emergency to the triager   Negative: Followed a genital area injury (e.g., vagina, vulva)   Negative: Vaginal discharge is main symptom and small amount of blood   Negative: SEVERE abdominal pain   Negative: SEVERE dizziness (e.g., unable to stand, requires support to walk, feels like passing out now)   Negative: Sexual assault or rape (sexual intercourse or activity occurs without freely given consent), known or suspected   Negative: SEVERE vaginal bleeding (e.g., soaking 2 pads or tampons per hour and present 2 or more hours; 1 menstrual cup every 2 hours)   Negative: Patient sounds very sick or weak to the triager   Negative: MODERATE vaginal  bleeding (e.g., soaking 1 pad or tampon per hour and present > 6 hours; 1 menstrual cup every 6 hours)   Negative: Pale skin (pallor) of new-onset or worsening   Negative: Constant abdominal pain and present > 2 hours   Negative: Taking Coumadin (warfarin) or other strong blood thinner, or known bleeding disorder (e.g., thrombocytopenia)   Negative: Bleeding with > 6 soaked pads or tampons per day   Negative: Bleeding lasts for > 7 days   Negative: Bleeding or spotting after procedure (e.g., biopsy) or pelvic examination (e.g., pap smear) that lasts > 7 days   Negative: Patient wants to be seen    Protocols used: Vaginal Bleeding - [id number]

## 2024-05-28 NOTE — TELEPHONE ENCOUNTER
I would like her to start with a pelvic ultrasound and then see me in clinic for a pelvic exam, I can see her at 11:00 on Monday Ronit 10. Will then discuss further workup at that time.   Huong Greenwood MD

## 2024-06-03 ENCOUNTER — HOSPITAL ENCOUNTER (OUTPATIENT)
Dept: ULTRASOUND IMAGING | Facility: CLINIC | Age: 64
Discharge: HOME | End: 2024-06-03
Attending: FAMILY MEDICINE | Admitting: FAMILY MEDICINE
Payer: COMMERCIAL

## 2024-06-03 DIAGNOSIS — N95.0 POSTMENOPAUSAL BLEEDING: ICD-10-CM

## 2024-06-03 PROCEDURE — 76830 TRANSVAGINAL US NON-OB: CPT

## 2024-06-10 ENCOUNTER — OFFICE VISIT (OUTPATIENT)
Dept: FAMILY MEDICINE | Facility: CLINIC | Age: 64
End: 2024-06-10
Payer: COMMERCIAL

## 2024-06-10 VITALS
BODY MASS INDEX: 28.77 KG/M2 | TEMPERATURE: 97.5 F | SYSTOLIC BLOOD PRESSURE: 122 MMHG | WEIGHT: 152.4 LBS | RESPIRATION RATE: 12 BRPM | OXYGEN SATURATION: 100 % | HEART RATE: 66 BPM | HEIGHT: 61 IN | DIASTOLIC BLOOD PRESSURE: 70 MMHG

## 2024-06-10 DIAGNOSIS — N95.0 POSTMENOPAUSAL BLEEDING: Primary | ICD-10-CM

## 2024-06-10 DIAGNOSIS — Z12.4 CERVICAL CANCER SCREENING: ICD-10-CM

## 2024-06-10 PROCEDURE — 99203 OFFICE O/P NEW LOW 30 MIN: CPT | Performed by: FAMILY MEDICINE

## 2024-06-10 PROCEDURE — 87624 HPV HI-RISK TYP POOLED RSLT: CPT | Performed by: FAMILY MEDICINE

## 2024-06-10 PROCEDURE — G0145 SCR C/V CYTO,THINLAYER,RESCR: HCPCS | Performed by: FAMILY MEDICINE

## 2024-06-10 ASSESSMENT — PAIN SCALES - GENERAL: PAINLEVEL: NO PAIN (0)

## 2024-06-10 NOTE — PROGRESS NOTES
"  Assessment & Plan       ICD-10-CM    1. Postmenopausal bleeding  N95.0 US Pelvic Complete with Transvaginal      2. Cervical cancer screening  Z12.4 Pap Screen with HPV - Recommended Age 30 - 65 Years     Gynecologic Cytology (PAP)           Mary Carmen is a 63-year-old postmenopausal woman who had a significant amount of postmenopausal bleeding in the past few weeks.  She came in today for further evaluation.  In the interim the bleeding has stopped, she has not had any for least few days.  Attempt at endometrial biopsy was unsuccessful today.  I talked with her about the option of performing endometrial biopsy with cervical dilation in the operating room under anesthesia, versus conservative management with follow-up ultrasound.  I did review her case with OB/GYN and because of her normal ultrasound we agreed that rechecking her in 3 months with a pelvic ultrasound is reasonable.  The risk of malignancy is almost 0 and the risk of hyperplasia is very small based on her endometrial stripe thickness of 3 mm.  If that changes and it increases over 3 months then we will proceed with endometrial biopsy under anesthesia.  Also if she has recurrent bleeding she needs to follow-up sooner.    Will notify with Pap smear results and follow-up per ACOG guidelines.    BMI  Estimated body mass index is 28.89 kg/m  as calculated from the following:    Height as of this encounter: 1.547 m (5' 0.9\").    Weight as of this encounter: 69.1 kg (152 lb 6.4 oz).       Huong Greenwood MD     Subjective   Mary Carmen is a 63 year old, presenting for the following health issues:  Vaginal Bleeding        6/10/2024    10:41 AM   Additional Questions   Roomed by Eliane GREENWOOD     History of Present Illness       Reason for visit:  Vaginal bleeding  Symptom onset:  1-2 weeks ago  Symptoms include:  Bleeding  Symptom intensity:  Mild  Symptom progression:  Improving  Had these symptoms before:  No  What makes it worse:  Symptoms like a " "period  What makes it better:  Not really    She eats 0-1 servings of fruits and vegetables daily.She consumes 0 sweetened beverage(s) daily.She exercises with enough effort to increase her heart rate 10 to 19 minutes per day.  She exercises with enough effort to increase her heart rate 4 days per week.   She is taking medications regularly.       Patient reports she started having vaginal bleeding on 24. She denies any cramping, abdominal pain, nausea or dizziness. She reports the first day it was bright red and moderate and the next 3 days it has been more light but she still has to use menstrual products. She has not had a period in over 10 years. She bled for about 12 days and it stopped about 3 days ago.   ,  x 2. No abnormal paps. She is due for pap. She's certain it wasn't rectal or skin bleeding. She did have some cramps along with it. She also felt something of a lump in the front by the clitoris but that is gone now.     Background: Has been in menopause in 10+ years    She had a pelvic ultrasound on 6/3/24:  FINDINGS:     UTERUS: 7 x 4.5 x 3.4 cm. Normal in size and position with no masses.  ENDOMETRIUM: 3 mm. Normal smooth endometrium.  RIGHT OVARY: 10 x 9 x 8 cm. Normal with flow demonstrated.  LEFT OVARY: 18 x 11 x 6 cm. Normal with flow demonstrated.  No significant free fluid.                                                                    IMPRESSION:  1.  Normal pelvic ultrasound.    7 pt ROS is otherwise negative except as noted in HPI.        Objective    /70   Pulse 66   Temp 97.5  F (36.4  C) (Temporal)   Resp 12   Ht 1.547 m (5' 0.9\")   Wt 69.1 kg (152 lb 6.4 oz)   LMP 2013   SpO2 100%   BMI 28.89 kg/m    Body mass index is 28.89 kg/m .  Physical Exam   Vitals noted.  Patient alert, oriented, and in no acute distress.   CV:  RRR without murmur.   Respiratory:  Lungs clear to auscultation bilaterally.   Abdomen:  Soft, nontender, nondistended with good bowel " sounds and no masses or hepatosplenomegaly.   Vaginal exam reveals normal external and internal genitalia.  Cervix is closed, long and thick.  No lesions or abnormalities seen.  Pap co-test collected. Bimanual exam reveals a nontender, nongravid uterus with no CMT.  No adnexal masses or tenderness.       We discussed further evaluation for postmenopausal bleeding.  I recommended an endometrial biopsy and discussed the pros and cons and the procedure at length.  I discussed with her the risk that her cervix may be stenotic because of postmenopausal status, but with recent bleeding it is feasible that this is not the case.  Also discussed the possibility of malignancy, hyperplasia which is potentially premalignant, or normal findings.  Informed consent was obtained.    A sterile speculum was placed in the vagina.  The cervix and vaginal walls were cleansed using betadine. A single tooth tenaculum was placed at the 12:00 position.  The uterus was unable to be sounded due to stenosis.          Signed Electronically by: Huong Greenwood MD

## 2024-06-11 PROBLEM — N95.0 POSTMENOPAUSAL BLEEDING: Status: ACTIVE | Noted: 2024-06-11

## 2024-06-11 LAB
HPV HR 12 DNA CVX QL NAA+PROBE: NEGATIVE
HPV16 DNA CVX QL NAA+PROBE: NEGATIVE
HPV18 DNA CVX QL NAA+PROBE: NEGATIVE
HUMAN PAPILLOMA VIRUS FINAL DIAGNOSIS: NORMAL

## 2024-06-14 LAB
BKR LAB AP GYN ADEQUACY: NORMAL
BKR LAB AP GYN INTERPRETATION: NORMAL
BKR LAB AP PREVIOUS ABNORMAL: NORMAL
PATH REPORT.COMMENTS IMP SPEC: NORMAL
PATH REPORT.COMMENTS IMP SPEC: NORMAL
PATH REPORT.RELEVANT HX SPEC: NORMAL

## 2024-09-09 ENCOUNTER — HOSPITAL ENCOUNTER (OUTPATIENT)
Dept: ULTRASOUND IMAGING | Facility: CLINIC | Age: 64
Discharge: HOME | End: 2024-09-09
Attending: FAMILY MEDICINE | Admitting: FAMILY MEDICINE
Payer: COMMERCIAL

## 2024-09-09 DIAGNOSIS — N95.0 POSTMENOPAUSAL BLEEDING: ICD-10-CM

## 2024-09-09 PROCEDURE — 76856 US EXAM PELVIC COMPLETE: CPT

## 2024-09-09 PROCEDURE — 76830 TRANSVAGINAL US NON-OB: CPT

## 2024-09-10 ENCOUNTER — TELEPHONE (OUTPATIENT)
Dept: FAMILY MEDICINE | Facility: CLINIC | Age: 64
End: 2024-09-10
Payer: COMMERCIAL

## 2024-09-10 NOTE — TELEPHONE ENCOUNTER
----- Message from Huong Greenwood sent at 9/10/2024 12:27 PM CDT -----  Please notify Mary Carmen that her  pelvic ultrasound is still normal.  It has not changed at all since last time and no additional follow-up needed at this time unless she has recurrent bleeding.  Huong Greenwood MD

## 2024-09-10 NOTE — RESULT ENCOUNTER NOTE
Please notify Mary Carmen that her  pelvic ultrasound is still normal.  It has not changed at all since last time and no additional follow-up needed at this time unless she has recurrent bleeding.  Huong Greenwood MD

## 2024-09-10 NOTE — LETTER
September 11, 2024      Mary Carmen Jaime  [id number]  [place] [address]        Dear ,    We are writing to inform you of your test results.    Your pelvic ultrasound is still normal. It has not changed at all since last time and no additional follow-up needed at this time unless she has recurrent bleeding.     Resulted Orders   US Pelvic Complete with Transvaginal    Narrative    US PELVIC TRANSABDOMINAL AND  TRANSVAGINAL IMAGING  9/9/2024 9:35 AM    CLINICAL HISTORY: Follow up postmenopausal bleeding.    TECHNIQUE: Transabdominal scans were performed. Endovaginal ultrasound  was performed to better visualize the adnexa.    COMPARISON: Ultrasound 6/3/2024.    FINDINGS:  UTERUS: 6.0 x 4.2 x 2.9 cm. No focal lesion.    ENDOMETRIUM: 3 mm. Normal smooth endometrium.    RIGHT OVARY: 2.1 x 1.6 x 1.8 cm. No focal lesion.    LEFT OVARY: 2.6 x 1.9 x 1.4 cm. No focal lesion.    No significant free fluid.      Impression    IMPRESSION:  No specific abnormality identified. Endometrium is thin  measuring 3 mm.    ZOEY RUEDA MD         SYSTEM ID:  [id number]       If you have any questions or concerns, please call the clinic at the number listed above.       Sincerely,        Aleida Greenwood MD/ Care Team

## 2025-05-28 ENCOUNTER — HOSPITAL ENCOUNTER (OUTPATIENT)
Dept: MAMMOGRAPHY | Facility: CLINIC | Age: 65
Discharge: HOME | End: 2025-05-28
Payer: COMMERCIAL

## 2025-05-28 DIAGNOSIS — Z12.31 VISIT FOR SCREENING MAMMOGRAM: ICD-10-CM

## 2025-05-28 PROCEDURE — 77063 BREAST TOMOSYNTHESIS BI: CPT

## 2025-05-29 ENCOUNTER — OFFICE VISIT (OUTPATIENT)
Dept: FAMILY MEDICINE | Facility: OTHER | Age: 65
End: 2025-05-29
Payer: COMMERCIAL

## 2025-05-29 VITALS
OXYGEN SATURATION: 98 % | DIASTOLIC BLOOD PRESSURE: 70 MMHG | BODY MASS INDEX: 29.83 KG/M2 | TEMPERATURE: 98 F | HEART RATE: 62 BPM | WEIGHT: 158 LBS | HEIGHT: 61 IN | RESPIRATION RATE: 20 BRPM | SYSTOLIC BLOOD PRESSURE: 112 MMHG

## 2025-05-29 DIAGNOSIS — Z85.828 HISTORY OF BASAL CELL CARCINOMA: ICD-10-CM

## 2025-05-29 DIAGNOSIS — D48.5 NEOPLASM OF UNCERTAIN BEHAVIOR OF SKIN: Primary | ICD-10-CM

## 2025-05-29 RX ORDER — LIDOCAINE HYDROCHLORIDE AND EPINEPHRINE 10; 10 MG/ML; UG/ML
5 INJECTION, SOLUTION INFILTRATION; PERINEURAL ONCE
Status: COMPLETED | OUTPATIENT
Start: 2025-05-29 | End: 2025-05-29

## 2025-05-29 RX ADMIN — LIDOCAINE HYDROCHLORIDE AND EPINEPHRINE 5 ML: 10; 10 INJECTION, SOLUTION INFILTRATION; PERINEURAL at 09:00

## 2025-05-29 ASSESSMENT — PAIN SCALES - GENERAL: PAINLEVEL_OUTOF10: NO PAIN (0)

## 2025-05-29 NOTE — PATIENT INSTRUCTIONS
Today we completed a shave biopsy to remove the suspicious skin growth on your left upper chest.  Sample will be sent to pathology and will take roughly 5 to 7 days to result.  I will call with results once available.  As we completed the clinic procedure and removed the top layer of your skin on your left chest wall, please watch for signs and symptoms of infection.  Signs and symptoms of infection include increased warmth, tenderness, and yellow discharge from site.  Over the next 3 days I would recommend keeping area covered with a bandage and thin layer of antibiotic ointment (Neosporin or bacitracin).  Once the scab is formed okay to leave open to air during the daytime and simply apply Vaseline to keep skin moisturized.  Please be diligent with sunscreen application this summer as the new skin cells that develop over shave biopsy site will be more sensitive and burn more readily compared to surrounding skin.

## 2025-05-29 NOTE — PROGRESS NOTES
Assessment & Plan     Neoplasm of uncertain behavior of skin  Patient is a 64 year-old female presenting with concerns of skin lesion to left upper anterior chest wall/neck that is overlying left clavicle. Lesion is 4 mm x 4 mm and is an pearly papule with dimpled center and surrounding erythema. Due to history of basal cell carcinoma, completed shave biopsy during encounter, see procedure note below. Specimen sent for pathology.   - OR TANGENTIAL BIOPSY OF SKIN, FIRST/SINGLE LESION  - Dermatological path order and indications  - lidocaine 1% with EPINEPHrine 1:100,000 injection 5 mL    History of basal cell carcinoma  Diagnosed in 2018 by dermatology based on tissue biopsy. Underwent subsequent Mohs procedure.       Procedure Note:    Pre-Procedure Diagnosis: neoplasm of uncertain behavior of skin     Procedure Performed: shave biopsy    Consent: verbal consent was obtained following risk-benefit discussion     Performing Provider: DOT Wilson CNP      Procedure:   The area was cleaned using iodine. The site was anesthetized with 1% lidocaine with epinephrine. Papule removed using dermablade. Bleeding was minimal and controlled with pressure.  A pressure dressing with bacitracin was applied.    Followup: The patient tolerated the procedure well without complications. Standard post-procedure care is explained and return precautions are given.      Terri Lentz is a 64 year old, presenting for the following health issues:  Mole      5/29/2025     8:47 AM   Additional Questions   Roomed by Alecia   Accompanied by Self         5/29/2025     8:47 AM   Patient Reported Additional Medications   Patient reports taking the following new medications NA     History of Present Illness       Reason for visit:  Suspicious mole  Symptom onset:  1-2 weeks ago  Symptoms include:  Suspious mole  Symptom intensity:  Moderate  Symptom progression:  Staying the same  Had these symptoms before:  No  What makes it worse:   "No  What makes it better:  Putting neosporin on mole   She is taking medications regularly.        Presents with skin concern that was first noticed prior to Memorial Day weekend. Lesion on anterior upper left chest wall/neck over left clavicle. Associated mild pruritic features. Applied OTC antibiotic ointment with some relief of pruritic features. Unsure if she sustained an insect bite prior to lesion presence. Unsure if there was a mole in area prior to new growth concern. Reports personal history of basal cell carcinoma on neck in 2018 confirmed with biopsy. Underwent subsequent Mohs.         Objective    /70   Pulse 62   Temp 98  F (36.7  C) (Temporal)   Resp 20   Ht 1.548 m (5' 0.95\")   Wt 71.7 kg (158 lb)   LMP 11/01/2013   SpO2 98%   BMI 29.91 kg/m    Body mass index is 29.91 kg/m .  Physical Exam  Vitals reviewed.   Constitutional:       General: She is not in acute distress.     Appearance: Normal appearance. She is not ill-appearing.   Pulmonary:      Effort: Pulmonary effort is normal. No respiratory distress.      Breath sounds: Normal breath sounds.   Lymphadenopathy:      Cervical: No cervical adenopathy.      Upper Body:      Right upper body: No supraclavicular adenopathy.      Left upper body: No supraclavicular adenopathy.   Skin:     General: Skin is warm and dry.      Capillary Refill: Capillary refill takes less than 2 seconds.      Findings: Lesion (4 mm x 4 mm firm pearly papule with symmetrical boarders, surrouding erythema, and dimpled center on left anterior chest wall/neck overlying left clavicle.) present.   Neurological:      Mental Status: She is alert.        Media Information    Document Information    Other: Photograph   Skin lesion   05/29/2025 9:02 AM   Attached To:   Office Visit on 5/29/25 with Diana Arguelles APRN CNP   Source Information    Diana Arguelles APRN CNP  [facility]   Document History      Labs pending.         Signed Electronically by: Diana " DOT Arguelles CNP

## 2025-06-02 ENCOUNTER — RESULTS FOLLOW-UP (OUTPATIENT)
Dept: FAMILY MEDICINE | Facility: CLINIC | Age: 65
End: 2025-06-02

## 2025-06-02 DIAGNOSIS — C44.529 SQUAMOUS CELL CARCINOMA OF SKIN OF CHEST: Primary | ICD-10-CM

## 2025-06-02 LAB
PATH REPORT.COMMENTS IMP SPEC: ABNORMAL
PATH REPORT.COMMENTS IMP SPEC: ABNORMAL
PATH REPORT.COMMENTS IMP SPEC: YES
PATH REPORT.FINAL DX SPEC: ABNORMAL
PATH REPORT.GROSS SPEC: ABNORMAL
PATH REPORT.MICROSCOPIC SPEC OTHER STN: ABNORMAL
PATH REPORT.RELEVANT HX SPEC: ABNORMAL

## 2025-06-03 ENCOUNTER — TELEPHONE (OUTPATIENT)
Dept: DERMATOLOGY | Facility: CLINIC | Age: 65
End: 2025-06-03
Payer: COMMERCIAL

## 2025-06-03 NOTE — TELEPHONE ENCOUNTER
Called pt from referral work que, no answer, LVM for her to return our call at [phone].        Pt needs excision & pre visit checklist for   Left anterior chest wall:  - Squamous cell carcinoma      Please attach referral to excision appointment so it falls back into the work que if pt cancels.    Fallon Bustos RN on 6/3/2025 at 2:48 PM

## 2025-06-04 NOTE — TELEPHONE ENCOUNTER
Pt called, she wanted to push procedure out til after her birthday to qualify for medicare. Writer advised that we would want to treat this in 6-8 weeks. Writer will send staff message to referring provider to inform them of delaying treatment.    Fallon Bustos RN on 6/4/2025 at 3:22 PM          Excision/Mohs previsit information                                                    Diagnosis: squamous cell carcinoma  Site(s): Left anterior chest wall    Is the surgical site below the waist?  NO  If yes, instruct the patient to purchase over the counter chlorhexidine surgical soap and wash all skin below the belly button twice before surgery    Allergies   Allergen Reactions    Cephalosporins Rash    Amoxicillin Rash    Penicillins Rash       Review and update allergy and medication list    Do you take the following medications:  Coumadin, Eliquis, Pradaxa, Xarelto:  NO.  If on Coumadin, INR should be checked within 7 days of surgery.  Range should be 3.5 or less or within therapeutic range.    Do you currently or have you previously had any of the following conditions:  Hepatitis:  NO  HIV/AIDS:  NO  Prolonged bleeding or bleeding disorder:  NO  Pacemaker: NO  Defibrillator:  NO  History of artificial or heart valve replacement:  NO  Endocarditis (inflammation of the inner lining of the heart's chambers and valves):  NO  Have you ever had a prosthetic joint infection:  NO  Pregnant or Breastfeeding:  NO  Mobility device (wheelchair, transfer difficulty): NO    Important Reminders:                                                      -For Mohs, notify patient they may be here through the lunch hour.  Be prepared to have down time and we recommend they bring a book or something to do.  -Ok to take all of their medications as prescribed  -Notify patient to eat prior to appointment.  The medication is more likely to cause you to feel jittery if you have an empty stomach.  -If face is being treated, please come  with a make-up free face  -Avoid wearing earrings and necklaces  -If scalp is being treated, please come with clean hair free from product  -Patient will not be able to get the site wet for 48 hrs  -No submerging wound in standing water (lake, pool, bathtub, hot tub) for 2 weeks  -No physical activity for 48 hrs (further restrictions will be discussed by MD at time of visit)    If any positives, send to RN for further review  Fallon Bustos RN

## 2025-06-04 NOTE — TELEPHONE ENCOUNTER
Message below sent to referring provider: DOT Wilson,  We received a referral in Dermatology for this pt to treat her SCC on her chest. We recommend treatment in 6-8 weeks, but she declined and wanted to wait til after September to qualify for medicare. Just wanted to inform you of the delay in treatment. I did explain the risks of postponing treatment but wanted to also let you know.      Thanks,  VALERIO Rao RN on 6/4/2025 at 3:27 PM

## (undated) DEVICE — KIT ENDO TURNOVER/PROCEDURE CARRY-ON 101822

## (undated) DEVICE — TUBING SUCTION 6"X3/16" N56A

## (undated) DEVICE — SOL WATER IRRIG 1000ML BOTTLE 2F7114

## (undated) RX ORDER — PROPOFOL 10 MG/ML
INJECTION, EMULSION INTRAVENOUS
Status: DISPENSED
Start: 2020-02-18

## (undated) RX ORDER — LIDOCAINE HYDROCHLORIDE 20 MG/ML
INJECTION, SOLUTION EPIDURAL; INFILTRATION; INTRACAUDAL; PERINEURAL
Status: DISPENSED
Start: 2020-02-18